# Patient Record
Sex: FEMALE | Race: WHITE | Employment: OTHER | URBAN - METROPOLITAN AREA
[De-identification: names, ages, dates, MRNs, and addresses within clinical notes are randomized per-mention and may not be internally consistent; named-entity substitution may affect disease eponyms.]

---

## 2017-01-09 ENCOUNTER — OFFICE VISIT (OUTPATIENT)
Dept: INTERNAL MEDICINE CLINIC | Age: 67
End: 2017-01-09

## 2017-01-09 VITALS
BODY MASS INDEX: 26.99 KG/M2 | RESPIRATION RATE: 24 BRPM | WEIGHT: 162 LBS | SYSTOLIC BLOOD PRESSURE: 138 MMHG | OXYGEN SATURATION: 98 % | HEIGHT: 65 IN | TEMPERATURE: 98 F | HEART RATE: 84 BPM | DIASTOLIC BLOOD PRESSURE: 80 MMHG

## 2017-01-09 DIAGNOSIS — R45.89 FIDGETING: ICD-10-CM

## 2017-01-09 DIAGNOSIS — R26.9 GAIT DIFFICULTY: ICD-10-CM

## 2017-01-09 DIAGNOSIS — I25.10 CORONARY ARTERY DISEASE INVOLVING NATIVE CORONARY ARTERY OF NATIVE HEART WITHOUT ANGINA PECTORIS: ICD-10-CM

## 2017-01-09 DIAGNOSIS — G47.9 SLEEP DISORDER, UNSPECIFIED: ICD-10-CM

## 2017-01-09 DIAGNOSIS — I10 ESSENTIAL HYPERTENSION: Chronic | ICD-10-CM

## 2017-01-09 DIAGNOSIS — M79.604 RIGHT LEG PAIN: Primary | Chronic | ICD-10-CM

## 2017-01-09 RX ORDER — DIAZEPAM 5 MG/1
TABLET ORAL
Qty: 60 TAB | Refills: 2 | Status: SHIPPED | OUTPATIENT
Start: 2017-01-09 | End: 2017-05-02 | Stop reason: SDUPTHER

## 2017-01-09 RX ORDER — AMLODIPINE BESYLATE 10 MG/1
10 TABLET ORAL DAILY
Qty: 90 TAB | Refills: 3 | Status: SHIPPED | OUTPATIENT
Start: 2017-01-09 | End: 2017-05-02 | Stop reason: SDUPTHER

## 2017-01-09 RX ORDER — HYDROCODONE BITARTRATE AND ACETAMINOPHEN 7.5; 325 MG/1; MG/1
1 TABLET ORAL
Qty: 90 TAB | Refills: 0 | Status: SHIPPED | OUTPATIENT
Start: 2017-01-09 | End: 2017-02-03 | Stop reason: SDUPTHER

## 2017-01-09 NOTE — PROGRESS NOTES
HISTORY OF PRESENT ILLNESS  Momo Mcknight is a 73 y.o. female. Pt. comes in with her  for f/u. Has multiple medical problems. has chronic R lower back/leg/buttock/groin/hip. Extensive w/u by different specialists has not shown any specific etiology. PT and home exercises id helping. Gait is off but improved with no recent falls. On norco and valium prn. Reports sleep issues at night. Feels fidgety. Has been on Cymbalta for a few months and wonders about side effects. Reports drinking coffee and alcohol in evening. Keep TV and light on at night. Also drinks coffee around 2 AM after waking up. Thinks it helps her sleep. Now on disability. Reports compliance with medications and diet. Med list and most recent labs/studies reviewed with pt. Has not been active physically. Has an appointment for neuropsychiatric evaluation coming up. Needs med refills. No other new c/o. Leg Pain    Pertinent negatives include no back pain. Hypertension    Associated symptoms include malaise/fatigue. Pertinent negatives include no chest pain, no blurred vision, no headaches, no dizziness and no shortness of breath. Sleep Problem   Pertinent negatives include no chest pain, no abdominal pain, no headaches and no shortness of breath. Review of Systems   Constitutional: Positive for malaise/fatigue. Negative for weight loss. Eyes: Negative for blurred vision. Respiratory: Negative for shortness of breath. Cardiovascular: Negative for chest pain and leg swelling. Gastrointestinal: Negative for abdominal pain. Genitourinary: Negative for dysuria. Musculoskeletal: Positive for joint pain (R lateral buttock/hip/leg). Negative for back pain and falls. Skin: Negative. Neurological: Positive for focal weakness (R leg). Negative for dizziness, sensory change and headaches. Psychiatric/Behavioral: Positive for depression. Negative for memory loss. The patient is nervous/anxious.  The patient does not have insomnia. All other systems reviewed and are negative. Physical Exam   Constitutional: She is oriented to person, place, and time. She appears well-developed and well-nourished. No distress (with pain). HENT:   Head: Normocephalic and atraumatic. Mouth/Throat: Oropharynx is clear and moist.   Eyes: Conjunctivae are normal. No scleral icterus. Neck: Normal range of motion. Neck supple. No JVD present. No thyromegaly present. Cardiovascular: Normal rate, regular rhythm and normal heart sounds. Pulmonary/Chest: Effort normal and breath sounds normal. No respiratory distress. She has no wheezes. She has no rales. Abdominal: Soft. Bowel sounds are normal. She exhibits no distension. Musculoskeletal: She exhibits tenderness (R upper ateral buttock/hip). She exhibits no edema. djd  Weak legs with muscle atrophy   Neurological: She is alert and oriented to person, place, and time. Coordination abnormal.   Skin: Skin is warm and dry. No rash noted. Psychiatric: Her behavior is normal.   Seems anxious and depressed   Nursing note and vitals reviewed. ASSESSMENT and PLAN  Magdaleno Barkley was seen today for leg pain, hypertension and sleep problem. Diagnoses and all orders for this visit:    Right leg pain, chronic    Sleep disorder, unspecified    Fidgeting    Essential hypertension    Gait difficulty    Coronary artery disease involving native coronary artery of native heart without angina pectoris    Other orders  -     diazePAM (VALIUM) 5 mg tablet; TAKE ONE TABLET BY MOUTH EVERY 12 HOURS AS NEEDED FOR ANXIETY  -     HYDROcodone-acetaminophen (NORCO) 7.5-325 mg per tablet; Take 1 Tab by mouth every eight (8) hours as needed for Pain. Max Daily Amount: 3 Tabs. -     amLODIPine (NORVASC) 10 mg tablet; Take 1 Tab by mouth daily. Follow-up Disposition:  Return in about 3 months (around 4/9/2017).   lab results and schedule of future lab studies reviewed with patient  reviewed diet, exercise and weight control  cardiovascular risk and specific lipid/LDL goals reviewed  reviewed medications and side effects in detail  use of aspirin to prevent MI and TIA's discussed  Pt declined flu shot  F/u with other MD's as scheduled  Discussed sleep hygiene in detail. Written info given.

## 2017-01-09 NOTE — MR AVS SNAPSHOT
Visit Information Date & Time Provider Department Dept. Phone Encounter #  
 1/9/2017  8:45 AM Yuniel Gilln [facility] Internal Medicine [phone] [id number] Follow-up Instructions Return in about 3 months (around 4/9/2017). Your Appointments 1/11/2017 10:40 AM  
New Patient with Prosper Mike, Gabriela Neurology [facility] [place] [address]) Appt Note: new patient consult/ Wayne Johansen [address] [place] [address] [facility] [id number] [phone]  
  
   
 [address] [place] [id number] [address] Upcoming Health Maintenance Date Due Hepatitis C Screening 1950 DTaP/Tdap/Td series (1 - Tdap) 2/24/1972 FOBT Q 1 YEAR AGE 50-75 2/24/2001 ZOSTER VACCINE AGE 60> 2/24/2011 GLAUCOMA SCREENING Q2Y 2/24/2016 Pneumococcal 65+ Low/Medium Risk (1 of 2 - PCV13) 2/24/2016 INFLUENZA AGE 9 TO ADULT 8/1/2016 MEDICARE YEARLY EXAM 4/9/2017 BREAST CANCER SCRN MAMMOGRAM 4/8/2018 Allergies as of 1/9/2017  Review Complete On: 1/9/2017 By: Jose Roldan,  Severity Noted Reaction Type Reactions Pcn [Penicillins]  12/16/2009    Swelling Current Immunizations  Reviewed on 2/4/2015 No immunizations on file. Not reviewed this visit You Were Diagnosed With   
  
 Codes Comments Right leg pain    -  Primary ICD-10-CM: M79.604 ICD-9-CM: 729.5 Sleep disorder, unspecified     ICD-10-CM: G47.9 ICD-9-CM: 780.50 Essential hypertension     ICD-10-CM: I10 
ICD-9-CM: 401.9 Gait difficulty     ICD-10-CM: R26.9 ICD-9-CM: 369. 2 Coronary artery disease involving native coronary artery of native heart without angina pectoris     ICD-10-CM: I25.10 ICD-9-CM: 414.01 Vitals BP Pulse Temp Resp Height(growth percentile) Weight(growth percentile) 138/80 84 98 °F (36.7 °C) 24 5' 5\" (1.651 m) 162 lb (73.5 kg) SpO2 BMI OB Status Smoking Status 98% 26.96 kg/m2 Postmenopausal Former Smoker BMI and BSA Data Body Mass Index Body Surface Area  
 26.96 kg/m 2 1.84 m 2 Preferred Pharmacy Pharmacy Name Phone [facility] [address] [address] Your Updated Medication List  
  
   
This list is accurate as of: 1/9/17  9:46 AM.  Always use your most recent med list. amLODIPine 10 mg tablet Commonly known as:  Irvinelo Myersbury Take 1 Tab by mouth daily. aspirin 325 mg tablet Commonly known as:  ASPIRIN Take 325 mg by mouth daily. diazePAM 5 mg tablet Commonly known as:  VALIUM  
TAKE ONE TABLET BY MOUTH EVERY 12 HOURS AS NEEDED FOR ANXIETY DULoxetine 30 mg capsule Commonly known as:  CYMBALTA Take 1 Cap by mouth daily. ergocalciferol 50,000 unit capsule Commonly known as:  ERGOCALCIFEROL Take 1 Cap by mouth every seven (7) days. hydroCHLOROthiazide 25 mg tablet Commonly known as:  HYDRODIURIL Take 1 Tab by mouth daily. HYDROcodone-acetaminophen 7.5-325 mg per tablet Commonly known as:  Claire Fothergill Take 1 Tab by mouth every eight (8) hours as needed for Pain. Max Daily Amount: 3 Tabs. potassium 99 mg tablet Take 99 mg by mouth daily. VITAMIN D3 1,000 unit tablet Generic drug:  cholecalciferol Take 2,000 Units by mouth daily. Prescriptions Printed Refills  
 diazePAM (VALIUM) 5 mg tablet 2 Sig: TAKE ONE TABLET BY MOUTH EVERY 12 HOURS AS NEEDED FOR ANXIETY Class: Print HYDROcodone-acetaminophen (NORCO) 7.5-325 mg per tablet 0 Sig: Take 1 Tab by mouth every eight (8) hours as needed for Pain. Max Daily Amount: 3 Tabs. Class: Print Route: Oral  
  
Prescriptions Sent to Pharmacy Refills  
 amLODIPine (NORVASC) 10 mg tablet 3 Sig: Take 1 Tab by mouth daily. Class: Normal  
 Pharmacy: [facility] [address], Steven Leahy  #: [phone]  Route: Oral  
 Follow-up Instructions Return in about 3 months (around 4/9/2017). Patient Instructions Learning About Sleeping Well What does sleeping well mean? Sleeping well means getting enough sleep. How much sleep is enough varies among people. The number of hours you sleep is not as important as how you feel when you wake up. If you do not feel refreshed, you probably need more sleep. Another sign of not getting enough sleep is feeling tired during the day. The average total nightly sleep time is 7½ to 8 hours. Healthy adults may need a little more or a little less than this. Why is getting enough sleep important? Getting enough quality sleep is a basic part of good health. When your sleep suffers, your mood and your thoughts can suffer too. You may find yourself feeling more grumpy or stressed. Not getting enough sleep also can lead to serious problems, including injury, accidents, anxiety, and depression. What might cause poor sleeping? Many things can cause sleep problems, including: · Stress. Stress can be caused by fear about a single event, such as giving a speech. Or you may have ongoing stress, such as worry about work or school. · Depression, anxiety, and other mental or emotional conditions. · Changes in your sleep habits or surroundings. This includes changes that happen where you sleep, such as noise, light, or sleeping in a different bed. It also includes changes in your sleep pattern, such as having jet lag or working a late shift. · Health problems, such as pain, breathing problems, and restless legs syndrome. · Lack of regular exercise. How can you help yourself? Here are some tips that may help you sleep more soundly and wake up feeling more refreshed. Your sleeping area · Use your bedroom only for sleeping and sex. A bit of light reading may help you fall asleep. But if it doesn't, do your reading elsewhere in the house. Don't watch TV in bed. · Be sure your bed is big enough to stretch out comfortably, especially if you have a sleep partner. · Keep your bedroom quiet, dark, and cool. Use curtains, blinds, or a sleep mask to block out light. To block out noise, use earplugs, soothing music, or a \"white noise\" machine. Your evening and bedtime routine · Create a relaxing bedtime routine. You might want to take a warm shower or bath, listen to soothing music, or drink a cup of noncaffeinated tea. · Go to bed at the same time every night. And get up at the same time every morning, even if you feel tired. What to avoid · Limit caffeine (coffee, tea, caffeinated sodas) during the day, and don't have any for at least 4 to 6 hours before bedtime. · Don't drink alcohol before bedtime. Alcohol can cause you to wake up more often during the night. · Don't smoke or use tobacco, especially in the evening. Nicotine can keep you awake. · Don't take naps during the day, especially close to bedtime. · Don't lie in bed awake for too long. If you can't fall asleep, or if you wake up in the middle of the night and can't get back to sleep within 15 minutes or so, get out of bed and go to another room until you feel sleepy. · Don't take medicine right before bed that may keep you awake or make you feel hyper or energized. Your doctor can tell you if your medicine may do this and if you can take it earlier in the day. If you can't sleep · Imagine yourself in a peaceful, pleasant scene. Focus on the details and feelings of being in a place that is relaxing. · Get up and do a quiet or boring activity until you feel sleepy. · Don't drink any liquids after 6 p.m. if you wake up often because you have to go to the bathroom. Where can you learn more? Go to http://guanaco-alexander.info/. Enter J900 in the search box to learn more about \"Learning About Sleeping Well. \" Current as of: July 26, 2016 Content Version: 11.1 © 1451-9397 Healthwise, Incorporated. Care instructions adapted under license by [employer] (which disclaims liability or warranty for this information). If you have questions about a medical condition or this instruction, always ask your healthcare professional. [address] any warranty or liability for your use of this information. Introducing [facility]! Dear Chiara Reyna: 
Thank you for requesting a tastytrade account. Our records indicate that you already have an active tastytrade account. You can access your account anytime at [email or web address] Did you know that you can access your hospital and ER discharge instructions at any time in tastytrade? You can also review all of your test results from your hospital stay or ER visit. Additional Information If you have questions, please visit the Frequently Asked Questions section of the tastytrade website at [email or web address] Remember, tastytrade is NOT to be used for urgent needs. For medical emergencies, dial 911. Now available from your iPhone and Android! Please provide this summary of care documentation to your next provider. Your primary care clinician is listed as Alex Pitt. If you have any questions after today's visit, please call [phone].

## 2017-01-09 NOTE — PROGRESS NOTES
Chief Complaint   Patient presents with    Leg Pain     RLE pain level 8/10 and LLE 6/10    Hypertension     needs refill #90 day supply       1. Have you been to the ER, urgent care clinic since your last visit? Hospitalized since your last visit? [facility]     2. Have you seen or consulted any other health care providers outside of the [address] [place] [address] since your last visit? Include any pap smears or colon screening.   NO      Used 2 patient I. D. 's

## 2017-01-09 NOTE — PATIENT INSTRUCTIONS

## 2017-01-11 ENCOUNTER — OFFICE VISIT (OUTPATIENT)
Dept: NEUROLOGY | Age: 67
End: 2017-01-11

## 2017-01-11 DIAGNOSIS — R25.1 TREMOR: ICD-10-CM

## 2017-01-11 DIAGNOSIS — F43.23 ADJUSTMENT DISORDER WITH MIXED ANXIETY AND DEPRESSED MOOD: ICD-10-CM

## 2017-01-11 DIAGNOSIS — R41.3 MEMORY LOSS: Primary | ICD-10-CM

## 2017-01-11 DIAGNOSIS — M79.604 RIGHT LEG PAIN: ICD-10-CM

## 2017-01-11 DIAGNOSIS — R53.1 WEAKNESS GENERALIZED: ICD-10-CM

## 2017-01-11 NOTE — PROGRESS NOTES
[address] [facility]  [address] [facility]   [facility] [address] [facility] [address] [facility]   Krunal Smith   [phone] Office   [phone] Fax      Neuropsychology    Initial Diagnostic Interview Note      Referral:  DO Anisa Perez is a 73 y.o. right handed  female who was accompanied by her spouse to the initial clinical interview on 1/11/17 . Please refer to her medical records for details pertaining to her history. Briefly, the patient reported that she completed the 12th grade without history of previously diagnosed LD and/or receipt of special education services. She was working in a [employer] and is out on medical leave. The patient had an ED visit and neurospych testing was recommended as there are questions of psychogenic versus organic based neurocognitive and/or physical functioning. She has episodic weakness and giving out of the leg and she falls. Yesterday, she fell six times and she has hit her head many times. She fell into furniture yesterday and hit her head. She falls heavily, not a light fall. Right leg hurt. Tremors also. Onset of about now for about a year and no known reason for same. Has participated in therapies to limited avail. Some diminished speed of processing and short term memory issues also. Can't communicate properly to limbs. Loses words sometimes, but not major. No known neurologic correlate. She has no reported prior history of major mood problems, but has been in the house for over a year and is more depressed now and anxious about what is going on. No counseling or psychiatrist currently. See imaging and labs in chart. MMSE: 26/30 with recall 1/3 and spelling 3/5. Clock: Normal, but slow to complete, and tremor    TOPF: 46    No previous neuropsych testing,.      Historian: Good  Praxis: No UE apraxia  R/L Orientation: Intact to self and to other  Dress: within normal limits   Weight: within normal limits   Appearance/Hygiene: within normal limits   Gait: slow, with rollator   Assistive Devices: rollator, glasses  Mood: mildly depressed  Affect: tearful at times   Comprehension: within normal limits   Thought Process: logical and goal oriented but slow to process  Expressive Language: within normal limits   Receptive Language: within normal limits   Motor:  No cognitive or motor perseveration  ETOH: 1 -2 beers a day  Tobacco: Quit 10 years ago  Illicit: Denied  SI/HI: Denied  Psychosis: Denied   Insight: Within normal limits  Judgment: Within normal limits  Other Psych:      Past Medical History   Diagnosis Date    CAD (coronary artery disease)     Hypertension     Hypokalaemia        Past Surgical History   Procedure Laterality Date    Hx appendectomy      Hx oophorectomy Right     Hx tonsillectomy         Allergies   Allergen Reactions    Pcn [Penicillins] Swelling       Family History   Problem Relation Age of Onset    Diabetes Mother        Social History   Substance Use Topics    Smoking status: Former Smoker     Packs/day: 0.50     Quit date: 10/19/2011    Smokeless tobacco: Never Used    Alcohol use 0.6 - 1.2 oz/week     1 - 2 Standard drinks or equivalent per week      Comment: one drink per week       Current Outpatient Prescriptions   Medication Sig Dispense Refill    diazePAM (VALIUM) 5 mg tablet TAKE ONE TABLET BY MOUTH EVERY 12 HOURS AS NEEDED FOR ANXIETY 60 Tab 2    HYDROcodone-acetaminophen (NORCO) 7.5-325 mg per tablet Take 1 Tab by mouth every eight (8) hours as needed for Pain. Max Daily Amount: 3 Tabs. 90 Tab 0    amLODIPine (NORVASC) 10 mg tablet Take 1 Tab by mouth daily. 90 Tab 3    DULoxetine (CYMBALTA) 30 mg capsule Take 1 Cap by mouth daily. 30 Cap 5    hydrochlorothiazide (HYDRODIURIL) 25 mg tablet Take 1 Tab by mouth daily. 90 Tab 3    ergocalciferol (ERGOCALCIFEROL) 50,000 unit capsule Take 1 Cap by mouth every seven (7) days.  (Patient taking differently: Take 50,000 Units by mouth every Monday.) 12 Cap 0    cholecalciferol (VITAMIN D3) 1,000 unit tablet Take 2,000 Units by mouth daily.  potassium 99 mg tablet Take 99 mg by mouth daily.  aspirin (ASPIRIN) 325 mg tablet Take 325 mg by mouth daily. Plan:  Obtain authorization for testing from insurance company. Report to follow once testing, scoring, and interpretation completed. ? Organic based neurocognitive issues versus mood disorder or combination of same. ? Problems organic, functional, or both? This note will not be viewable in [address]

## 2017-01-12 ENCOUNTER — OFFICE VISIT (OUTPATIENT)
Dept: NEUROLOGY | Age: 67
End: 2017-01-12

## 2017-01-12 DIAGNOSIS — F33.1 DEPRESSION, MAJOR, RECURRENT, MODERATE (HCC): ICD-10-CM

## 2017-01-12 DIAGNOSIS — F44.9 CONVERSION DISORDER: ICD-10-CM

## 2017-01-12 DIAGNOSIS — F41.1 GENERALIZED ANXIETY DISORDER: ICD-10-CM

## 2017-01-12 DIAGNOSIS — R41.3 MEMORY LOSS: Primary | ICD-10-CM

## 2017-01-19 NOTE — PROGRESS NOTES
[address] [facility],5Th [address]  [address] [facility] [address] Northwest HospitalLynne huizar Al Michele Espinal   [phone] Office   [phone] Fax      Neuropsychological Evaluation Report      Referral:  Sisto Blizzard, DO Tyna Annas is a 73 y.o. right handed  female who was accompanied by her spouse to the initial clinical interview on 1/11/17 . Please refer to her medical records for details pertaining to her history. Briefly, the patient reported that she completed the 12th grade without history of previously diagnosed LD and/or receipt of special education services. She was working in a [employer] and is out on medical leave. The patient had an ED visit and neurospych testing was recommended as there are questions of psychogenic versus organic based neurocognitive and/or physical functioning. She has episodic weakness and giving out of the leg and she falls. Yesterday, she fell six times and she has hit her head many times. She fell into furniture yesterday and hit her head. She falls heavily, not a light fall. Right leg hurt. Tremors also. Onset of about now for about a year and no known reason for same. Has participated in therapies to limited avail. Some diminished speed of processing and short term memory issues also. Can't communicate properly to limbs. Loses words sometimes, but not major. No known neurologic correlate. She has no reported prior history of major mood problems, but has been in the house for over a year and is more depressed now and anxious about what is going on. No counseling or psychiatrist currently. See imaging and labs in chart. MMSE: 26/30 with recall 1/3 and spelling 3/5. Clock: Normal, but slow to complete, and tremor    TOPF: 46    No previous neuropsych testing,.      Historian: Good  Praxis: No UE apraxia  R/L Orientation: Intact to self and to other  Dress: within normal limits   Weight: within normal limits Appearance/Hygiene: within normal limits   Gait: slow, with rollator   Assistive Devices: rollator, glasses  Mood: mildly depressed  Affect: tearful at times   Comprehension: within normal limits   Thought Process: logical and goal oriented but slow to process  Expressive Language: within normal limits   Receptive Language: within normal limits   Motor:  No cognitive or motor perseveration  ETOH: 1 -2 beers a day  Tobacco: Quit 10 years ago  Illicit: Denied  SI/HI: Denied  Psychosis: Denied   Insight: Within normal limits  Judgment: Within normal limits  Other Psych:      Past Medical History   Diagnosis Date    CAD (coronary artery disease)     Hypertension     Hypokalaemia        Past Surgical History   Procedure Laterality Date    Hx appendectomy      Hx oophorectomy Right     Hx tonsillectomy         Allergies   Allergen Reactions    Pcn [Penicillins] Swelling       Family History   Problem Relation Age of Onset    Diabetes Mother        Social History   Substance Use Topics    Smoking status: Former Smoker     Packs/day: 0.50     Quit date: 10/19/2011    Smokeless tobacco: Never Used    Alcohol use 0.6 - 1.2 oz/week     1 - 2 Standard drinks or equivalent per week      Comment: one drink per week       Current Outpatient Prescriptions   Medication Sig Dispense Refill    diazePAM (VALIUM) 5 mg tablet TAKE ONE TABLET BY MOUTH EVERY 12 HOURS AS NEEDED FOR ANXIETY 60 Tab 2    HYDROcodone-acetaminophen (NORCO) 7.5-325 mg per tablet Take 1 Tab by mouth every eight (8) hours as needed for Pain. Max Daily Amount: 3 Tabs. 90 Tab 0    amLODIPine (NORVASC) 10 mg tablet Take 1 Tab by mouth daily. 90 Tab 3    DULoxetine (CYMBALTA) 30 mg capsule Take 1 Cap by mouth daily. 30 Cap 5    hydrochlorothiazide (HYDRODIURIL) 25 mg tablet Take 1 Tab by mouth daily. 90 Tab 3    ergocalciferol (ERGOCALCIFEROL) 50,000 unit capsule Take 1 Cap by mouth every seven (7) days.  (Patient taking differently: Take 50,000 Units by mouth every Monday.) 12 Cap 0    cholecalciferol (VITAMIN D3) 1,000 unit tablet Take 2,000 Units by mouth daily.  potassium 99 mg tablet Take 99 mg by mouth daily.  aspirin (ASPIRIN) 325 mg tablet Take 325 mg by mouth daily. Plan:  Obtain authorization for testing from insurance company. Report to follow once testing, scoring, and interpretation completed. ? Organic based neurocognitive issues versus mood disorder or combination of same. ? Problems organic, functional, or both? This note will not be viewable in [address] Neuropsychological Evaluation  Patient Testing 1/12/17 Report Completed 1/19/17  A Psychometrist Assisted w/ portions of this evaluation while under my direct supervision    Please refer to the patient's initial interview progress note (copied above) and her medical records for details pertaining to her history. Today's neuropsychological test scores follow: The following assessment procedures were performed:      Neuropsychologist Performed, Interpreted, & Reported: Neuropsychological Mental Status Exam, Revised Memory & Behavior Checklist, Mini Mental State Exam, Clock Drawing Test, Test Of Premorbid Functioning, Anders-Melzack Pain Questionnaire,  History Taking  & Clinical Interview With The Patient, Additional History Taking w/ The Patient's Spouse, Review Of Available Records. Psychometrist Administered & Neuropsychologist Interpreted & Neuropsychologist Reported: Finger Tapping Test, Grooved Pegboard Test,  Wechsler Adult Intelligence Scale - IV, Verbal Fluency Tests, [employer] - Revised, Trailmaking Test Parts A & B, [place] Verbal Learning Test - II, Maximus Complex Figure Test, Donohue Depression Inventory - II, Donohue Anxiety Inventory, Personality Assessment Inventory.      Computer Administered & Neuropsychologist Interpreted & Neuropsychologist Reported: Lesvia Continuous Performance Test - III      Test Findings:  Note:  The patients raw data have been compared with currently available norms which include demographic corrections for age, gender, and/or education. Sometimes, the patients scores are compared to demographically similar individuals as close to the patients age, education level, etc., as possible. \"Average\" is viewed as being +/- 1 standard deviation (SD) from the stated mean for a particular test score. \"Low average\" is viewed as being between 1 and 2 SD below the mean, and above average is viewed as being 1 and 2 SD above the mean. Scores falling in the borderline range (between 1-1/2 and 2 SD below the mean) are viewed with particular attention as to whether they are normal or abnormal neurocognitive test scores. Other methods of inference in analyzing the test data are also utilized, including the pattern and range of scores in the profile, bilateral motor functions, and the presence, if any, of pathognomonic signs. Behaviorally, the patient was friendly and cooperative and appeared motivated to perform well during this examination. She reported being in physical pain during this evaluation and a pillow was provided for her back pain. Her spouse encouraged her to continue and she was able to do so. She said she had trouble seeing and she was given reading glasses. Within this context, the results of this evaluation are viewed as a valid reflection of the patients actual neurocognitive and emotional status. Her structured word list fluency, as assessed by the FAS Test, was within the mildly impaired range with a T score of 35. Category fluency was within the moderately impaired range with a T score of 26. Confrontation naming ability, as assessed by the [facility] - Revised, was within the average range at 53/60 correct (T = 45).    This pattern of performance is indicative of a patient who is at increased risk for day-to-day problems with verbal fluency and confrontation naming ability was normal. The patient was administered the Lesvia Continuous Performance Test - III, a computer-administered test of sustained attention, and review of the subscales within this instrument revealed moderate concern for inattentiveness without impulsivity. This pattern of performance is  indicative of a patient who is at increased risk for day-to-day problems with sustained visual attention/concentration. The patient was administered the Wechsler Adult Intelligence Scale - IV. See Appendix I for full breakdown of IQ test scores (scanned into media section of this EMR). As can be seen, there was a clinically significant difference between her borderline range Working Memory Index score of 71 (3rd %ile) and her extremely low range Processing Speed Index score of 50 (<0.1st %ile). Her Verbal Comprehension Index score of 95 was within the average range. Her Perceptual Reasoning Index score of 71 (3rd %ile) was  borderline range. This pattern of performance is indicative of a patient who is at increased risk for day-to-day problems with working memory, processing speed, and perceptual reasoning. These scores are much lower than expected based on her performance on a task estimating premorbid functioning levels. The patient was administered the [employer] [facility] and generated a generally normal range and positive learning curve over five repeated auditory word list learning trials. An interference trial was within normal limits. Free and cued, short delayed recall were borderline. Free recall after a long delay was mildly impaired. Cued recall after a long delay was normal.  Recognition recall was normal.  Forced choice recall was normal.  This is often associated with functional interference. The patients performance on the copy portion of the Maximus Complex Figure Test was impaired (<1st %ile).   Recall for the complex design was within the impaired range after both short (<1st %ile) and long (<1st %ile) delays. Recognition recall was impaired. This pattern of performance is indicative of a patient who is at increased risk for day-to-day problems with visual organization and visual delayed memory. Simple timed visual motor sequencing (Trailmaking Test Part A) was within the moderately to severely impaired range with a T score of 21. Her performance on a similar, but more complex task of timed visual motor sequencing (Trailmaking Test Part B) was within the moderately to severely impaired range with a T score of 24. She made only one sequencing error on this latter test, and was able to complete the test within the time allotted. Taken together, this pattern of performance is not indicative of a patient who is at increased risk for day-to-day problems with executive functioning. Psychomotor slowing versus attention issues are noted, however. Motor speed for finger tapping was within the moderately to severely impaired range bilaterally. Fine motor dexterity, as assessed by the [facility], was within the moderately to severely impaired range bilaterally. This pattern of performance is  indicative of a patient who is at increased risk for day-to-day problems with bilateral motor speed and dexterity. The patient rated her current level of pain as \"5/5 - Excruciating\" on the Anders-Melzack Pain Questionnaire. She reported pain in her left shoulder and back, as well as right knee. Jhonny Carver Her Donohue Depression Inventory -II score of 13 was within the minimally depressed range. Her Donohue Anxiety Inventory score of 5 reflected minimal anxiety. The patient was also administered the Personality Assessment Inventory and a high level of patient response inconsistencies precluded this test from further interpretation. Impressions & Recommendations:   This patient generated an abnormal range Neuropsychological Evaluation with respect to neurocognitive functioning. In this regard, impairments are noted for verbal fluency, sustained attention, working memory, processing speed, perceptual reasoning, visual organization, visual memory, auditory short delayed recall, free recall after a long delay (auditory), and bilateral motor skills. At the same time, her confrontation naming, auditory cued recall, auditory recognition recall, executive functioning (though slow) and verbal comprehension remain normal.  Emotionally, the patient denied clinically significant psychopathology on two brief self-report questionnaires. Lengthier assessment of personality functioning (which could have assisted greatly in terms of determining potential somatization issues) could not be further interpreted due to a high level of patient response inconsistencies. She reports her current level of pain as severe. Severe pain (perceived and/or actual) at this level will interfere with day-to-day neurocognitive functioning. There is a strong functional quality to this performance, but I cannot rule out fully a dementia. What I can opine is, however, that if these scores accurately reflected her day-to-day neurocognitive difficulties, she would have limited capacity to speak or remember anything at all and would be living in an assisted living facility receiving 24/7 care. Whereas she does report memory problems, and that these problems are declining - she is certainly not so incapacitated that she currently requires such intensive supervision. There is no evidence of a focal or lateralized brain dysfunction, despite her report of left sided weakness on ED visits in the past.  Previous providers have raised concern about possible somatization versus conversion issues. The current neurocognitive profile is not so clear, especially because the personality test results were invalid.   Despite her report on two questionnaires that she has no major problems with depression and anxiety, the history she reported to me as well as the information contained within the medical records indicate concerns for both depression and anxiety. While this may not be the sole source for her pain, it is certainly a contributing factor. As such, she should consult with Neurology and Psychiatry and I specifically recommend psychiatric treatment for anxiety and depression along with active participation in psychotherapy. I also recommend consideration for an appropriate medication for attention if this is not medically contraindicated. As soon as psychiatric status improves, I would like to re-evaluate her memory to better clarify any potential underlying organic based memory concerns. I find her competent at this time. We now have baseline data on her. Follow up as soon as psychiatric status improves. Clinical correlation is strongly advised. I will discuss these findings with the patient when she follows up with me in the near future. A follow up Neuropsychological Evaluation is indicated on a prn basis, especially if there are any cognitive and/or emotional changes. DIAGNOSES:   The Referral Diagnosis Of  Cognitive Difficulties - IS DEFERRED      Conversion Disorder      Anxiety - Moderate      Major Depression - Moderate           The above information is based upon information currently available to me. If there is any additional information of which I am currently unaware, I would be more than happy to review it upon having it made available to me. Thank you for the opportunity to see this interesting individual.     Sincerely,       Issac Bejarano. Lawyer Perry, Theo Valdez, DO     2 units -[id number]-  Record review. Review of history provided by patient. Review of collaborative information. Testing by Clinician. Review of raw data. Scoring. Report writing of individual tests administered by Clinician.   Integration of individual tests administered by psychometrist (that were previously reported and billed under psychometry code below) with testing by clinician and review of records/history/collaborative information. Case Conceptualization, Report writing. Coordination Of Care. 5 units  -[id number] - Psychometrist test prep, administration, and scoring under clinician's direct supervision. Clinical interpretation of individual tests administered by psychometrist .  Clinician report of individual tests administered by psychometrist.    1 unit - 35905 - Computer testing and scoring and clinician's interpretation of computer-administered test(s)    \"Unit\" is defined by CPT/National Guidelines (31 - 60 minutes). Integral services including scoring of raw data, data interpretation, case conceptualization, report writing etcetera were initiated after the patient finished testing/raw data collected and was completed on the date the report was signed.

## 2017-02-03 ENCOUNTER — OFFICE VISIT (OUTPATIENT)
Dept: INTERNAL MEDICINE CLINIC | Age: 67
End: 2017-02-03

## 2017-02-03 ENCOUNTER — HOSPITAL ENCOUNTER (OUTPATIENT)
Dept: GENERAL RADIOLOGY | Age: 67
Discharge: HOME | End: 2017-02-03
Payer: COMMERCIAL

## 2017-02-03 VITALS
HEIGHT: 65 IN | SYSTOLIC BLOOD PRESSURE: 130 MMHG | BODY MASS INDEX: 26.66 KG/M2 | DIASTOLIC BLOOD PRESSURE: 80 MMHG | TEMPERATURE: 95.9 F | HEART RATE: 85 BPM | OXYGEN SATURATION: 95 % | RESPIRATION RATE: 18 BRPM | WEIGHT: 160 LBS
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
## 2017-02-03 DIAGNOSIS — S00.03XA SCALP HEMATOMA, INITIAL ENCOUNTER: ICD-10-CM

## 2017-02-03 DIAGNOSIS — G98.8 NEUROLOGICAL DISORDER: ICD-10-CM

## 2017-02-03 DIAGNOSIS — M25.461 KNEE EFFUSION, RIGHT: ICD-10-CM

## 2017-02-03 DIAGNOSIS — R26.9 GAIT DIFFICULTY: ICD-10-CM

## 2017-02-03 DIAGNOSIS — G31.9 CEREBRAL ATROPHY, MILD (HCC): ICD-10-CM

## 2017-02-03 PROCEDURE — 73562 X-RAY EXAM OF KNEE 3: CPT

## 2017-02-03 RX ORDER — DULOXETIN HYDROCHLORIDE 60 MG/1
60 CAPSULE, DELAYED RELEASE ORAL DAILY
Qty: 30 CAP | Refills: 5 | Status: SHIPPED | OUTPATIENT
Start: 2017-02-03 | End: 2017-05-02 | Stop reason: SDUPTHER

## 2017-02-03 RX ORDER — HYDROCHLOROTHIAZIDE 25 MG/1
25 TABLET ORAL DAILY
Qty: 90 TAB | Refills: 3 | Status: SHIPPED | OUTPATIENT
Start: 2017-02-03 | End: 2017-05-02 | Stop reason: SDUPTHER

## 2017-02-03 RX ORDER — HYDROCODONE BITARTRATE AND ACETAMINOPHEN 7.5; 325 MG/1; MG/1
1 TABLET ORAL
Qty: 90 TAB | Refills: 0 | Status: SHIPPED | OUTPATIENT
Start: 2017-02-03 | End: 2017-03-13 | Stop reason: SDUPTHER

## 2017-02-03 NOTE — PROGRESS NOTES
Reviewed record in preparation for visit and have obtained necessary documentation. Identified pt with two pt identifiers(name and ). Health Maintenance Due   Topic    Hepatitis C Screening     DTaP/Tdap/Td series (1 - Tdap)    FOBT Q 1 YEAR AGE 54-65     ZOSTER VACCINE AGE 60>     GLAUCOMA SCREENING Q2Y     Pneumococcal 65+ Low/Medium Risk (1 of 2 - PCV13)    INFLUENZA AGE 9 TO ADULT          Chief Complaint   Patient presents with    Follow Up Chronic Condition    Pain (Chronic)    Hypertension          Learning Assessment:  :     Learning Assessment 2013   PRIMARY LEARNER Patient Patient Patient   HIGHEST LEVEL OF EDUCATION - PRIMARY LEARNER  - GRADUATED HIGH SCHOOL OR GED GRADUATED HIGH SCHOOL OR GED   BARRIERS PRIMARY LEARNER - NONE NONE   CO-LEARNER CAREGIVER - No -   PRIMARY LANGUAGE ENGLISH ENGLISH ENGLISH   LEARNER PREFERENCE PRIMARY LISTENING LISTENING LISTENING   LEARNING SPECIAL TOPICS - - no   ANSWERED BY patient patient patient   RELATIONSHIP SELF SELF SELF       Depression Screening:  :     PHQ 2 / 9, over the last two weeks 2015   Little interest or pleasure in doing things Several days   Feeling down, depressed or hopeless Several days   Total Score PHQ 2 2       Fall Risk Assessment:  :     Fall Risk Assessment, last 12 mths 2017   Able to walk? Yes   Fall in past 12 months? Yes   Fall with injury? No   Number of falls in past 12 months 8 or more   Fall Risk Score 8       Abuse Screening:  :     Abuse Screening Questionnaire 2014   Do you ever feel afraid of your partner? N N N N   Are you in a relationship with someone who physically or mentally threatens you? N N N N   Is it safe for you to go home?  Y Y Y Y       Coordination of Care Questionnaire:  :     1) Have you been to an emergency room, urgent care clinic since your last visit? no   Hospitalized since your last visit? no             2) Have you seen or consulted any other health care providers outside of [address] [place] [address] since your last visit? no  (Include any pap smears or colon screenings in this section.)    3) Do you have an Advance Directive on file? no    4) Are you interested in receiving information on Advance Directives? YES      Patient is accompanied by patient I have received verbal consent from Rebekah Alexander to discuss any/all medical information while they are present in the room.

## 2017-02-03 NOTE — MR AVS SNAPSHOT
Visit Information Date & Time Provider Department Dept. Phone Encounter #  
 2/3/2017  8:30 AM Jonah Badillo [facility] [phone] [id number] Follow-up Instructions Return in about 3 months (around 5/3/2017). Your Appointments 5/8/2017  8:30 AM  
ROUTINE CARE with Jonah Badillo DO [facility] ([address]) Appt Note: 4 mo [address] [facility] [address] [facility] [address]  
[phone]  
  
   
 [address] [facility] [address] Upcoming Health Maintenance Date Due Hepatitis C Screening 1950 DTaP/Tdap/Td series (1 - Tdap) 2/24/1972 FOBT Q 1 YEAR AGE 50-75 2/24/2001 ZOSTER VACCINE AGE 60> 2/24/2011 GLAUCOMA SCREENING Q2Y 2/24/2016 Pneumococcal 65+ Low/Medium Risk (1 of 2 - PCV13) 2/24/2016 INFLUENZA AGE 9 TO ADULT 8/1/2016 MEDICARE YEARLY EXAM 4/9/2017 BREAST CANCER SCRN MAMMOGRAM 4/8/2018 Allergies as of 2/3/2017  Review Complete On: 2/3/2017 By: Jonah Badillo DO Severity Noted Reaction Type Reactions Pcn [Penicillins]  12/16/2009    Swelling Current Immunizations  Reviewed on 2/4/2015 No immunizations on file. Not reviewed this visit You Were Diagnosed With   
  
 Codes Comments Fall, initial encounter    -  Primary ICD-10-CM: W19. Екатерина Lord ICD-9-CM: E888.9 Knee effusion, right     ICD-10-CM: M25.461 ICD-9-CM: 719.06 Scalp hematoma, initial encounter     ICD-10-CM: S00. [address] ICD-9-CM: 478 Gait difficulty     ICD-10-CM: R26.9 ICD-9-CM: 781.2 Neurological disorder     ICD-10-CM: G98.8 ICD-9-CM: 349.9 Cerebral atrophy, mild     ICD-10-CM: G31.9 ICD-9-CM: 331.9 Vitals BP Pulse Temp Resp Height(growth percentile) Weight(growth percentile) 130/80 (BP 1 Location: Right arm, BP Patient Position: Sitting) 85 95.9 °F (35.5 °C) (Oral) 18 5' 5\" (1.651 m) 160 lb (72.6 kg) SpO2 BMI OB Status Smoking Status 95% 26.63 kg/m2 Postmenopausal Former Smoker Vitals History BMI and BSA Data Body Mass Index Body Surface Area  
 26.63 kg/m 2 1.82 m 2 Preferred Pharmacy Pharmacy Name [facility] [address] [address] Your Updated Medication List  
  
   
This list is accurate as of: 2/3/17  9:52 AM.  Always use your most recent med list. amLODIPine 10 mg tablet Commonly known as:  Michelle Spruce Take 1 Tab by mouth daily. aspirin 325 mg tablet Commonly known as:  ASPIRIN Take 325 mg by mouth daily. diazePAM 5 mg tablet Commonly known as:  VALIUM  
TAKE ONE TABLET BY MOUTH EVERY 12 HOURS AS NEEDED FOR ANXIETY DULoxetine 60 mg capsule Commonly known as:  CYMBALTA Take 1 Cap by mouth daily. ergocalciferol 50,000 unit capsule Commonly known as:  ERGOCALCIFEROL Take 1 Cap by mouth every seven (7) days. hydroCHLOROthiazide 25 mg tablet Commonly known as:  HYDRODIURIL Take 1 Tab by mouth daily. HYDROcodone-acetaminophen 7.5-325 mg per tablet Commonly known as:  Steiner Minor Take 1 Tab by mouth every eight (8) hours as needed for Pain. Max Daily Amount: 3 Tabs. potassium 99 mg tablet Take 99 mg by mouth daily. VITAMIN D3 1,000 unit tablet Generic drug:  cholecalciferol Take 2,000 Units by mouth daily. Prescriptions Printed Refills HYDROcodone-acetaminophen (NORCO) 7.5-325 mg per tablet 0 Sig: Take 1 Tab by mouth every eight (8) hours as needed for Pain. Max Daily Amount: 3 Tabs. Class: Print Route: Oral  
  
Prescriptions Sent to Pharmacy Refills DULoxetine (CYMBALTA) 60 mg capsule 5 Sig: Take 1 Cap by mouth daily. Class: Normal  
 Pharmacy: [address], [facility] [address] Ph #: [phone] Route: Oral  
 hydroCHLOROthiazide (HYDRODIURIL) 25 mg tablet 3 Sig: Take 1 Tab by mouth daily. Class: Normal  
 Pharmacy: [facility] [address], [address]  #: [phone] Route: Oral  
  
Follow-up Instructions Return in about 3 months (around 5/3/2017). To-Do List   
 02/03/2017 Imaging:  XR KNEE RT MAX 2 VWS Introducing [facility] SERVICES! Dear Norbert Mario: 
Thank you for requesting a [facility] account. Our records indicate that you already have an active [facility] account. You can access your account anytime at [email or web address] Did you know that you can access your hospital and ER discharge instructions at any time in [facility] [employer] [facility]? You can also review all of your test results from your hospital stay or ER visit. Additional Information If you have questions, please visit the Frequently Asked Questions section of the [facility] website at [email or web address] Remember, [facility] is NOT to be used for urgent needs. For medical emergencies, dial 911. Now available from your iPhone and Android! Please provide this summary of care documentation to your next provider. Your primary care clinician is listed as Denny Zazueta. If you have any questions after today's visit, please call [phone].

## 2017-02-27 NOTE — PROGRESS NOTES
HISTORY OF PRESENT ILLNESS  Suzy Guo is a 78 y.o. female. Pt. comes in with her  for f/u. Has multiple medical problems. Reports recent fall at home and hitting her R knee and head. Gait is off. Has a chronic neurological d/o with chronic pain in R lower back/leg/buttock/groin/hip. Extensive w/u by different specialists has not shown any specific etiology. PT and home exercises helped little. Cortisone injection did not help much either. Lortab helps. On disability. Reports compliance with medications and diet. Med list and most recent labs/studies reviewed with pt. Not very active physically. Feels chronically depressed. Has not seen neurologist in a while. Needs med refills. No other new c/o. Follow Up Chronic Condition   Pertinent negatives include no chest pain, no abdominal pain, no headaches and no shortness of breath. Hypertension    Associated symptoms include malaise/fatigue. Pertinent negatives include no chest pain, no blurred vision, no headaches, no dizziness and no shortness of breath. Fall   Pertinent negatives include no abdominal pain and no headaches. Knee Pain   Pertinent negatives include no chest pain, no abdominal pain, no headaches and no shortness of breath. Review of Systems   Constitutional: Positive for malaise/fatigue. Negative for weight loss. Eyes: Negative for blurred vision. Respiratory: Negative for shortness of breath. Cardiovascular: Negative for chest pain and leg swelling. Gastrointestinal: Negative for abdominal pain. Genitourinary: Negative for dysuria. Musculoskeletal: Positive for falls and joint pain. Negative for back pain. Skin: Negative. Neurological: Positive for focal weakness (R leg). Negative for dizziness, sensory change and headaches. Psychiatric/Behavioral: Positive for depression. The patient is nervous/anxious. The patient does not have insomnia. All other systems reviewed and are negative.       Physical Exam   Constitutional: She is oriented to person, place, and time. She appears well-developed and well-nourished. No distress (with pain). Seems depressed  Using a cane   HENT:   Head: Normocephalic and atraumatic. Mouth/Throat: Oropharynx is clear and moist.   Small hematoma in back of head, tender, no bleeding   Eyes: Conjunctivae are normal. No scleral icterus. Neck: Normal range of motion. Neck supple. No JVD present. No thyromegaly present. Cardiovascular: Normal rate, regular rhythm and normal heart sounds. Pulmonary/Chest: Effort normal and breath sounds normal. No respiratory distress. She has no wheezes. She has no rales. Abdominal: Soft. Bowel sounds are normal. She exhibits no distension. Musculoskeletal: She exhibits tenderness (R upper ateral buttock/hip, R knee w/ effusion). She exhibits no edema. djd  Weak legs with muscle atrophy   Neurological: She is alert and oriented to person, place, and time. Coordination abnormal.   Skin: Skin is warm and dry. No rash noted. Psychiatric: Her behavior is normal.   Seems anxious and depressed   Nursing note and vitals reviewed. ASSESSMENT and PLAN  Concepcion Fine was seen today for follow up chronic condition, pain (chronic), hypertension, fall and knee pain. Diagnoses and all orders for this visit:    Fall, initial encounter  -     Cancel: XR KNEE RT MAX 2 VWS; Future    Knee effusion, right  -     Cancel: XR KNEE RT MAX 2 VWS; Future    Scalp hematoma, initial encounter    Gait difficulty    Neurological disorder    Cerebral atrophy, mild    Other orders  -     HYDROcodone-acetaminophen (NORCO) 7.5-325 mg per tablet; Take 1 Tab by mouth every eight (8) hours as needed for Pain. Max Daily Amount: 3 Tabs. -     DULoxetine (CYMBALTA) 60 mg capsule; Take 1 Cap by mouth daily. -     hydroCHLOROthiazide (HYDRODIURIL) 25 mg tablet; Take 1 Tab by mouth daily. Follow-up Disposition:  Return in about 3 months (around 5/3/2017).    lab results and schedule of future lab studies reviewed with patient  reviewed diet, exercise and weight control  reviewed medications and side effects in detail  Apply ice to area  F/u with other MD's as scheduled

## 2017-03-13 RX ORDER — HYDROCODONE BITARTRATE AND ACETAMINOPHEN 7.5; 325 MG/1; MG/1
1 TABLET ORAL
Qty: 90 TAB | Refills: 0 | Status: SHIPPED | OUTPATIENT
Start: 2017-03-13 | End: 2017-04-22

## 2017-04-04 ENCOUNTER — OFFICE VISIT (OUTPATIENT)
Dept: INTERNAL MEDICINE CLINIC | Age: 67
End: 2017-04-04

## 2017-04-04 ENCOUNTER — PATIENT OUTREACH (OUTPATIENT)
Dept: INTERNAL MEDICINE CLINIC | Age: 67
End: 2017-04-04

## 2017-04-04 VITALS
RESPIRATION RATE: 17 BRPM | DIASTOLIC BLOOD PRESSURE: 85 MMHG | SYSTOLIC BLOOD PRESSURE: 117 MMHG | TEMPERATURE: 96.6 F | HEART RATE: 105 BPM

## 2017-04-04 DIAGNOSIS — M79.604 CHRONIC PAIN OF RIGHT LOWER EXTREMITY: ICD-10-CM

## 2017-04-04 DIAGNOSIS — R26.9 GAIT DIFFICULTY: ICD-10-CM

## 2017-04-04 DIAGNOSIS — E78.00 HYPERCHOLESTEREMIA: ICD-10-CM

## 2017-04-04 DIAGNOSIS — I10 ESSENTIAL HYPERTENSION: Chronic | ICD-10-CM

## 2017-04-04 DIAGNOSIS — E55.9 VITAMIN D DEFICIENCY: ICD-10-CM

## 2017-04-04 DIAGNOSIS — R29.6 FREQUENT FALLS: ICD-10-CM

## 2017-04-04 DIAGNOSIS — G89.29 CHRONIC PAIN OF RIGHT LOWER EXTREMITY: ICD-10-CM

## 2017-04-04 DIAGNOSIS — G98.8 NEUROLOGICAL DISORDER: Primary | ICD-10-CM

## 2017-04-04 NOTE — MR AVS SNAPSHOT
Visit Information Date & Time Provider Department Dept. Phone Encounter #  
 4/4/2017  8:30 AM Brigida MoranMalik [facility] Internal Medicine [phone] [id number] Your Appointments 5/8/2017  8:30 AM  
ROUTINE CARE with Brigida Moran DO [facility] Internal Medicine ([facility]) Appt Note: 4 mo [address] [facility] [address]  
[phone]  
  
   
 [address] [facility] Upcoming Health Maintenance Date Due Hepatitis C Screening 1950 DTaP/Tdap/Td series (1 - Tdap) 2/24/1972 FOBT Q 1 YEAR AGE 50-75 2/24/2001 ZOSTER VACCINE AGE 60> 2/24/2011 GLAUCOMA SCREENING Q2Y 2/24/2016 Pneumococcal 65+ Low/Medium Risk (1 of 2 - PCV13) 2/24/2016 INFLUENZA AGE 9 TO ADULT 8/1/2016 MEDICARE YEARLY EXAM 4/9/2017 BREAST CANCER SCRN MAMMOGRAM 4/8/2018 Allergies as of 4/4/2017  Review Complete On: 4/4/2017 By: Brigida Moran DO Severity Noted Reaction Type Reactions Pcn [Penicillins]  12/16/2009    Swelling Current Immunizations  Reviewed on 2/4/2015 No immunizations on file. Not reviewed this visit You Were Diagnosed With   
  
 Codes Comments Neurological disorder    -  Primary ICD-10-CM: G98.8 ICD-9-CM: 349.9 Gait difficulty     ICD-10-CM: R26.9 ICD-9-CM: 781.2 Essential hypertension     ICD-10-CM: I10 
ICD-9-CM: 401.9 Chronic pain of right lower extremity     ICD-10-CM: M79.604, G89.29 ICD-9-CM: 729.5, 338.29 Frequent falls     ICD-10-CM: R29.6 ICD-9-CM: V15.88 Hypercholesteremia     ICD-10-CM: E78.00 ICD-9-CM: 272.0 Vitamin D deficiency     ICD-10-CM: E55.9 ICD-9-CM: 268.9 Vitals BP Pulse Temp Resp OB Status Smoking Status 117/85 (BP 1 Location: Right arm, BP Patient Position: Sitting) (!) 105 96.6 °F (35.9 °C) (Oral) 17 Postmenopausal Former Smoker Vitals History Preferred Pharmacy Pharmacy Name Phone [facility] [address] [facility] [facility] [address] Your Updated Medication List  
  
   
This list is accurate as of: 4/4/17  9:45 AM.  Always use your most recent med list. amLODIPine 10 mg tablet Commonly known as:  Chel Dural Take 1 Tab by mouth daily. aspirin 325 mg tablet Commonly known as:  ASPIRIN Take 325 mg by mouth daily. diazePAM 5 mg tablet Commonly known as:  VALIUM  
TAKE ONE TABLET BY MOUTH EVERY 12 HOURS AS NEEDED FOR ANXIETY DULoxetine 60 mg capsule Commonly known as:  CYMBALTA Take 1 Cap by mouth daily. ergocalciferol 50,000 unit capsule Commonly known as:  ERGOCALCIFEROL Take 1 Cap by mouth every seven (7) days. hydroCHLOROthiazide 25 mg tablet Commonly known as:  HYDRODIURIL Take 1 Tab by mouth daily. HYDROcodone-acetaminophen 7.5-325 mg per tablet Commonly known as:  Sharif Maplewood Take 1 Tab by mouth every eight (8) hours as needed for Pain. Max Daily Amount: 3 Tabs. potassium 99 mg tablet Take 99 mg by mouth daily. VITAMIN D3 1,000 unit tablet Generic drug:  cholecalciferol Take 2,000 Units by mouth daily. We Performed the Following MISAEL QL, W/REFLEX CASCADE [[id number] Custom] CBC W/O DIFF [32450 CPT(R)] CK [id number] CPT(R)] CRP, HIGH SENSITIVITY [77883 CPT(R)] LIPID PANEL [78926 CPT(R)] METABOLIC PANEL, COMPREHENSIVE [54159 CPT(R)] TSH 3RD GENERATION [77468 CPT(R)] VITAMIN D, 25 HYDROXY [id number] CPT(R)] Introducing [facility]! Dear Carlos Baez: 
Thank you for requesting a Pl[employer] account. Our records indicate that you already have an active [employer]ell account. You can access your account anytime at [email or web address] Did you know that you can access your hospital and ER discharge instructions at any time in Plasticell? You can also review all of your test results from your hospital stay or ER visit. Additional Information If you have questions, please visit the Frequently Asked Questions section of the [email or web address] website at [email or web address] Remember, [email or web address] is NOT to be used for urgent needs. For medical emergencies, dial 911. Now available from your iPhone and Android! Please provide this summary of care documentation to your next provider. Your primary care clinician is listed as Magdiel Brand. If you have any questions after today's visit, please call [phone].

## 2017-04-04 NOTE — PROGRESS NOTES
Annabelle Gaines is a 78 y.o. female   This patient was received as a referral from provider referral   Summary of patients top three medical problems:     Problem 1: Falls, Gait decline     Problem 2: Depression and anxiety     Problem 3: Med compliance 2/2 memory decline    Patient's challenges to self management identified: financial, inconsistent PCP relationship, medication management, stages of grief, transportation and utilization of services  Patients motivational level on a scale of 0-10: 5  Medication Management:  good adherence, poor understanding and experiencing side effects  [employer], Referrals, and Durable Medical Equipment: Medicare, secondary insurance company, [employer] care manager ( received this company as a contact yesterday [phone])    Follow up appointments:  June 9, 2017 @ 10:45  PCP requested assistance in room with patient. Pt and  trying to determine how to get aide in home for several hours per day since  works 6 hour days 6 days per week. Pt with minimum of 16 falls in the last month. Brandon Pina x2 yesterday. Pt notes she has at one time fallen between items of furniture for nearly 7 hours. States able to stand and walk at times, but at other times legs will just give out completely beneath her. Pt has bruises in different stages of healing all over body.  and wife often are both speaking at same times to [address]. Sometimes about same subject and other times about different things. There is a high level of frustration noted and verbalized on both sides. , Martha Wheatley, is highly concerned with pt being alone while he is at work.  He has had to come home during his work day to help patient when she has fallen as well as taking several days off to assist.  Martha Wheatley voices that he feels pt is not always trying her hardest and that he is upset that she is holding back when she knows he must work for them to pay bills and have insurance. Pt voices that she is trying her hardest and some days or times are better than others and he doesn't understand that she is truly doing her best. Informed Kevin Parisi that if the pt has a neurological disorder, it is like a phone call between the head and the legs/feet. That is a long distance for a message to travel back and forth within the body. Also, if compared to a cell phone, it's like having a conversation and your signal gets muddled or drops and then picks back up. The information going from one end of the conversation sometimes misses pieces or doesn't go through at all, but then may pick back up. The patient may not be trying her hardest and that would be a depression or psychological thing, but for the sake of reducing frustration between the two of them, to please consider that this could be what is going on. Pt at first denied that she has depression, but later throughout the conversation does vocalize both depression and anxiety multiple times. Kevin Parisi also notes that he attempts to redirect patient when walking so she will focus on the process of walking. States pt is easily distracted and worries this may contribute to falls. Pt then voices frustration with being redirected because she is 80years old and can take care of herself. Magdalena Juan asks has patient always been a very independent person. Pt states yes, she always has been able to take care of her own needs up to the last two years. Verbalizes that  cooks, cleans the house, laundry, etc. States she is very appreciative, but he does not believe this. Writer notes that pt's body has made other plans and at this time, pt does require assistance and that as a couple, being clear and concise with communication will . Advised pt and  to attempt some coping and communication mechanisms to decrease both of their frustration and stress levels.  Explained to both that when tensions are high, the way we speak to give information may not come out the way we intended and the way information is received is not always the way it was given. This can also be effected by the pt's admitted issue with depression and anxiety. Patient repeatedly notes that she used to be the life of the party and now has been stuck in their home for nearly 2 years. She voices guilt over not being able to tell when she will fall and becoming more of a burden on the .  notes that he loves his wife with all his heart and only wishes to help her.  voices concern over patient taking medications daily as prescribed. This is of concern especially since pt is on Cymbalta for her depression. Expressed importance of daily use of this medication for best result. Pt states she does not like the monthly planner he has as it is too big. Writer points out that a monthly planner may be too much for her, also pointing out pt notes not being able to always know the date unless going through the calendar. Both are willing to compromise and together will put daily scheduled meds in a \"weekly\" container. PRN meds will remain in bottles as they have been in the same place as they have been. This will help relieve some of this tension. Plan is to attempt this for 1 month and determine if this is a working plan or needs alteration. Husbands additional concern is pt's weakness has increased since PT was stopped being paid for by insurance. States pt's legs have gotten worse since that time. States pt will say her legs hurt or that she does not want to participate in the exercises with . Pt states the time is too early for her and that he acts like a professor. Writer offers again a compromise and restarting the exercises set forth by the PT. Explained that  will be working with the plan that a professional set forth and that pt had agreed was working for her at that time. Pt is agreeable to this.  Writer able to find agreeable days and times for pt and  to work on her strength with the previously determined PT plan. Again, they will attempt this for 1 month to determine if there is continued benefit. Basic explanations of the medical processes and coping mechanisms discussed with both patient and . Discussed mutually agreeable communication changes to reduce stress and anxiety. Weekly pill box given to patient and . Card with writer's name and phone number given. Viola Dakins to reach out to a home aide agency he was given last night to see if they offer needed services covered by pt's insurance plan. Writer pointed out they may have a SW there that can help with filling out paperwork for insurance and/or Medicare and disability. Debbie Swift to reach out to pt's secondary insurance to determine possible aide coverage as well if above noted agency does not have a SW. They have no family or nearby friends that can be of assistance if pt falls while  is at work. Neighbors that they know are in poor health and also have falling issues and are unable to be of assistance. Suggested possible medic alert.  states pt has phone on her at all times. Writer points out that this again would mean needing to leave work and also a delay. Viola Dakins will call writer with what may or may not be covered and writer will determine further plan and follow up at that time. Goals      Develops appropriate coping skills. R/T pt's mobility disability  R/T pt's depression, stress, and anxiety *[facility] 4/4/17       Initiate and reinforce education of the patient/family about management of disease and lifestyle changes. For  r/t pt's neurological/psychological impairments  R/T medication compliance and F/U appointments (specialists)  R/T clear communication skills between pt and /caregiver *[facility] 4/4/17       Supportive resources in place to maintain patient in the community (ie.  Home Health, DME equipment, refer to, medication assistant plan, etc.)            Attempting to get aide for up to 6 hours per day for 6 days per week  Pt has Georgette Madera, Gait belt *[facility] 4/4/17          Patient and  verbalized understanding of all information discussed. Patient has this Nurse Navigators contact information for any further questions, concerns, or needs. ** Navigator has reviewed in depth pt's prior PCP OVs, Neuropsychology OV and recommendations [facility]), Rheumatology OV Shmuel Wallace), Neurology OVs [facility]), and associated testing (labs, Xrays, CT, MRI, neuropsych examination) over the past 2 years which is pt's reported onset of symptoms.

## 2017-04-04 NOTE — PROGRESS NOTES
HISTORY OF PRESENT ILLNESS  Royal Chua is a 78 y.o. female. Pt. comes in with her  for f/u. Has multiple medical problems. Reports continued frequent simone at home. Hit her face recently and got bruised. Didn't got to ER. Gait is unsteady and getting worse. Has a chronic neurological d/o with chronic pain in R lower back/leg/buttock/groin/hip. Extensive w/u by different specialists has not shown any specific etiology. Brain MRI showed atrophy and microvascular changes. PT and home exercises helped little. Cortisone injection did not help much either. Lortab helps. Memory is poor and declining. Energy is low. Appetite is fair. Feels chronically depressed. Has not seen neurologist in a while. On disability.  is primary care giver and he is overwhelmed. Reports compliance with medications and diet. Med list and most recent labs/studies reviewed with pt. Not very active physically. Needs med refills and repeat labs. . No other new c/o. Fall   Pertinent negatives include no abdominal pain and no headaches. Extremity Weakness   Primary symptoms include focal weakness (R leg) and memory loss. Pertinent negatives include no shortness of breath, no chest pain and no headaches. Follow Up Chronic Condition   Pertinent negatives include no chest pain, no abdominal pain, no headaches and no shortness of breath. Knee Swelling    Pertinent negatives include no back pain. Knee Pain   Pertinent negatives include no chest pain, no abdominal pain, no headaches and no shortness of breath. Review of Systems   Constitutional: Positive for malaise/fatigue. Negative for weight loss. Eyes: Negative for blurred vision. Respiratory: Negative for shortness of breath. Cardiovascular: Negative for chest pain and leg swelling. Gastrointestinal: Negative for abdominal pain. Genitourinary: Negative for dysuria and frequency. Musculoskeletal: Positive for falls and joint pain.  Negative for back pain.   Skin: Negative. Neurological: Positive for focal weakness (R leg) and weakness. Negative for dizziness, sensory change and headaches. Psychiatric/Behavioral: Positive for depression and memory loss. Negative for hallucinations, substance abuse and suicidal ideas. The patient is nervous/anxious. The patient does not have insomnia. All other systems reviewed and are negative. Physical Exam   Constitutional: She is oriented to person, place, and time. She appears well-developed and well-nourished. No distress (with pain). Seems depressed  Using a cane   HENT:   Head: Normocephalic and atraumatic. Mouth/Throat: Oropharynx is clear and moist.   Eyes: Conjunctivae are normal. No scleral icterus. Neck: Normal range of motion. Neck supple. No JVD present. No thyromegaly present. Cardiovascular: Normal rate, regular rhythm and normal heart sounds. Pulmonary/Chest: Effort normal and breath sounds normal. No respiratory distress. She has no wheezes. She has no rales. Abdominal: Soft. Bowel sounds are normal. She exhibits no distension. Musculoskeletal: She exhibits tenderness (R hip/leg, chronic). She exhibits no edema. djd  Weak legs with muscle atrophy   Neurological: She is alert and oriented to person, place, and time. No cranial nerve deficit. Coordination abnormal.   Skin: Skin is warm and dry. No rash noted. Psychiatric: Her behavior is normal.   Seems anxious and depressed  frustrated   Nursing note and vitals reviewed. ASSESSMENT and PLAN  Rohan Orozco was seen today for fall, extremity weakness, follow up chronic condition, pain (chronic), knee swelling and knee pain.     Diagnoses and all orders for this visit:    Neurological disorder  -     CBC W/O DIFF  -     METABOLIC PANEL, COMPREHENSIVE  -     TSH 3RD GENERATION  -     MISAEL QL, W/REFLEX CASCADE  -     CRP, HIGH SENSITIVITY  -     CK    Gait difficulty    Essential hypertension    Chronic pain of right lower extremity    Frequent falls    Hypercholesteremia  -     LIPID PANEL    Vitamin D deficiency  -     VITAMIN D, 25 HYDROXY      Follow-up Disposition:  Return in about 3 months (around 7/4/2017). lab results and schedule of future lab studies reviewed with patient  reviewed diet, exercise and weight control  reviewed medications and side effects in detail  F/u with other MD's as scheduled  Explained chronic and progressive nature of her neurological d/o. Unfortunately no specific treatment is available at this point.   Will consider a second neurological opinion after reviewing labs  Discussed LTC planning with them including getting help at home  Our NN spent over 25 minutes with them to help with situation at home

## 2017-04-04 NOTE — PROGRESS NOTES
Reviewed record in preparation for visit and have obtained necessary documentation. Identified pt with two pt identifiers(name and ). Health Maintenance Due   Topic    Hepatitis C Screening     DTaP/Tdap/Td series (1 - Tdap)    FOBT Q 1 YEAR AGE 54-65     ZOSTER VACCINE AGE 60>     GLAUCOMA SCREENING Q2Y     Pneumococcal 65+ Low/Medium Risk (1 of 2 - PCV13)    INFLUENZA AGE 9 TO ADULT          Chief Complaint   Patient presents with    Fall    Extremity Weakness     lower     Follow Up Chronic Condition    Pain (Chronic)          Learning Assessment:  :     Learning Assessment 2013   PRIMARY LEARNER Patient Patient Patient   HIGHEST LEVEL OF EDUCATION - PRIMARY LEARNER  - GRADUATED HIGH SCHOOL OR GED GRADUATED HIGH SCHOOL OR GED   BARRIERS PRIMARY LEARNER - NONE NONE   CO-LEARNER CAREGIVER - No -   PRIMARY LANGUAGE ENGLISH ENGLISH ENGLISH   LEARNER PREFERENCE PRIMARY LISTENING LISTENING LISTENING   LEARNING SPECIAL TOPICS - - no   ANSWERED BY patient patient patient   RELATIONSHIP SELF SELF SELF       Depression Screening:  :     PHQ 2 / 9, over the last two weeks 2015   Little interest or pleasure in doing things Several days   Feeling down, depressed or hopeless Several days   Total Score PHQ 2 2       Fall Risk Assessment:  :     Fall Risk Assessment, last 12 mths 2017   Able to walk? No   Fall in past 12 months? -   Fall with injury? -   Number of falls in past 12 months -   Fall Risk Score -       Abuse Screening:  :     Abuse Screening Questionnaire 2014   Do you ever feel afraid of your partner? N N N N   Are you in a relationship with someone who physically or mentally threatens you? N N N N   Is it safe for you to go home?  Y Y Y Y       Coordination of Care Questionnaire:  :     1) Have you been to an emergency room, urgent care clinic since your last visit? no   Hospitalized since your last visit? no             2) Have you seen or consulted any other health care providers outside of [address] [place] [address] since your last visit? no  (Include any pap smears or colon screenings in this section.)    3) Do you have an Advance Directive on file? no    4) Are you interested in receiving information on Advance Directives? YES      Patient is accompanied by self I have received verbal consent from Hernán Patel to discuss any/all medical information while they are present in the room.

## 2017-04-04 NOTE — LETTER
4/14/2017 2:04 PM 
 
Ms. Ramírez 73 [address] [facility] [address] Dear Roxanne Vázquez: Please find your most recent results below. Resulted Orders CBC W/O DIFF Result Value Ref Range WBC 6.1 3.4 - 10.8 x10E3/uL  
 RBC 4.73 3.77 - 5.28 x10E6/uL HGB 14.4 11.1 - 15.9 g/dL HCT 41.3 34.0 - 46.6 % MCV 87 79 - 97 fL  
 MCH 30.4 26.6 - 33.0 pg  
 MCHC 34.9 31.5 - 35.7 g/dL  
 RDW 13.8 12.3 - 15.4 % PLATELET 814 944 - 258 x10E3/uL Narrative Performed at:  [address]  [id number] : Jamari Davis MD, Phone:  [phone] METABOLIC PANEL, COMPREHENSIVE Result Value Ref Range Glucose 89 65 - 99 mg/dL BUN 10 8 - 27 mg/dL Creatinine 0.76 0.57 - 1.00 mg/dL GFR est non-AA 82 >59 mL/min/1.73 GFR est AA 95 >59 mL/min/1.73  
 BUN/Creatinine ratio 13 12 - 28 Comment: **Please note reference interval change** Sodium 137 134 - 144 mmol/L Potassium 3.7 3.5 - 5.2 mmol/L Chloride 95 (L) 96 - 106 mmol/L  
 CO2 24 18 - 29 mmol/L Calcium 9.9 8.7 - 10.3 mg/dL Protein, total 7.5 6.0 - 8.5 g/dL Albumin 4.4 3.6 - 4.8 g/dL GLOBULIN, TOTAL 3.1 1.5 - 4.5 g/dL A-G Ratio 1.4 1.2 - 2.2 Comment: **Please note reference interval change** Bilirubin, total 0.5 0.0 - 1.2 mg/dL Alk. phosphatase 85 39 - 117 IU/L  
 AST (SGOT) 32 0 - 40 IU/L  
 ALT (SGPT) 20 0 - 32 IU/L Narrative Performed at:  [address]  [id number] : Jamari Davis MD, Phone:  [phone] VITAMIN D, 25 HYDROXY Result Value Ref Range VITAMIN D, 25-HYDROXY 37.9 30.0 - 100.0 ng/mL Comment:  
   Vitamin D deficiency has been defined by the [facility] practice guideline as a 
level of serum 25-OH vitamin D less than 20 ng/mL (1,2). The Endocrine Society went on to further define vitamin D 
insufficiency as a level between 21 and 29 ng/mL (2). 1. IOM ([place] of [employer]). 2010. Dietary reference 
   intakes for calcium and D. [facility]: The 
   [employer]. 2. Arnold MF, Marcella RAINES, Jaren GALLARDO, et al. 
   Evaluation, treatment, and prevention of vitamin D 
   deficiency: an Endocrine Society clinical practice 
   guideline. JCEM. 2011 Jul; 96(7):1911-30. Narrative Performed at:  [address]  [id number] : May Bansal MD, Phone:  [phone] TSH 3RD GENERATION Result Value Ref Range TSH 2.280 0.450 - 4.500 uIU/mL Narrative Performed at:  [address]  [id number] : May Bansal MD, Phone:  [phone] LIPID PANEL Result Value Ref Range Cholesterol, total 210 (H) 100 - 199 mg/dL Triglyceride 79 0 - 149 mg/dL HDL Cholesterol 77 >39 mg/dL VLDL, calculated 16 5 - 40 mg/dL LDL, calculated 117 (H) 0 - 99 mg/dL Narrative Performed at:  [address]  [id number] : May Bansal MD, Phone:  [phone] MISAEL QL, W/REFLEX CASCADE Result Value Ref Range MISAEL Direct Positive (A) Negative See below Comment Comment:  
   Autoantibody                       Disease Association 
____________________________________________________________ Condition                  Frequency 
_____________________   ________________________   _________ Antinuclear Antibody,    SLE, mixed connective Direct (MISAEL-D)           tissue diseases 
_____________________   ________________________   _________ 
dsDNA                    SLE                        40 - 60% 
_____________________   ________________________   _________ Chromatin                Drug induced SLE                90% SLE                        48 - 97% 
_____________________   ________________________   _________ 
Fermin Kays (Ro)                 SLE                        25 - 35% Sjogren's Syndrome         40 - 70%  Lupus                 100% 
_____________________   ________________________   _________ 
SSB ([place])                 SLE 10% Sjogren's Syndrome              30% 
_____________________   _______________________    _________ Sm (anti-Smith)          SLE                        15 - 30% 
_____________________   _______________________    _________ 
RNP                      Mixed Connective Tissue Disease                         95% 
(U1 nRNP,                SLE                        30 - 50% 
anti-ribonucleoprotein)  Polymyositis and/or Dermatomyositis                 20% 
_____________________   ________________________   _________ Scl-70 (antiDNA          Scleroderma (diffuse)      20 - 35% 
topoisomerase)           Crest                           13% 
_____________________   ________________________   _________ Hallie-1                     Polymyositis and/or Dermatomyositis            20 - 40% 
_____________________   ________________________   _________ Centromere B             Scleroderma - 
 Crest 
                         variant                         80% 
_____________________   ________________________   _________ Ribosomal P              SLE                        10 - 20% Narrative Performed at:  [address], [address] [place] [address]  [id number] : Mina Pretty MD, Phone:  [phone] CRP, HIGH SENSITIVITY Result Value Ref Range C-Reactive Protein, Cardiac 4.64 (H) 0.00 - 3.00 mg/L Comment: Relative Risk for Future Cardiovascular Event Low                 <1.00 Average       1.00 - 3.00 High                >3.00 Narrative Performed at:  [address]  [id number] : Jeanne Al MD, Phone:  [phone] CK Result Value Ref Range Creatine Kinase,Total 64 24 - 173 U/L Narrative Performed at:  [address]  [id number] : Jeanne Al MD, Phone:  [phone] DSDNA AB REFLEX Result Value Ref Range  
 dsDNA Ab, Qt. <1 0 - 9 IU/mL Comment:  
                                      Negative      <5 Equivocal  5 - 9 Positive      >9 Narrative Performed at:  [address]  [id number] : Jeanne Al MD, Phone:  [phone] [facility] Result Value Ref Range Gill Antibodies <0.2 0.0 - 0.9 AI Narrative Performed at:  [address]  [id number] : Jeanne Al MD, Phone:  [phone] GILL/RNP ABS REFLEX Result Value Ref Range Smith/RNP Abs 1.0 (H) 0.0 - 0.9 AI Narrative Performed at:  [address]  [id number] : Jeanne Al MD, Phone:  [phone] CVD REPORT Result Value Ref Range INTERPRETATION Note Comment:  
   Supplement report is available. Narrative Performed at:  [address] [place] [address] A [address] [place] [address]  [id number] : Susanne Zeng PhD, Phone:  [phone] RECOMMENDATIONS: 
 
MISAEL High CRP Otherwise All labs are stable Please call me if you have any questions: [phone] Sincerely, 
 
 
Shasha Gaffney, DO

## 2017-04-05 LAB
25(OH)D3+25(OH)D2 SERPL-MCNC: 37.9 NG/ML (ref 30–100)
ALBUMIN SERPL-MCNC: 4.4 G/DL (ref 3.6–4.8)
ALBUMIN/GLOB SERPL: 1.4 {RATIO} (ref 1.2–2.2)
ALP SERPL-CCNC: 85 IU/L (ref 39–117)
ALT SERPL-CCNC: 20 IU/L (ref 0–32)
ANA SER QL: POSITIVE
AST SERPL-CCNC: 32 IU/L (ref 0–40)
BILIRUB SERPL-MCNC: 0.5 MG/DL (ref 0–1.2)
BUN SERPL-MCNC: 10 MG/DL (ref 8–27)
BUN/CREAT SERPL: 13 (ref 12–28)
CALCIUM SERPL-MCNC: 9.9 MG/DL (ref 8.7–10.3)
CHLORIDE SERPL-SCNC: 95 MMOL/L (ref 96–106)
CHOLEST SERPL-MCNC: 210 MG/DL (ref 100–199)
CK SERPL-CCNC: 64 U/L (ref 24–173)
CO2 SERPL-SCNC: 24 MMOL/L (ref 18–29)
CREAT SERPL-MCNC: 0.76 MG/DL (ref 0.57–1)
CRP SERPL HS-MCNC: 4.64 MG/L (ref 0–3)
DSDNA AB SER-ACNC: <1 IU/ML (ref 0–9)
ENA SM AB SER-ACNC: <0.2 AI (ref 0–0.9)
ENA SM+RNP AB SER-ACNC: 1 AI (ref 0–0.9)
ERYTHROCYTE [DISTWIDTH] IN BLOOD BY AUTOMATED COUNT: 13.8 % (ref 12.3–15.4)
GLOBULIN SER CALC-MCNC: 3.1 G/DL (ref 1.5–4.5)
GLUCOSE SERPL-MCNC: 89 MG/DL (ref 65–99)
HCT VFR BLD AUTO: 41.3 % (ref 34–46.6)
HDLC SERPL-MCNC: 77 MG/DL
HGB BLD-MCNC: 14.4 G/DL (ref 11.1–15.9)
INTERPRETATION, 910389: NORMAL
LDLC SERPL CALC-MCNC: 117 MG/DL (ref 0–99)
MCH RBC QN AUTO: 30.4 PG (ref 26.6–33)
MCHC RBC AUTO-ENTMCNC: 34.9 G/DL (ref 31.5–35.7)
MCV RBC AUTO: 87 FL (ref 79–97)
PLATELET # BLD AUTO: 267 X10E3/UL (ref 150–379)
POTASSIUM SERPL-SCNC: 3.7 MMOL/L (ref 3.5–5.2)
PROT SERPL-MCNC: 7.5 G/DL (ref 6–8.5)
RBC # BLD AUTO: 4.73 X10E6/UL (ref 3.77–5.28)
SEE BELOW:, 164879: ABNORMAL
SODIUM SERPL-SCNC: 137 MMOL/L (ref 134–144)
TRIGL SERPL-MCNC: 79 MG/DL (ref 0–149)
TSH SERPL DL<=0.005 MIU/L-ACNC: 2.28 UIU/ML (ref 0.45–4.5)
VLDLC SERPL CALC-MCNC: 16 MG/DL (ref 5–40)
WBC # BLD AUTO: 6.1 X10E3/UL (ref 3.4–10.8)

## 2017-04-06 NOTE — PROGRESS NOTES
+ MISAEL   High CRP  ?? Significance  O/w All labs are stable    Dr. Roxane Schulte: Could you take a look and comment on this. Could she benefit from a Rheumatology consult?

## 2017-04-07 ENCOUNTER — PATIENT OUTREACH (OUTPATIENT)
Dept: INTERNAL MEDICINE CLINIC | Age: 67
End: 2017-04-07

## 2017-04-07 NOTE — PROGRESS NOTES
Called pt and left VM for her and/or  to contact writer in regards to the information discussed at [address] Pt returned call. Informed that  was already at work and also working another early day tomorrow. Explains  plans on looking into information next week and will get back with writer when he is able to determine what he is able to arrange on his own and what will require additional assistance through the office. Voiced appreciation to patient and informed that there was no rush on the process, but writer just wanted to make self available as needed and for patient and  to feel comfortable reaching out for further assistance. Pt voices appreciation. Pt states they have just been very busy lately and there is a lot going on, almost not knowing what needs to be handled next. Pt also mentions that their dog has recently had a surgery and is recovering. Writer voices understanding to the busy schedule and the care of their pet. With discussion of the busy schedule and sounding very overwhelmed with events, writer questions if pt has anyone that she is able to speak to like a counselor or a therapist. Pt states no, she has no one to talk to, not even friends or family. Writer mentions to patient that her appointment with Dr. Ever Duncan notes possibly following up with a psychiatrist as that may have a hand at some of the memory problems we discussed during pt's OV. Asked pt if she has considered speaking to someone professionally. Pt shares that she has spoken to someone in the past, but did not find that treatment beneficial. States that she was having family struggles and when she would explain this to the doctor, she would request what doctor felt she should do in the situation. Pt states the doctor would then ask what she thought she should do.  Pt states that it was a very frustrating situation because if she wanted to ask herself what to do, she could have done that without having been charged for it. Voiced understanding to this feeling and expressed that not all doctors are like that as many things have changed through the years. It may be something to consider giving another try if pt would be open to it. Pt verbalizes being so busy, that if she were open to it, now would not be the time. Writer verbalizes understanding and does not press the subject any further. Writer recalls to earlier discussion and let's pt know that Elias Villa is available when pt and  are ready to reach out. Reminded pt that  has writer's business card and may call at any time. Will do best to help out and point in the right direction. Wished pt a good day. Call ended.

## 2017-04-22 ENCOUNTER — HOSPITAL ENCOUNTER (EMERGENCY)
Age: 67
Discharge: HOME | End: 2017-04-22
Attending: EMERGENCY MEDICINE | Admitting: EMERGENCY MEDICINE
Payer: COMMERCIAL

## 2017-04-22 ENCOUNTER — APPOINTMENT (OUTPATIENT)
Dept: CT IMAGING | Age: 67
End: 2017-04-22
Attending: EMERGENCY MEDICINE
Payer: COMMERCIAL

## 2017-04-22 VITALS
DIASTOLIC BLOOD PRESSURE: 75 MMHG | TEMPERATURE: 98.2 F | BODY MASS INDEX: 25.16 KG/M2 | OXYGEN SATURATION: 97 % | HEIGHT: 65 IN | HEART RATE: 88 BPM | WEIGHT: 151 LBS | SYSTOLIC BLOOD PRESSURE: 123 MMHG | RESPIRATION RATE: 16 BRPM

## 2017-04-22 DIAGNOSIS — R29.6 FREQUENT FALLS: Primary | ICD-10-CM

## 2017-04-22 DIAGNOSIS — R26.9 GAIT DIFFICULTY: ICD-10-CM

## 2017-04-22 DIAGNOSIS — G31.9 BRAIN ATROPHY (HCC): ICD-10-CM

## 2017-04-22 LAB
ALBUMIN SERPL BCP-MCNC: 4 G/DL (ref 3.5–5)
ALBUMIN/GLOB SERPL: 1.1 {RATIO} (ref 1.1–2.2)
ALP SERPL-CCNC: 89 U/L (ref 45–117)
ALT SERPL-CCNC: 27 U/L (ref 12–78)
ANION GAP BLD CALC-SCNC: 8 MMOL/L (ref 5–15)
APPEARANCE UR: ABNORMAL
AST SERPL W P-5'-P-CCNC: 28 U/L (ref 15–37)
BACTERIA URNS QL MICRO: ABNORMAL /HPF
BASOPHILS # BLD AUTO: 0 K/UL (ref 0–0.1)
BASOPHILS # BLD: 0 % (ref 0–1)
BILIRUB SERPL-MCNC: 0.6 MG/DL (ref 0.2–1)
BILIRUB UR QL CFM: NEGATIVE
BUN SERPL-MCNC: 20 MG/DL (ref 6–20)
BUN/CREAT SERPL: 20 (ref 12–20)
CALCIUM SERPL-MCNC: 9.8 MG/DL (ref 8.5–10.1)
CHLORIDE SERPL-SCNC: 93 MMOL/L (ref 97–108)
CO2 SERPL-SCNC: 31 MMOL/L (ref 21–32)
COLOR UR: ABNORMAL
CREAT SERPL-MCNC: 0.98 MG/DL (ref 0.55–1.02)
EOSINOPHIL # BLD: 0 K/UL (ref 0–0.4)
EOSINOPHIL NFR BLD: 1 % (ref 0–7)
EPITH CASTS URNS QL MICRO: ABNORMAL /LPF
ERYTHROCYTE [DISTWIDTH] IN BLOOD BY AUTOMATED COUNT: 12.6 % (ref 11.5–14.5)
GLOBULIN SER CALC-MCNC: 3.8 G/DL (ref 2–4)
GLUCOSE SERPL-MCNC: 107 MG/DL (ref 65–100)
GLUCOSE UR STRIP.AUTO-MCNC: NEGATIVE MG/DL
HCT VFR BLD AUTO: 45.5 % (ref 35–47)
HGB BLD-MCNC: 16 G/DL (ref 11.5–16)
HGB UR QL STRIP: NEGATIVE
KETONES UR QL STRIP.AUTO: ABNORMAL MG/DL
LEUKOCYTE ESTERASE UR QL STRIP.AUTO: NEGATIVE
LYMPHOCYTES # BLD AUTO: 12 % (ref 12–49)
LYMPHOCYTES # BLD: 1 K/UL (ref 0.8–3.5)
MCH RBC QN AUTO: 30.4 PG (ref 26–34)
MCHC RBC AUTO-ENTMCNC: 35.2 G/DL (ref 30–36.5)
MCV RBC AUTO: 86.3 FL (ref 80–99)
MONOCYTES # BLD: 0.8 K/UL (ref 0–1)
MONOCYTES NFR BLD AUTO: 10 % (ref 5–13)
NEUTS SEG # BLD: 6.3 K/UL (ref 1.8–8)
NEUTS SEG NFR BLD AUTO: 77 % (ref 32–75)
NITRITE UR QL STRIP.AUTO: NEGATIVE
PH UR STRIP: 6 [PH] (ref 5–8)
PLATELET # BLD AUTO: 233 K/UL (ref 150–400)
POTASSIUM SERPL-SCNC: 3.3 MMOL/L (ref 3.5–5.1)
PROT SERPL-MCNC: 7.8 G/DL (ref 6.4–8.2)
PROT UR STRIP-MCNC: NEGATIVE MG/DL
RBC # BLD AUTO: 5.27 M/UL (ref 3.8–5.2)
RBC #/AREA URNS HPF: ABNORMAL /HPF (ref 0–5)
SODIUM SERPL-SCNC: 132 MMOL/L (ref 136–145)
SP GR UR REFRACTOMETRY: 1.03 (ref 1–1.03)
TROPONIN I SERPL-MCNC: <0.04 NG/ML
UROBILINOGEN UR QL STRIP.AUTO: 0.2 EU/DL (ref 0.2–1)
WBC # BLD AUTO: 8.2 K/UL (ref 3.6–11)
WBC URNS QL MICRO: ABNORMAL /HPF (ref 0–4)

## 2017-04-22 PROCEDURE — 84484 ASSAY OF TROPONIN QUANT: CPT | Performed by: EMERGENCY MEDICINE

## 2017-04-22 PROCEDURE — 81001 URINALYSIS AUTO W/SCOPE: CPT | Performed by: EMERGENCY MEDICINE

## 2017-04-22 PROCEDURE — 36415 COLL VENOUS BLD VENIPUNCTURE: CPT | Performed by: EMERGENCY MEDICINE

## 2017-04-22 PROCEDURE — 80053 COMPREHEN METABOLIC PANEL: CPT | Performed by: EMERGENCY MEDICINE

## 2017-04-22 PROCEDURE — 70450 CT HEAD/BRAIN W/O DYE: CPT

## 2017-04-22 PROCEDURE — 72125 CT NECK SPINE W/O DYE: CPT

## 2017-04-22 PROCEDURE — 99285 EMERGENCY DEPT VISIT HI MDM: CPT

## 2017-04-22 PROCEDURE — 85025 COMPLETE CBC W/AUTO DIFF WBC: CPT | Performed by: EMERGENCY MEDICINE

## 2017-04-22 NOTE — PROGRESS NOTES
Care Management ED Assessment    **CM consult:  Case management Senior Services, PT Senior Services**    Linda Rubalcava is a 78 y.o. Female who presents from home via EMS with chief complaint of ground level fall. Recent history of falls. Spouse works outside of the home, 6-7 hours 6 times a week. Discussed safety with him, Mrs. Dyana Fairchild had worked with home PT and Outpatient therapy at [facility]. Initially she had some improvement with weakness. She has had follow up with PCP and Neurology. She has used [facility] in the past and [facility] Outpatient    Verified face sheet and demographics.      PCP: Elan Page, DO     Care Management Interventions  PCP Verified by CM: Yes (Dr. Bert Gonzalez)  Last Visit to PCP: 04/04/17  Transition of Care Consult (CM Consult): Discharge Planning (Discharge planning:  Discussed home health services, personal care, and adult day care. Handouts given to spouse.  )  MyChart Signup: No  Discharge Durable Medical Equipment: No (Rollator, walker.)  Health Maintenance Reviewed: Yes  Physical Therapy Consult: No  Occupational Therapy Consult: No  Speech Therapy Consult: No  Current Support Network: Lives with Spouse (Lives with spouse, spouse works outside of the home. aprrox 6-7 hours 6 days weekly, .  )  Plan discussed with Pt/Family/Caregiver: Yes (Met with patient and spouse  Discussed discharge planning. )  Freedom of Choice Offered: Yes    Care Management sent [employer]et message to NN for follow up. List of personal care agencies and adult day care programs given to patient and spouse.       Lois Silva, RN, BSN, [facility]  ED Care Management  [phone]

## 2017-04-22 NOTE — DISCHARGE INSTRUCTIONS
We hope that we have addressed all of your medical concerns. The examination and treatment you received in the Emergency Department were for an emergent problem and were not intended as complete care. It is important that you follow up with your healthcare provider(s) for ongoing care. If your symptoms worsen or do not improve as expected, and you are unable to reach your usual health care provider(s), you should return to the Emergency Department. Today's healthcare is undergoing tremendous change, and patient satisfaction surveys are one of the many tools to assess the quality of medical care. You may receive a survey from the [employer] organization regarding your experience in the Emergency Department. I hope that your experience has been completely positive, particularly the medical care that I provided. As such, please participate in the survey; anything less than excellent does not meet my expectations or intentions. [facility] and [facility] participate in nationally recognized quality of care measures. If your blood pressure is greater than 120/80, as reported below, we urge that you seek medical care to address the potential of high blood pressure, commonly known as hypertension. Hypertension can be hereditary or can be caused by certain medical conditions, pain, stress, or \"white coat syndrome. \"       Please make an appointment with your health care provider(s) for follow up of your Emergency Department visit. VITALS:   Patient Vitals for the past 8 hrs:   Temp Pulse Resp BP SpO2   04/22/17 1600 - - - 125/77 97 %   04/22/17 1530 - - - 128/74 97 %   04/22/17 1430 - - - 127/82 96 %   04/22/17 1400 - - - 128/86 96 %   04/22/17 1342 98.2 °F (36.8 °C) (!) 106 16 135/82 97 %          Thank you for allowing us to provide you with medical care today. We realize that you have many choices for your emergency care needs.   Please choose us in the future for any continued health care needs. Guy Scheuermann Alonso Goltz, [address] [place], [place] [address].   Office: [phone]            Recent Results (from the past 24 hour(s))   CBC WITH AUTOMATED DIFF    Collection Time: 04/22/17  2:26 PM   Result Value Ref Range    WBC 8.2 3.6 - 11.0 K/uL    RBC 5.27 (H) 3.80 - 5.20 M/uL    HGB 16.0 11.5 - 16.0 g/dL    HCT 45.5 35.0 - 47.0 %    MCV 86.3 80.0 - 99.0 FL    MCH 30.4 26.0 - 34.0 PG    MCHC 35.2 30.0 - 36.5 g/dL    RDW 12.6 11.5 - 14.5 %    PLATELET 684 594 - 320 K/uL    NEUTROPHILS 77 (H) 32 - 75 %    LYMPHOCYTES 12 12 - 49 %    MONOCYTES 10 5 - 13 %    EOSINOPHILS 1 0 - 7 %    BASOPHILS 0 0 - 1 %    ABS. NEUTROPHILS 6.3 1.8 - 8.0 K/UL    ABS. LYMPHOCYTES 1.0 0.8 - 3.5 K/UL    ABS. MONOCYTES 0.8 0.0 - 1.0 K/UL    ABS. EOSINOPHILS 0.0 0.0 - 0.4 K/UL    ABS. BASOPHILS 0.0 0.0 - 0.1 K/UL   TROPONIN I    Collection Time: 04/22/17  3:23 PM   Result Value Ref Range    Troponin-I, Qt. <0.04 <1.33 ng/mL   METABOLIC PANEL, COMPREHENSIVE    Collection Time: 04/22/17  3:23 PM   Result Value Ref Range    Sodium 132 (L) 136 - 145 mmol/L    Potassium 3.3 (L) 3.5 - 5.1 mmol/L    Chloride 93 (L) 97 - 108 mmol/L    CO2 31 21 - 32 mmol/L    Anion gap 8 5 - 15 mmol/L    Glucose 107 (H) 65 - 100 mg/dL    BUN 20 6 - 20 MG/DL    Creatinine 0.98 0.55 - 1.02 MG/DL    BUN/Creatinine ratio 20 12 - 20      GFR est AA >60 >60 ml/min/1.73m2    GFR est non-AA 57 (L) >60 ml/min/1.73m2    Calcium 9.8 8.5 - 10.1 MG/DL    Bilirubin, total 0.6 0.2 - 1.0 MG/DL    ALT (SGPT) 27 12 - 78 U/L    AST (SGOT) 28 15 - 37 U/L    Alk.  phosphatase 89 45 - 117 U/L    Protein, total 7.8 6.4 - 8.2 g/dL    Albumin 4.0 3.5 - 5.0 g/dL    Globulin 3.8 2.0 - 4.0 g/dL    A-G Ratio 1.1 1.1 - 2.2     URINALYSIS W/MICROSCOPIC    Collection Time: 04/22/17  4:06 PM   Result Value Ref Range    Color DARK YELLOW      Appearance CLOUDY (A) CLEAR      Specific gravity 1.026 1.003 - 1.030      pH (UA) 6.0 5.0 - 8.0      Protein NEGATIVE  NEG mg/dL    Glucose NEGATIVE  NEG mg/dL    Ketone TRACE (A) NEG mg/dL    Blood NEGATIVE  NEG      Urobilinogen 0.2 0.2 - 1.0 EU/dL    Nitrites NEGATIVE  NEG      Leukocyte Esterase NEGATIVE  NEG      WBC 0-4 0 - 4 /hpf    RBC 0-5 0 - 5 /hpf    Epithelial cells MANY (A) FEW /lpf    Bacteria 1+ (A) NEG /hpf   BILIRUBIN, CONFIRM    Collection Time: 04/22/17  4:06 PM   Result Value Ref Range    Bilirubin UA, confirm NEGATIVE  NEG         Ct Head Wo Cont    Result Date: 4/22/2017  EXAM:  CT HEAD WO CONT INDICATION:   Recurrent falls. COMPARISON: 9/14/2016. TECHNIQUE: Unenhanced CT of the head was performed using 5 mm images. Brain and bone windows were generated. CT dose reduction was achieved through use of a standardized protocol tailored for this examination and automatic exposure control for dose modulation. FINDINGS:  Atrophic change and periventricular hypoattenuation suggesting microvascular ischemic change are seen. No acute hemorrhage, mass effect, mass lesion, midline shift or extra axial collection is evident. The basilar cisterns are open. No acute infarct is identified. The visualized paranasal sinuses and mastoid air cells are clear. IMPRESSION:  Atrophy. Periventricular microvascular ischemic change. No acute intracranial bleed/shift of the midline. Ct Spine Cerv Wo Cont    Result Date: 4/22/2017  EXAM:  CT CERVICAL SPINE WITHOUT CONTRAST INDICATION:  Fall. Neck pain. COMPARISON: None. TECHNIQUE: Multislice helical CT of the cervical spine was performed without intravenous contrast administration. Sagittal and coronal reconstructions were generated. CT dose reduction was achieved through use of a standardized protocol tailored for this examination and automatic exposure control for dose modulation. CONTRAST: None. FINDINGS: There is reversal of the lower cervical lordosis.  Degenerative change at the anterior C1-C2 articulation and facet arthropathy are seen. There is no fracture or subluxation. The odontoid process is intact. The craniocervical junction is within normal limits. The prevertebral soft tissues are within normal limits. Carotid vascular calcification is noted. IMPRESSION: Cervical spondylosis. No fracture detected. Preventing Falls: Care Instructions  Your Care Instructions  Getting around your home safely can be a challenge if you have injuries or health problems that make it easy for you to fall. Loose rugs and furniture in walkways are among the dangers for many older people who have problems walking or who have poor eyesight. People who have conditions such as arthritis, osteoporosis, or dementia also have to be careful not to fall. You can make your home safer with a few simple measures. Follow-up care is a key part of your treatment and safety. Be sure to make and go to all appointments, and call your doctor if you are having problems. It's also a good idea to know your test results and keep a list of the medicines you take. How can you care for yourself at home? Taking care of yourself  · You may get dizzy if you do not drink enough water. To prevent dehydration, drink plenty of fluids, enough so that your urine is light yellow or clear like water. Choose water and other caffeine-free clear liquids. If you have kidney, heart, or liver disease and have to limit fluids, talk with your doctor before you increase the amount of fluids you drink. · Exercise regularly to improve your strength, muscle tone, and balance. Walk if you can. Swimming may be a good choice if you cannot walk easily. · Have your vision and hearing checked each year or any time you notice a change. If you have trouble seeing and hearing, you might not be able to avoid objects and could lose your balance. · Know the side effects of the medicines you take. Ask your doctor or pharmacist whether the medicines you take can affect your balance.  Sleeping pills or sedatives can affect your balance. · Limit the amount of alcohol you drink. Alcohol can impair your balance and other senses. · Ask your doctor whether calluses or corns on your feet need to be removed. If you wear loose-fitting shoes because of calluses or corns, you can lose your balance and fall. · Talk to your doctor if you have numbness in your feet. Preventing falls at home  · Remove raised doorway thresholds, throw rugs, and clutter. Repair loose carpet or raised areas in the floor. · Move furniture and electrical cords to keep them out of walking paths. · Use nonskid floor wax, and wipe up spills right away, especially on ceramic tile floors. · If you use a walker or cane, put rubber tips on it. If you use crutches, clean the bottoms of them regularly with an abrasive pad, such as steel wool. · Keep your house well lit, especially Gaby Cumber, and outside walkways. Use night-lights in areas such as hallways and bathrooms. Add extra light switches or use remote switches (such as switches that go on or off when you clap your hands) to make it easier to turn lights on if you have to get up during the night. · Install sturdy handrails on stairways. · Move items in your cabinets so that the things you use a lot are on the lower shelves (about waist level). · Keep a cordless phone and a flashlight with new batteries by your bed. If possible, put a phone in each of the main rooms of your house, or carry a cell phone in case you fall and cannot reach a phone. Or, you can wear a device around your neck or wrist. You push a button that sends a signal for help. · Wear low-heeled shoes that fit well and give your feet good support. Use footwear with nonskid soles. Check the heels and soles of your shoes for wear. Repair or replace worn heels or soles. · Do not wear socks without shoes on wood floors. · Walk on the grass when the sidewalks are slippery.  If you live in an area that gets snow and ice in the winter, sprinkle salt on slippery steps and sidewalks. Preventing falls in the bath  · Install grab bars and nonskid mats inside and outside your shower or tub and near the toilet and sinks. · Use shower chairs and bath benches. · Use a hand-held shower head that will allow you to sit while showering. · Get into a tub or shower by putting the weaker leg in first. Get out of a tub or shower with your strong side first.  · Repair loose toilet seats and consider installing a raised toilet seat to make getting on and off the toilet easier. · Keep your bathroom door unlocked while you are in the shower. Where can you learn more? Go to http://guanaco-alexander.info/. Enter [phone] [id number] in the search box to learn more about \"Preventing Falls: Care Instructions. \"  Current as of: August 4, 2016  Content Version: 11.2  © 0397-2677 [employer]. Care instructions adapted under license by [employer] (which disclaims liability or warranty for this information). If you have questions about a medical condition or this instruction, always ask your healthcare professional. [place] [address] any warranty or liability for your use of this information. We hope that we have addressed all of your medical concerns. The examination and treatment you received in the Emergency Department were for an emergent problem and were not intended as complete care. It is important that you follow up with your healthcare provider(s) for ongoing care. If your symptoms worsen or do not improve as expected, and you are unable to reach your usual health care provider(s), you should return to the Emergency Department. Today's healthcare is undergoing tremendous change, and patient satisfaction surveys are one of the many tools to assess the quality of medical care.   You may receive a survey from the [employer] regarding your experience in the Emergency Department. I hope that your experience has been completely positive, particularly the medical care that I provided. As such, please participate in the survey; anything less than excellent does not meet my expectations or intentions. [facility] and [facility] participate in nationally recognized quality of care measures. If your blood pressure is greater than 120/80, as reported below, we urge that you seek medical care to address the potential of high blood pressure, commonly known as hypertension. Hypertension can be hereditary or can be caused by certain medical conditions, pain, stress, or \"white coat syndrome. \"       Please make an appointment with your health care provider(s) for follow up of your Emergency Department visit. VITALS:   Patient Vitals for the past 8 hrs:   Temp Pulse Resp BP SpO2   04/22/17 1600 - - - 125/77 97 %   04/22/17 1342 98.2 °F (36.8 °C) (!) 106 16 135/82 97 %          Thank you for allowing us to provide you with medical care today. We realize that you have many choices for your emergency care needs. Please choose us in the future for any continued health care needs. Brain Kiley Rip Estrada, [address] [facility]   Office: [phone]            Recent Results (from the past 24 hour(s))   CBC WITH AUTOMATED DIFF    Collection Time: 04/22/17  2:26 PM   Result Value Ref Range    WBC 8.2 3.6 - 11.0 K/uL    RBC 5.27 (H) 3.80 - 5.20 M/uL    HGB 16.0 11.5 - 16.0 g/dL    HCT 45.5 35.0 - 47.0 %    MCV 86.3 80.0 - 99.0 FL    MCH 30.4 26.0 - 34.0 PG    MCHC 35.2 30.0 - 36.5 g/dL    RDW 12.6 11.5 - 14.5 %    PLATELET 449 622 - 160 K/uL    NEUTROPHILS 77 (H) 32 - 75 %    LYMPHOCYTES 12 12 - 49 %    MONOCYTES 10 5 - 13 %    EOSINOPHILS 1 0 - 7 %    BASOPHILS 0 0 - 1 %    ABS. NEUTROPHILS 6.3 1.8 - 8.0 K/UL    ABS. LYMPHOCYTES 1.0 0.8 - 3.5 K/UL    ABS. MONOCYTES 0.8 0.0 - 1.0 K/UL    ABS.  EOSINOPHILS 0.0 0.0 - 0.4 K/UL    ABS. BASOPHILS 0.0 0.0 - 0.1 K/UL       Ct Head Wo Cont    Result Date: 4/22/2017  EXAM:  CT HEAD WO CONT INDICATION:   Recurrent falls. COMPARISON: 9/14/2016. TECHNIQUE: Unenhanced CT of the head was performed using 5 mm images. Brain and bone windows were generated. CT dose reduction was achieved through use of a standardized protocol tailored for this examination and automatic exposure control for dose modulation. FINDINGS:  Atrophic change and periventricular hypoattenuation suggesting microvascular ischemic change are seen. No acute hemorrhage, mass effect, mass lesion, midline shift or extra axial collection is evident. The basilar cisterns are open. No acute infarct is identified. The visualized paranasal sinuses and mastoid air cells are clear. IMPRESSION:  Atrophy. Periventricular microvascular ischemic change. No acute intracranial bleed/shift of the midline. Ct Spine Cerv Wo Cont    Result Date: 4/22/2017  EXAM:  CT CERVICAL SPINE WITHOUT CONTRAST INDICATION:  Fall. Neck pain. COMPARISON: None. TECHNIQUE: Multislice helical CT of the cervical spine was performed without intravenous contrast administration. Sagittal and coronal reconstructions were generated. CT dose reduction was achieved through use of a standardized protocol tailored for this examination and automatic exposure control for dose modulation. CONTRAST: None. FINDINGS: There is reversal of the lower cervical lordosis. Degenerative change at the anterior C1-C2 articulation and facet arthropathy are seen. There is no fracture or subluxation. The odontoid process is intact. The craniocervical junction is within normal limits. The prevertebral soft tissues are within normal limits. Carotid vascular calcification is noted. IMPRESSION: Cervical spondylosis. No fracture detected.

## 2017-04-22 NOTE — ED TRIAGE NOTES
Triage note: Pt arrived via EMS from home after falling several times recently. Pt hit her head on 4/18/2017 and has a knot on the back of her head.

## 2017-04-22 NOTE — ED PROVIDER NOTES
HPI Comments: 78 y.o. female with past medical history significant for HTN, CAD and hypokalaemia who presents from home via EMS with chief complaint of GLF. Per pt she lives at home alone and had just walked down the stairs of her home, into the hallway when she fell face first onto the floor. Upon impact, the pt states she hit her head and nose. Per significant other, the pt has hx of several falls with her last fall prior to today's (4/22/2017) occurring on Tuesday (4/18/2017). He states that the pt fell and hit the back of her head while in the shower which resulted in a knot to the posterior portion of her scalp. He adds that since the fall on Tuesday, the pt has appeared somewhat more confused than usual. The pt states she was not evaluated by a physician following her fall. The significant other reports that the pt has been talking to him in third person at times and has experienced visual hallucination (cobwebs that were not there). Per person, she has suffered head trauma over 15+ times in her life. She adds that she has been seen by a Neuropsychologist in the past which required testing and showed no signs of concern. When asked if the pt has been able to cook and dress herself at home, she states she has not due to the fear that she will fall. Her significant other adds that the pt does have the capability to dress herself yet, has limited mobility which makes it difficult. The pt was asked how she spends most of her time at home, in which she states that she spends a large portion of her time watching television. Per pt, she does consume ETOH (2 beers daily) yet states her consumption is not related to her falls. The pt denies fever, chills, headache, neck pain, N/V/D and urinary symptoms. There are no other acute medical concerns at this time.     Social hx: Former smoker, Current ETOH use    PCP: Joe Toure DO    Note written by Valeriy Tillman, as dictated by Gregory Mcclellan MD 2:30 PM          The history is provided by the patient and a significant other. Past Medical History:   Diagnosis Date    CAD (coronary artery disease)     Hypertension     Hypokalaemia        Past Surgical History:   Procedure Laterality Date    HX APPENDECTOMY      HX OOPHORECTOMY Right     HX TONSILLECTOMY           Family History:   Problem Relation Age of Onset    Diabetes Mother        Social History     Social History    Marital status:      Spouse name: N/A    Number of children: N/A    Years of education: N/A     Occupational History    Not on file. Social History Main Topics    Smoking status: Former Smoker     Packs/day: 0.50     Quit date: 10/19/2011    Smokeless tobacco: Never Used    Alcohol use 0.6 - 1.2 oz/week     1 - 2 Standard drinks or equivalent per week      Comment: one drink per week    Drug use: No    Sexual activity: Yes     Partners: Male     Other Topics Concern    Not on file     Social History Narrative         ALLERGIES: Pcn [penicillins]    Review of Systems   Constitutional: Negative for appetite change, chills and fever. HENT: Negative for congestion. Eyes: Negative for visual disturbance. Respiratory: Negative for cough, chest tightness, shortness of breath and wheezing. Cardiovascular: Negative for chest pain. Gastrointestinal: Negative for abdominal pain, diarrhea, nausea and vomiting. Genitourinary: Negative for dysuria and frequency. Musculoskeletal: Positive for gait problem (chronic unsteady gait associated with frequent falls. ). Negative for joint swelling. Skin: Negative for rash. Neurological: Negative for speech difficulty and headaches. Psychiatric/Behavioral: Positive for confusion (speaking to others in third person since fall on 4/18/2017) and hallucinations. All other systems reviewed and are negative.       Vitals:    04/22/17 1342   BP: 135/82   Pulse: (!) 106   Resp: 16   Temp: 98.2 °F (36.8 °C)   SpO2: 97% Weight: 68.5 kg (151 lb)   Height: 5' 5\" (1.651 m)            Physical Exam   Constitutional: She is oriented to person, place, and time. She appears well-developed and well-nourished. No distress. Appears lethargic    HENT:   Head: Normocephalic and atraumatic. Nose: Nose normal.   Eyes: Conjunctivae are normal. Pupils are equal, round, and reactive to light. No scleral icterus. Neck: Normal range of motion. Neck supple. No JVD present. No tracheal deviation present. No thyromegaly present. Cardiovascular: Normal rate, regular rhythm and normal heart sounds. No murmur heard. Pulmonary/Chest: Effort normal and breath sounds normal. No respiratory distress. She has no wheezes. She has no rales. Abdominal: Soft. Bowel sounds are normal. She exhibits no mass. There is no tenderness. There is no rebound and no guarding. Musculoskeletal: Normal range of motion. She exhibits no edema. Neurological: She is alert and oriented to person, place, and time. No cranial nerve deficit. Coordination normal.   Skin: Skin is warm and dry. No rash noted. She is not diaphoretic. No erythema. Abrasions to nose, surrounding face and bilateral arms. Psychiatric: She has a normal mood and affect. Her behavior is normal.   Nursing note and vitals reviewed.      Note written by Valeriy Freeman, as dictated by Miranda Jesus MD 2:30 PM    [facility]  ED Course       Procedures

## 2017-04-25 ENCOUNTER — PATIENT OUTREACH (OUTPATIENT)
Dept: INTERNAL MEDICINE CLINIC | Age: 67
End: 2017-04-25

## 2017-04-25 NOTE — PROGRESS NOTES
Flavia Rosa 78 y.o. listed on [facility] on 4/23/17 and message passed along to NN on 4/25/2017. Patient discharged from [address] [facility] for falls on 4/22/17. Nurse Navigator(NN) contacted the patient by telephone to perform post ED discharge assessment. Verified as patient with 2 identifiers. Provided introduction to self, and explanation of the Nurses Navigator role. Call Information: Spoke with patient who informs that she is increasingly concerned with her decline in physical state. Has had fall on 4/18 and also 4/22. Upon conversation at last OV, pt felt there was still hope to continue at home and participate in personal PT that  would be able to assist with and forgo formal in home or outpatient PT. Pt felt as though she could continue to be fairly independent despite husbands increased concern over pt's safety when he was away and out of the home for work. He noted that there was no family or friend support available to them and the closest neighbor also had physical disabilities as well. Pt mentions during this call that they are now willing to consider adult day care or some home care. Pt states her  was given 2 new ideas they are now considering now too. She is upset to have the decline in her legs and unsure of the cause. Pt voices further frustration stating that her  and sister will tell her to move her leg or take a step. States that if she could do that, she would do that. States \"I'm not stupid. They don't need to tell me stuff like that. \" Writer attempted to explain that they are trying to be encouraging and not understanding that it is frustrating for the patient. Pt then states that she realizes they are trying. States that her  is a [address] and he really goes above and beyond to try and do all he can to find ways to help her. Pt would like to come in and have an appointment with Orville Alexander next week.  They need a 30 minute appointment slot because there is so much to discuss. Informed that next Tuesday is best because Dr. Beverley Sandhoff is in the office and if anything comes up for Vida Luna to discuss with the MD, he will be in office that day as well. There are 3 slots available at the time of the call. Pt asked that writer call back and leave a VM so her  can listen to it when he returns from work around [employer]. She also asked that he could call writer back and leave writer VM re: the information he has received and what he has for pt's safety plan. Writer states that VM is private and will be received. Also stated that writer can meet pt and  during their appointment next week as well. Miguel Angel [place] up with pt and returned call and left VM with return call information and phone number to return call.  also had received writer's card during previous OV. Will await return call. Red Flags:  Declining gait, falls, head injury     Discharge Instructions :  Reviewed discharge instructions with patient. Patient verbalizes understanding of discharge instructions and follow-up care. PCP/Specialist Follow Up:   Patient will be scheduled to f/u with JOYCELYN Rangle next week. Reviewed red flags with patient, and patient verbalizes understanding. Opportunity given to ask questions. No other clinical/social/functional needs noted. Plan:  Reviewed plan of care. Patient verbalized understanding and agreement with plan. The patient agrees to contact the PCP office for questions related to their healthcare. NN contact information given to call prn. Goal:  Goals Addressed             Most Recent     Develops appropriate coping skills. On track (4/25/2017)             R/T pt's mobility disability  R/T pt's depression, stress, and anxiety *[facility] 4/4/17       Initiate and reinforce education of the patient/family about management of disease and lifestyle changes.    On track (4/25/2017)             For  r/t pt's neurological/psychological impairments  R/T medication compliance and F/U appointments (specialists)  R/T clear communication skills between pt and /caregiver *[facility] 4/4/17       Supportive resources in place to maintain patient in the community (ie. Home Health, DME equipment, refer to, medication assistant plan, etc.)   On track (4/25/2017)             Attempting to get aide for up to 6 hours per day for 6 days per week  Pt has Elton Conteh, Gait belt *[facility] 4/4/17            Medical History:     Past Medical History:   Diagnosis Date    CAD (coronary artery disease)     Hypertension     Hypokalaemia        Labs Reviewed:  No results for input(s): WBC, HGB, HCT, PLT, HGBEXT, HCTEXT, PLTEXT in the last 72 hours. No results for input(s): NA, K, CL, CO2, GLU, BUN, CREA, CA, MG, PHOS, ALB, TBIL, SGOT, ALT, INR in the last 72 hours. No lab exists for component: INREXT  No components found for: GLPOC  No results for input(s): PH, PCO2, PO2, HCO3, FIO2 in the last 72 hours. No results for input(s): INR in the last 72 hours.     No lab exists for component: INREXT

## 2017-05-02 ENCOUNTER — PATIENT OUTREACH (OUTPATIENT)
Dept: INTERNAL MEDICINE CLINIC | Age: 67
End: 2017-05-02

## 2017-05-02 ENCOUNTER — OFFICE VISIT (OUTPATIENT)
Dept: INTERNAL MEDICINE CLINIC | Age: 67
End: 2017-05-02

## 2017-05-02 VITALS
SYSTOLIC BLOOD PRESSURE: 124 MMHG | DIASTOLIC BLOOD PRESSURE: 87 MMHG | OXYGEN SATURATION: 96 % | HEART RATE: 93 BPM | WEIGHT: 144 LBS | BODY MASS INDEX: 23.99 KG/M2 | HEIGHT: 65 IN | TEMPERATURE: 97.3 F

## 2017-05-02 DIAGNOSIS — F32.A DEPRESSION, UNSPECIFIED DEPRESSION TYPE: ICD-10-CM

## 2017-05-02 DIAGNOSIS — G31.9 NEURODEGENERATIVE DISORDER (HCC): Primary | ICD-10-CM

## 2017-05-02 DIAGNOSIS — I10 ESSENTIAL HYPERTENSION: Chronic | ICD-10-CM

## 2017-05-02 DIAGNOSIS — F41.9 ANXIETY: ICD-10-CM

## 2017-05-02 DIAGNOSIS — Z71.89 ADVANCED CARE PLANNING/COUNSELING DISCUSSION: ICD-10-CM

## 2017-05-02 DIAGNOSIS — R26.9 GAIT DIFFICULTY: ICD-10-CM

## 2017-05-02 DIAGNOSIS — G89.29 OTHER CHRONIC PAIN: ICD-10-CM

## 2017-05-02 DIAGNOSIS — R41.3 MEMORY CHANGE: ICD-10-CM

## 2017-05-02 DIAGNOSIS — R53.1 WEAKNESS GENERALIZED: ICD-10-CM

## 2017-05-02 DIAGNOSIS — R29.6 FREQUENT FALLS: ICD-10-CM

## 2017-05-02 RX ORDER — DIAZEPAM 5 MG/1
TABLET ORAL
Qty: 60 TAB | Refills: 2 | Status: SHIPPED | OUTPATIENT
Start: 2017-05-02 | End: 2017-07-14

## 2017-05-02 RX ORDER — DULOXETIN HYDROCHLORIDE 60 MG/1
60 CAPSULE, DELAYED RELEASE ORAL DAILY
Qty: 30 CAP | Refills: 5 | Status: SHIPPED | OUTPATIENT
Start: 2017-05-02 | End: 2017-07-14

## 2017-05-02 RX ORDER — AMLODIPINE BESYLATE 10 MG/1
10 TABLET ORAL DAILY
Qty: 90 TAB | Refills: 3 | Status: SHIPPED | OUTPATIENT
Start: 2017-05-02 | End: 2021-02-02 | Stop reason: ALTCHOICE

## 2017-05-02 RX ORDER — HYDROCHLOROTHIAZIDE 25 MG/1
25 TABLET ORAL DAILY
Qty: 90 TAB | Refills: 3 | Status: SHIPPED | OUTPATIENT
Start: 2017-05-02 | End: 2017-07-14

## 2017-05-02 NOTE — MR AVS SNAPSHOT
Visit Information Date & Time Provider Department Dept. Phone Encounter #  
 5/2/2017  2:30 PM Deyanira Dinora, [address] [facility] [phone] [id number] Your Appointments 5/15/2017  2:45 PM  
ROUTINE CARE with Teodora Valentin DO [facility] ([address]) Appt Note: 4 mo fuv; 2 month follow up; to review her blood tests [address] [facility] [address]  
[phone]  
  
   
 [address] [facility] [address] Upcoming Health Maintenance Date Due Hepatitis C Screening 1950 DTaP/Tdap/Td series (1 - Tdap) 2/24/1972 FOBT Q 1 YEAR AGE 50-75 2/24/2001 ZOSTER VACCINE AGE 60> 2/24/2011 GLAUCOMA SCREENING Q2Y 2/24/2016 Pneumococcal 65+ Low/Medium Risk (1 of 2 - PCV13) 2/24/2016 MEDICARE YEARLY EXAM 4/9/2017 INFLUENZA AGE 9 TO ADULT 8/1/2017 BREAST CANCER SCRN MAMMOGRAM 4/8/2018 Allergies as of 5/2/2017  Review Complete On: 4/22/2017 By: RT Thai, R Severity Noted Reaction Type Reactions Pcn [Penicillins]  12/16/2009    Swelling Current Immunizations  Reviewed on 2/4/2015 No immunizations on file. Not reviewed this visit You Were Diagnosed With   
  
 Codes Comments Neurological disorder    -  Primary ICD-10-CM: G98.8 ICD-9-CM: 349.9 Weakness generalized     ICD-10-CM: R53.1 ICD-9-CM: 780.79 Falls, subsequent encounter     ICD-10-CM: W19. Latoya Leader ICD-9-CM: V58.89, E888.9 Vitals BP Pulse Temp Height(growth percentile) Weight(growth percentile) SpO2  
 124/87 93 97.3 °F (36.3 °C) (Oral) 5' 5\" (1.651 m) 144 lb (65.3 kg) 96% BMI OB Status Smoking Status 23.96 kg/m2 Postmenopausal Former Smoker Vitals History BMI and BSA Data Body Mass Index Body Surface Area  
 23.96 kg/m 2 1.73 m 2 Preferred Pharmacy Pharmacy Name Phone [facility] [address] [facility] [address] Your Updated Medication List  
  
   
This list is accurate as of: 5/2/17  4:24 PM.  Always use your most recent med list. amLODIPine 10 mg tablet Commonly known as:  Martina Buckner Take 1 Tab by mouth daily. aspirin 325 mg tablet Commonly known as:  ASPIRIN Take 325 mg by mouth daily. diazePAM 5 mg tablet Commonly known as:  VALIUM  
TAKE ONE TABLET BY MOUTH EVERY 12 HOURS AS NEEDED FOR ANXIETY DULoxetine 60 mg capsule Commonly known as:  CYMBALTA Take 1 Cap by mouth daily. ergocalciferol 50,000 unit capsule Commonly known as:  ERGOCALCIFEROL Take 1 Cap by mouth every seven (7) days. hydroCHLOROthiazide 25 mg tablet Commonly known as:  HYDRODIURIL Take 1 Tab by mouth daily. potassium 99 mg tablet Take 99 mg by mouth daily. VITAMIN D3 1,000 unit tablet Generic drug:  cholecalciferol Take 2,000 Units by mouth daily. Prescriptions Printed Refills DULoxetine (CYMBALTA) 60 mg capsule 5 Sig: Take 1 Cap by mouth daily. Class: Print Route: Oral  
 hydroCHLOROthiazide (HYDRODIURIL) 25 mg tablet 3 Sig: Take 1 Tab by mouth daily. Class: Print Route: Oral  
 amLODIPine (NORVASC) 10 mg tablet 3 Sig: Take 1 Tab by mouth daily. Class: Print Route: Oral  
 diazePAM (VALIUM) 5 mg tablet 2 Sig: TAKE ONE TABLET BY MOUTH EVERY 12 HOURS AS NEEDED FOR ANXIETY Class: Print We Performed the Following REFERRAL TO NEUROLOGY [CRM72 Custom] Comments: [facility] neurology- #[phone] REFERRAL TO PHYSICAL THERAPY [KLY84 Custom] Referral Information Referral ID Referred By Referred To  
  
 [id number] Shan Borja E Not Available Visits Status Start Date End Date 1 New Request 5/2/17 5/2/18  If your referral has a status of pending review or denied, additional information will be sent to support the outcome of this decision. Referral ID Referred By Referred To  
 [id number] Mario PALENCIA Not Available Visits Status Start Date End Date 1 New Request 5/2/17 5/2/18 If your referral has a status of pending review or denied, additional information will be sent to support the outcome of this decision. Patient Instructions Preventing Falls: Care Instructions Your Care Instructions Getting around your home safely can be a challenge if you have injuries or health problems that make it easy for you to fall. Loose rugs and furniture in walkways are among the dangers for many older people who have problems walking or who have poor eyesight. People who have conditions such as arthritis, osteoporosis, or dementia also have to be careful not to fall. You can make your home safer with a few simple measures. Follow-up care is a key part of your treatment and safety. Be sure to make and go to all appointments, and call your doctor if you are having problems. It's also a good idea to know your test results and keep a list of the medicines you take. How can you care for yourself at home? Taking care of yourself · You may get dizzy if you do not drink enough water. To prevent dehydration, drink plenty of fluids, enough so that your urine is light yellow or clear like water. Choose water and other caffeine-free clear liquids. If you have kidney, heart, or liver disease and have to limit fluids, talk with your doctor before you increase the amount of fluids you drink. · Exercise regularly to improve your strength, muscle tone, and balance. Walk if you can. Swimming may be a good choice if you cannot walk easily. · Have your vision and hearing checked each year or any time you notice a change. If you have trouble seeing and hearing, you might not be able to avoid objects and could lose your balance. · Know the side effects of the medicines you take. Ask your doctor or pharmacist whether the medicines you take can affect your balance. Sleeping pills or sedatives can affect your balance. · Limit the amount of alcohol you drink. Alcohol can impair your balance and other senses. · Ask your doctor whether calluses or corns on your feet need to be removed. If you wear loose-fitting shoes because of calluses or corns, you can lose your balance and fall. · Talk to your doctor if you have numbness in your feet. Preventing falls at home · Remove raised doorway thresholds, throw rugs, and clutter. Repair loose carpet or raised areas in the floor. · Move furniture and electrical cords to keep them out of walking paths. · Use nonskid floor wax, and wipe up spills right away, especially on ceramic tile floors. · If you use a walker or cane, put rubber tips on it. If you use crutches, clean the bottoms of them regularly with an abrasive pad, such as steel wool. · Keep your house well lit, especially Jolan Moreno, and outside walkways. Use night-lights in areas such as hallways and bathrooms. Add extra light switches or use remote switches (such as switches that go on or off when you clap your hands) to make it easier to turn lights on if you have to get up during the night. · Install sturdy handrails on stairways. · Move items in your cabinets so that the things you use a lot are on the lower shelves (about waist level). · Keep a cordless phone and a flashlight with new batteries by your bed. If possible, put a phone in each of the main rooms of your house, or carry a cell phone in case you fall and cannot reach a phone. Or, you can wear a device around your neck or wrist. You push a button that sends a signal for help. · Wear low-heeled shoes that fit well and give your feet good support. Use footwear with nonskid soles.  Check the heels and soles of your shoes for wear. Repair or replace worn heels or soles. · Do not wear socks without shoes on wood floors. · Walk on the grass when the sidewalks are slippery. If you live in an area that gets snow and ice in the winter, sprinkle salt on slippery steps and sidewalks. Preventing falls in the bath · Install grab bars and nonskid mats inside and outside your shower or tub and near the toilet and sinks. · Use shower chairs and bath benches. · Use a hand-held shower head that will allow you to sit while showering. · Get into a tub or shower by putting the weaker leg in first. Get out of a tub or shower with your strong side first. 
· Repair loose toilet seats and consider installing a raised toilet seat to make getting on and off the toilet easier. · Keep your bathroom door unlocked while you are in the shower. Where can you learn more? Go to http://guanaco-alexander.info/. Enter [phone] in the search box to learn more about \"Preventing Falls: Care Instructions. \" Current as of: August 4, 2016 Content Version: 11.2 © 3925-5717 [email or web address]. Care instructions adapted under license by [employer] (which disclaims liability or warranty for this information). If you have questions about a medical condition or this instruction, always ask your healthcare professional. [place] [address] any warranty or liability for your use of this information. Introducing [facility]! Dear Marlow Babinski: 
Thank you for requesting a [employer] account. Our records indicate that you already have an active [employer] account. You can access your account anytime at [email or web address] Did you know that you can access your hospital and ER discharge instructions at any time in [employer]? You can also review all of your test results from your hospital stay or ER visit. Additional Information If you have questions, please visit the Frequently Asked Questions section of the [employer] website at [email or web address] Remember, [employer] is NOT to be used for urgent needs. For medical emergencies, dial 911. Now available from your iPhone and Android! Please provide this summary of care documentation to your next provider. Your primary care clinician is listed as Finn Blunt. If you have any questions after today's visit, please call [phone].

## 2017-05-02 NOTE — PROGRESS NOTES
HISTORY OF PRESENT ILLNESS  Pérez Ferreira is a 78 y.o. female. This is a patient of Dr. Daksha Arango who presents today with her  for follow-up. The patient's history includes gait difficulty with previous diagnosis of neurodegenerative disorder of unknown etiology. The patient has seen neurology and rheumatology in the past and has undergone PT courses related to chronic pain, gait instability, and tremor. She has also recently seen neuropsychology for evaluation and was recommended to schedule with psychiatry (however, patient has declined this referral today). The patient has had increasing gait weakness and very frequent falls. Her  states she has fallen 21 times in the last month. He is interested in further help with caregiving and is interested in adult day care programs. The patient was seen in the ER 4/22/17 following a fall at home. The patient was noted by her  to have increased memory change after the fall. He mentions that she did not recognize him at times. Patient states she thought it was his brother. CT of head in ER showed atrophy and periventricular microvascular ischemic change without acute process. Visit Vitals    /87    Pulse 93    Temp 97.3 °F (36.3 °C) (Oral)    Ht 5' 5\" (1.651 m)    Wt 144 lb (65.3 kg)    SpO2 96%    BMI 23.96 kg/m2     HPI    Review of Systems   Constitutional: Negative for chills and fever. HENT: Negative for congestion. Eyes: Negative for blurred vision and pain. Respiratory: Negative for cough, shortness of breath and wheezing. Cardiovascular: Negative for chest pain and leg swelling. Gastrointestinal: Negative for abdominal pain, constipation and diarrhea. Genitourinary: Negative. Musculoskeletal: Positive for back pain, falls, joint pain and myalgias. Skin:        Bruising to left knee, left shoulder after a fall   Neurological: Positive for weakness.  Negative for speech change, seizures, loss of consciousness and headaches. Endo/Heme/Allergies: Negative. Psychiatric/Behavioral: The patient is nervous/anxious. Physical Exam   Constitutional: She appears well-developed. HENT:   Head: Normocephalic and atraumatic. Eyes: Conjunctivae are normal.   Neck: Neck supple. Cardiovascular: Normal rate and regular rhythm. Pulmonary/Chest: Effort normal and breath sounds normal. No respiratory distress. She has no wheezes. She has no rales. Abdominal: Soft. She exhibits no distension. Musculoskeletal: She exhibits tenderness. Lower back pain   Neurological: She is alert. Oriented to self and place  MMSE- 25  Generalized weakness   Skin: Skin is warm and dry. Psychiatric: She has a normal mood and affect. Nursing note and vitals reviewed. ASSESSMENT and PLAN    ICD-10-CM ICD-9-CM    1. Neurodegenerative disorder G31.9 331.9 Will order  REFERRAL TO Andrew  neurology   Patient to schedule appointment. Will order  REFERRAL TO PHYSICAL THERAPY   2. Gait difficulty R26.9 781.2 As above. 3. Weakness generalized R53.1 780.79 As above. 4. Frequent falls R29.6 V15.88 As above. 5. Memory change R41.3 780.93 As above. 6. Essential hypertension I10 401.9 Will refill:  hydroCHLOROthiazide (HYDRODIURIL) 25 mg tablet, 1 tab po daily      amLODIPine (NORVASC) 10 mg tablet, 1 tab po daily   7. Depression, unspecified depression type F32.9 311 Will refill:  DULoxetine (CYMBALTA) 60 mg capsule, 1 cap po daily   8. Anxiety F41.9 300.00 Will refill:  diazePAM (VALIUM) 5 mg tablet, 1 tab po q12h for anxiety, #60, 2 refills. 9. Other chronic pain G89.29 338.29 PT as above. 10. Advanced care planning/counseling discussion Z71.89 V65.49 [facility] Will form discussed in detail at office visit and completed. Form now scanned to chart.      Lab results and schedule of future lab studies reviewed with patient  Reviewed medications and side effects in detail  Patient encouraged to call or return to office if symptoms do not improve or worsen. Reviewed plan of care with patient who acknowledges understanding and agrees. Corey Hand, RN Nurse Navigator available to offer education and resources for increasing caregiving assistance. Discussed above plan of care with Dr. Mikie Davidson.

## 2017-05-02 NOTE — PATIENT INSTRUCTIONS

## 2017-05-03 NOTE — PROGRESS NOTES
Spoke with patient and  during [address] to discuss ongoing challenges with patient's ongoing and worsening physical challenges w/gait. Pt is having falls approximately every single day. Often falls and had a very traumatic fall approximately 2 weeks ago hitting the head twice just a few days apart. Pt was stating during OV that she felt as though the forgetfulness and confusion was only temporary and had resolved.  spoke up and stated that even just today pt had forgotten who he was. When he asked who he was, she stated he was Carlie Ward, which is his name, but that she felt he was someone else other than the Carlie Ward she had been  to for the last 28 years. She goes on to explain that she feels as though there are 2 Carlieeric Luevanoalbe'. They are are not imaginary, not a ghost, not a memory, but two physical Carlie Schwalbe' that can be seen and touched, but that they are different, and act different. She feels as though there is something suspicious that is going on and is untrusting of the situation. She then shortly after argues that she does not forget who he is and knows exactly who he is and has not been saying things that don't make sense. Pt goes on to verbalize that she feels as though she is being kept prisoner. That her  doesn't want her to get out of bed, or leave her room. She feels trapped. Carlie Luevanoalwaldo tries to explain he worries about her falling and has rearranged the house so she has her bedroom and media room set up so she has access so there isn't far to go. He comes home in the middle of the day to assure that she is moved from one room to the next. He admits he is worried and stressed at work. He is worried about job security because of needing to come home so often due to the regularly occurring falls at home with his wife. They have no family or friends for support. Carlie Ed has been looking for an adult day care for pt to attend. Pt wishes to know the cost of this.  Carlie Ward states he has spoken to the [facility] which writer voices is a very good place to attend. He tells her it's $79/day. Pt states that's too much. She would be ok with $10/hr, but that is too much. Writer questions what the difference is because if she would be there nearly 8 hours a day, is that not the same cost? Also noting that there would be 2 meals and 2 snacks included per Kanika Luo. Writer states there are also activities and then pt would have friends and get to socialize. She would then have new things to talk about with Kanika Luo and when they sat at dinner they would each have stories to tell one another. It could really change the dynamic of each of their lives. It would also relieve a lot of stress. Writer points out that paying someone and staying at home, pt would still have to provide her own meals, but then also having a stranger in her home is always a risk. She is unable to defend herself if something were to take place. Also, people may seem nice, but you never know if your personal belongings would end up missing or if the house would be broken into because someone now knows the layout of the house. There is not the same level of socialization and making friends and having conversations and getting back \"into the mix\" as it would be to go to an adult hang out like the [facility]. Pt was concerned that now that she does not work, that the finances are tighter. 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 With knowing his wife is being taken care of and making friends and having activities, he will probably become much happier and have less stress and sell more cars and make more money. This would provide more income and then also make for a happier  and improve on some of the dynamics of the relationship.  Writer mentions that both patient and  voice stress in the relationship due to the frustrations of all the changes and suggest that this could truly be part of the solution to turning things around. Writer requested just being open minded to the idea because the cost would be the same, but there are more benefits to the adult center. The NP and MD came back to the room and discussed the pt's ongoing deterioration neurologically with her legs. Dr. Rhea Castillo suggested strongly to f/u with [facility] neurology specialist. He informed that her issues are beyond the PCP level of management and that is where she needs to go. Pt became defiant to going to the specialist and MD explained in depth the importance of following through with the specialist and that the pt is not going to improve continuing as things are at the present time.  voiced frustration because as the pt then agreed to go to the specialist because he said she agrees here, she will then change her mind when she gets home. MD, NP, and writer all impressed upon patient that there is nothing the PCP can do further and therefore she will continue to deteriorate unless she seeks the specialists opinion. NP and writer then remain behind with patient and  to discuss signing ACP. Pt originally very resistant to signing the paperwork because she felt it meant her  could then force her to do anything he wanted her to regardless of how she felt. NP and writer explained that is not the point of the ACP. The documentation was explained in detail to both patient and .  was then asked to step out for a moment and NP and writer spoke in private to the patient. Explained that the  is the next in line to make decisions for the patient in the event the patient was not able to speak for herself any way. However, the paperwork would set in stone he would be the decision maker AND it would outline what SHE wanted rather than randomly letting him make decisions without any of her input at all.  This way, despite if she were unable to speak for herself, her choices would be written out ahead of time. Pt is agreeable to signing ACP if  will also sign an ACP.  is spoken to and agrees to do so. Each person signs an ACP independently and the ACPs have been scanned into the chart and copy given back to patient and . Writer's card with contact information given to  again in case he needs to reach out regarding anything with patient.  asked if they needed to make a f/u appointment at this time. Writer stated that they needed to see the Neurology specialist FIRST before worrying about seeing PCP again unless something else came up. Writer would reach out to them at a later date to check in.  Pt and  voiced appreciation to both writer and NP.

## 2017-05-06 RX ORDER — HYDROCODONE BITARTRATE AND ACETAMINOPHEN 7.5; 325 MG/1; MG/1
1 TABLET ORAL
Qty: 90 TAB | Refills: 0 | Status: SHIPPED | OUTPATIENT
Start: 2017-05-06 | End: 2017-07-10

## 2017-07-10 ENCOUNTER — APPOINTMENT (OUTPATIENT)
Dept: GENERAL RADIOLOGY | Age: 67
DRG: 689 | End: 2017-07-10
Attending: EMERGENCY MEDICINE
Payer: COMMERCIAL

## 2017-07-10 ENCOUNTER — APPOINTMENT (OUTPATIENT)
Dept: CT IMAGING | Age: 67
DRG: 689 | End: 2017-07-10
Attending: EMERGENCY MEDICINE
Payer: COMMERCIAL

## 2017-07-10 ENCOUNTER — HOSPITAL ENCOUNTER (INPATIENT)
Age: 67
LOS: 4 days | Discharge: NURSING, REHAB OR LONG-TERM CARE | DRG: 689 | End: 2017-07-14
Attending: EMERGENCY MEDICINE | Admitting: INTERNAL MEDICINE
Payer: COMMERCIAL

## 2017-07-10 ENCOUNTER — TELEPHONE (OUTPATIENT)
Dept: INTERNAL MEDICINE CLINIC | Age: 67
End: 2017-07-10

## 2017-07-10 DIAGNOSIS — S22.000A COMPRESSION FRACTURE OF BODY OF THORACIC VERTEBRA (HCC): ICD-10-CM

## 2017-07-10 DIAGNOSIS — S05.12XA PERIORBITAL CONTUSION OF LEFT EYE, INITIAL ENCOUNTER: ICD-10-CM

## 2017-07-10 DIAGNOSIS — R41.82 ALTERED MENTAL STATUS, UNSPECIFIED ALTERED MENTAL STATUS TYPE: ICD-10-CM
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
## 2017-07-10 DIAGNOSIS — S80.01XA CONTUSION OF RIGHT KNEE, INITIAL ENCOUNTER: ICD-10-CM

## 2017-07-10 PROBLEM — R07.9 CHEST PAIN: Status: ACTIVE | Noted: 2017-07-10

## 2017-07-10 LAB
ALBUMIN SERPL BCP-MCNC: 3.6 G/DL (ref 3.5–5)
ALBUMIN/GLOB SERPL: 0.7 {RATIO} (ref 1.1–2.2)
ALP SERPL-CCNC: 109 U/L (ref 45–117)
ALT SERPL-CCNC: 18 U/L (ref 12–78)
ANION GAP BLD CALC-SCNC: 14 MMOL/L (ref 5–15)
APPEARANCE UR: CLEAR
AST SERPL W P-5'-P-CCNC: 19 U/L (ref 15–37)
BACTERIA URNS QL MICRO: ABNORMAL /HPF
BASOPHILS # BLD AUTO: 0 K/UL (ref 0–0.1)
BASOPHILS # BLD: 0 % (ref 0–1)
BILIRUB SERPL-MCNC: 0.7 MG/DL (ref 0.2–1)
BILIRUB UR QL: NEGATIVE
BUN SERPL-MCNC: 20 MG/DL (ref 6–20)
BUN/CREAT SERPL: 25 (ref 12–20)
CALCIUM SERPL-MCNC: 9.6 MG/DL (ref 8.5–10.1)
CHLORIDE SERPL-SCNC: 93 MMOL/L (ref 97–108)
CK MB CFR SERPL CALC: NORMAL % (ref 0–2.5)
CK MB SERPL-MCNC: <1 NG/ML (ref 5–25)
CK SERPL-CCNC: 32 U/L (ref 26–192)
CO2 SERPL-SCNC: 23 MMOL/L (ref 21–32)
COLOR UR: ABNORMAL
CREAT SERPL-MCNC: 0.8 MG/DL (ref 0.55–1.02)
D DIMER PPP FEU-MCNC: 1.5 MG/L FEU (ref 0–0.65)
EOSINOPHIL # BLD: 0.1 K/UL (ref 0–0.4)
EOSINOPHIL NFR BLD: 0 % (ref 0–7)
EPITH CASTS URNS QL MICRO: ABNORMAL /LPF
ERYTHROCYTE [DISTWIDTH] IN BLOOD BY AUTOMATED COUNT: 12.6 % (ref 11.5–14.5)
GLOBULIN SER CALC-MCNC: 5.3 G/DL (ref 2–4)
GLUCOSE SERPL-MCNC: 120 MG/DL (ref 65–100)
GLUCOSE UR STRIP.AUTO-MCNC: NEGATIVE MG/DL
HCT VFR BLD AUTO: 39.3 % (ref 35–47)
HGB BLD-MCNC: 13.9 G/DL (ref 11.5–16)
HGB UR QL STRIP: NEGATIVE
HYALINE CASTS URNS QL MICRO: ABNORMAL /LPF (ref 0–5)
KETONES UR QL STRIP.AUTO: 40 MG/DL
LEUKOCYTE ESTERASE UR QL STRIP.AUTO: ABNORMAL
LYMPHOCYTES # BLD AUTO: 6 % (ref 12–49)
LYMPHOCYTES # BLD: 0.9 K/UL (ref 0.8–3.5)
MCH RBC QN AUTO: 29.1 PG (ref 26–34)
MCHC RBC AUTO-ENTMCNC: 35.4 G/DL (ref 30–36.5)
MCV RBC AUTO: 82.4 FL (ref 80–99)
MONOCYTES # BLD: 1.4 K/UL (ref 0–1)
MONOCYTES NFR BLD AUTO: 9 % (ref 5–13)
NEUTS SEG # BLD: 13 K/UL (ref 1.8–8)
NEUTS SEG NFR BLD AUTO: 85 % (ref 32–75)
NITRITE UR QL STRIP.AUTO: POSITIVE
PH UR STRIP: 5.5 [PH] (ref 5–8)
PLATELET # BLD AUTO: 359 K/UL (ref 150–400)
POTASSIUM SERPL-SCNC: 3.3 MMOL/L (ref 3.5–5.1)
PROT SERPL-MCNC: 8.9 G/DL (ref 6.4–8.2)
PROT UR STRIP-MCNC: ABNORMAL MG/DL
RBC # BLD AUTO: 4.77 M/UL (ref 3.8–5.2)
RBC #/AREA URNS HPF: ABNORMAL /HPF (ref 0–5)
SODIUM SERPL-SCNC: 130 MMOL/L (ref 136–145)
SP GR UR REFRACTOMETRY: 1.02 (ref 1–1.03)
TROPONIN I SERPL-MCNC: <0.04 NG/ML
UA: UC IF INDICATED,UAUC: ABNORMAL
UROBILINOGEN UR QL STRIP.AUTO: 0.2 EU/DL (ref 0.2–1)
WBC # BLD AUTO: 15.3 K/UL (ref 3.6–11)
WBC URNS QL MICRO: ABNORMAL /HPF (ref 0–4)

## 2017-07-10 PROCEDURE — 71275 CT ANGIOGRAPHY CHEST: CPT

## 2017-07-10 PROCEDURE — 87086 URINE CULTURE/COLONY COUNT: CPT | Performed by: EMERGENCY MEDICINE

## 2017-07-10 PROCEDURE — 65270000029 HC RM PRIVATE

## 2017-07-10 PROCEDURE — 87186 SC STD MICRODIL/AGAR DIL: CPT | Performed by: EMERGENCY MEDICINE

## 2017-07-10 PROCEDURE — 73562 X-RAY EXAM OF KNEE 3: CPT

## 2017-07-10 PROCEDURE — 96375 TX/PRO/DX INJ NEW DRUG ADDON: CPT

## 2017-07-10 PROCEDURE — 80053 COMPREHEN METABOLIC PANEL: CPT | Performed by: EMERGENCY MEDICINE

## 2017-07-10 PROCEDURE — 93005 ELECTROCARDIOGRAM TRACING: CPT

## 2017-07-10 PROCEDURE — 99285 EMERGENCY DEPT VISIT HI MDM: CPT

## 2017-07-10 PROCEDURE — 71020 XR CHEST PA LAT: CPT

## 2017-07-10 PROCEDURE — 74011250636 HC RX REV CODE- 250/636: Performed by: EMERGENCY MEDICINE

## 2017-07-10 PROCEDURE — 81001 URINALYSIS AUTO W/SCOPE: CPT | Performed by: EMERGENCY MEDICINE

## 2017-07-10 PROCEDURE — 82550 ASSAY OF CK (CPK): CPT | Performed by: EMERGENCY MEDICINE

## 2017-07-10 PROCEDURE — 85379 FIBRIN DEGRADATION QUANT: CPT | Performed by: EMERGENCY MEDICINE

## 2017-07-10 PROCEDURE — 96374 THER/PROPH/DIAG INJ IV PUSH: CPT

## 2017-07-10 PROCEDURE — 74011636320 HC RX REV CODE- 636/320: Performed by: EMERGENCY MEDICINE

## 2017-07-10 PROCEDURE — 74011000258 HC RX REV CODE- 258: Performed by: EMERGENCY MEDICINE

## 2017-07-10 PROCEDURE — 96361 HYDRATE IV INFUSION ADD-ON: CPT

## 2017-07-10 PROCEDURE — 85025 COMPLETE CBC W/AUTO DIFF WBC: CPT | Performed by: EMERGENCY MEDICINE

## 2017-07-10 PROCEDURE — 84484 ASSAY OF TROPONIN QUANT: CPT | Performed by: EMERGENCY MEDICINE

## 2017-07-10 PROCEDURE — 70450 CT HEAD/BRAIN W/O DYE: CPT

## 2017-07-10 PROCEDURE — 70480 CT ORBIT/EAR/FOSSA W/O DYE: CPT

## 2017-07-10 PROCEDURE — 87077 CULTURE AEROBIC IDENTIFY: CPT | Performed by: EMERGENCY MEDICINE

## 2017-07-10 PROCEDURE — 36415 COLL VENOUS BLD VENIPUNCTURE: CPT | Performed by: EMERGENCY MEDICINE

## 2017-07-10 PROCEDURE — P9612 CATHETERIZE FOR URINE SPEC: HCPCS

## 2017-07-10 RX ORDER — ASPIRIN 325 MG
325 TABLET ORAL DAILY
Status: DISCONTINUED | OUTPATIENT
Start: 2017-07-11 | End: 2017-07-15 | Stop reason: HOSPADM

## 2017-07-10 RX ORDER — MELATONIN
1000 DAILY
Status: DISCONTINUED | OUTPATIENT
Start: 2017-07-11 | End: 2017-07-15 | Stop reason: HOSPADM

## 2017-07-10 RX ORDER — SODIUM CHLORIDE 0.9 % (FLUSH) 0.9 %
10 SYRINGE (ML) INJECTION
Status: COMPLETED | OUTPATIENT
Start: 2017-07-10 | End: 2017-07-10

## 2017-07-10 RX ORDER — HYDROMORPHONE HYDROCHLORIDE 1 MG/ML
0.5 INJECTION, SOLUTION INTRAMUSCULAR; INTRAVENOUS; SUBCUTANEOUS
Status: COMPLETED | OUTPATIENT
Start: 2017-07-10 | End: 2017-07-10

## 2017-07-10 RX ORDER — AMLODIPINE BESYLATE 5 MG/1
10 TABLET ORAL DAILY
Status: DISCONTINUED | OUTPATIENT
Start: 2017-07-11 | End: 2017-07-15 | Stop reason: HOSPADM

## 2017-07-10 RX ORDER — NAPROXEN SODIUM 220 MG
220 TABLET ORAL
COMMUNITY
End: 2017-07-14

## 2017-07-10 RX ORDER — SODIUM CHLORIDE 9 MG/ML
150 INJECTION, SOLUTION INTRAVENOUS CONTINUOUS
Status: DISCONTINUED | OUTPATIENT
Start: 2017-07-10 | End: 2017-07-10

## 2017-07-10 RX ORDER — LEVOFLOXACIN 5 MG/ML
750 INJECTION, SOLUTION INTRAVENOUS EVERY 24 HOURS
Status: DISCONTINUED | OUTPATIENT
Start: 2017-07-11 | End: 2017-07-13

## 2017-07-10 RX ORDER — ONDANSETRON 2 MG/ML
4 INJECTION INTRAMUSCULAR; INTRAVENOUS
Status: COMPLETED | OUTPATIENT
Start: 2017-07-10 | End: 2017-07-10

## 2017-07-10 RX ORDER — HYDROCHLOROTHIAZIDE 25 MG/1
25 TABLET ORAL DAILY
Status: DISCONTINUED | OUTPATIENT
Start: 2017-07-11 | End: 2017-07-10

## 2017-07-10 RX ORDER — SODIUM CHLORIDE 9 MG/ML
100 INJECTION, SOLUTION INTRAVENOUS CONTINUOUS
Status: DISCONTINUED | OUTPATIENT
Start: 2017-07-10 | End: 2017-07-15 | Stop reason: HOSPADM

## 2017-07-10 RX ADMIN — IOPAMIDOL 60 ML: 755 INJECTION, SOLUTION INTRAVENOUS at 18:24

## 2017-07-10 RX ADMIN — HYDROMORPHONE HYDROCHLORIDE 0.5 MG: 1 INJECTION, SOLUTION INTRAMUSCULAR; INTRAVENOUS; SUBCUTANEOUS at 19:44

## 2017-07-10 RX ADMIN — SODIUM CHLORIDE 150 ML/HR: 900 INJECTION, SOLUTION INTRAVENOUS at 19:48

## 2017-07-10 RX ADMIN — ONDANSETRON 4 MG: 2 INJECTION INTRAMUSCULAR; INTRAVENOUS at 19:43

## 2017-07-10 RX ADMIN — Medication 10 ML: at 18:25

## 2017-07-10 RX ADMIN — SODIUM CHLORIDE 100 ML: 900 INJECTION, SOLUTION INTRAVENOUS at 18:25

## 2017-07-10 NOTE — IP AVS SNAPSHOT
[facility] [address] [place] [address] 
[phone] Patient: Omkar Carson MRN: [id number] You are allergic to the following Allergen Reactions Pcn (Penicillins) Swelling Recent Documentation Height Weight BMI OB Status Smoking Status 1.651 m 63.4 kg 23.26 kg/m2 Postmenopausal Former Smoker Emergency Contacts  (Rel.) Home Phone Work Phone Mobile Phone Lee Enriquez [phone] -- [phone] About your hospitalization You were admitted on:  July 10, 2017 You last received care in the:  [facility] You were discharged on:  July 14, 2017 Why you were hospitalized Your primary diagnosis was:  Not on File Your diagnoses also included:  Chest Pain Providers Seen During Your Hospitalizations Provider Role Specialty Primary office phone Yandel Collazo MD Attending Provider Emergency Medicine [phone] Parth Albert MD Attending Provider Internal Medicine [phone] Trudy Cody MD Attending Provider Internal Medicine [phone] Your Primary Care Physician (PCP) Primary Care Physician Office Phone Office Fax Iris Bar [phone] Follow-up Information Follow up With Details Comments Contact Info Brenda Lunch, DO In 1 week have taken pt off Valium, HCTZ and Cymbalta 2n2 fall, multiple and confusion. as well as dehydration. [address] [facility] [address] [place] [address] 
[phone] Appointments for Next 14 days 7/24/2017  1:45 PM  [address] [place] [address] Internal Medicine Current Discharge Medication List  
  
START taking these medications Dose & Instructions Dispensing Information Comments Morning Noon Evening Bedtime  
 levoFLOXacin 750 mg tablet Commonly known as:  Vera Bowen Your last dose was: Your next dose is: Dose:  750 mg Take 1 Tab by mouth Daily (before breakfast). Quantity:  3 Tab Refills:  0 CONTINUE these medications which have NOT CHANGED Dose & Instructions Dispensing Information Comments Morning Noon Evening Bedtime  
 amLODIPine 10 mg tablet Commonly known as:  Hunter Virginie Your last dose was: Your next dose is:    
   
   
 Dose:  10 mg Take 1 Tab by mouth daily. Quantity:  90 Tab Refills:  3  
     
   
   
   
  
 aspirin 325 mg tablet Commonly known as:  ASPIRIN Your last dose was: Your next dose is:    
   
   
 Dose:  325 mg Take 325 mg by mouth daily. Refills:  0 MOVE FREE [facility] [employer] 750 mg-100 mg- 1.65 mg-108 mg Tab Generic drug:  glucosam-chond-hyalu-CF borate Your last dose was: Your next dose is:    
   
   
 Dose:  1 Tab Take 1 Tab by mouth daily. Refills:  0  
     
   
   
   
  
 VITAMIN D3 1,000 unit tablet Generic drug:  cholecalciferol Your last dose was: Your next dose is:    
   
   
 Dose:  1000 Units Take 1,000 Units by mouth daily. Refills:  0 STOP taking these medications ALEVE 220 mg tablet Generic drug:  naproxen sodium  
   
  
 diazePAM 5 mg tablet Commonly known as:  VALIUM  
   
  
 DULoxetine 60 mg capsule Commonly known as:  CYMBALTA  
   
  
 hydroCHLOROthiazide 25 mg tablet Commonly known as:  HYDRODIURIL  
   
  
 potassium 99 mg tablet Where to Get Your Medications These medications were sent to [address] [place] [id number] Phone:  [phone]  
  levoFLOXacin 750 mg tablet Discharge Instructions Discharge Instructions PATIENT ID: Cal Ozuna MRN: [id number] YOB: 1950 DATE OF ADMISSION: 7/10/2017  2:54 PM   
DATE OF DISCHARGE: 7/14/2017 PRIMARY CARE PROVIDER: Evelina Johnson DO  
 
ATTENDING PHYSICIAN: Daniela Ireland MD 
DISCHARGING PROVIDER: Daniela Ireland MD   
To contact this individual call [phone] and ask the  to page. If unavailable ask to be transferred the Adult Hospitalist Department. DISCHARGE DIAGNOSES see dc note CONSULTATIONS: ED CONSULT TO SENIOR SERVICES CASE MANAGEMENT 
ED CONSULT TO SENIOR SERVICES PHYSICAL THERAPY IP CONSULT TO HOSPITALIST 
IP CONSULT TO CARDIOLOGY PROCEDURES/SURGERIES: * No surgery found * PENDING TEST RESULTS:  
At the time of discharge the following test results are still pending: na 
 
FOLLOW UP APPOINTMENTS:  
Follow-up Information Follow up With Details Comments Contact Info Evelina Johnson DO In 1 week have taken pt off Valium, HCTZ and Cymbalta 2n2 fall, multiple and confusion. as well as dehydration. [facility] [address] [place] [address] 
[phone] ADDITIONAL CARE RECOMMENDATIONS: na 
 
DIET: Cardiac Diet ACTIVITY: Activity as tolerated WOUND CARE: na 
 
EQUIPMENT needed: na 
 
 
  
x SNF/Inpatient Rehab/LTAC Independent/assisted living Hospice Other:  
 
 
 
Signed:  
Daniela Ireland MD 
7/14/2017 3:01 PM 
 
 
Discharge Orders None ACO Transitions of Care Introducing [employer] [address] offers a voluntary care coordination program to provide high quality service and care to [facility] fee-for-service beneficiaries. Celine Hartley was designed to help you enhance your health and well-being through the following services: ? Transitions of Care  support for individuals who are transitioning from one care setting to another (example: Hospital to home). ? Chronic and Complex Care Coordination  support for individuals and caregivers of those with serious or chronic illnesses or with more than one chronic (ongoing) condition and those who take a number of different medications. If you meet specific medical criteria, a [address] [place] [address] Rd may call you directly to coordinate your care with your primary care physician and your other care providers. For questions about the [facility] programs, please, contact your physicians office. For general questions or additional information about Accountable Care Organizations: 
Please visit www.medicare.gov/acos. html or call 1-800-MEDICARE (8-686.852.9076) TTY users should call 5-833.662.6443. [employer] Announcement We are excited to announce that we are making your provider's discharge notes available to you in StudyEdge. You will see these notes when they are completed and signed by the physician that discharged you from your recent hospital stay. If you have any questions or concerns about any information you see in [employer], please call the Health Information Department where you were seen or reach out to your Primary Care Provider for more information about your plan of care. Introducing [facility] & HEALTH SERVICES! Dear Severino Doing: 
Thank you for requesting a StudyEdge account.   Our records indicate that you already have an active [facility] account. You can access your account anytime at [email or web address] Did you know that you can access your hospital and ER discharge instructions at any time in [facility]? You can also review all of your test results from your hospital stay or ER visit. Additional Information If you have questions, please visit the Frequently Asked Questions section of the [facility] website at [email or web address] Remember, [facility] is NOT to be used for urgent needs. For medical emergencies, dial 911. Now available from your iPhone and Android! General Information Please provide this summary of care documentation to your next provider. Patient Signature:  ____________________________________________________________ Date:  ____________________________________________________________  
  
Claudene Born Provider Signature:  ____________________________________________________________ Date:  ____________________________________________________________

## 2017-07-10 NOTE — TELEPHONE ENCOUNTER
Pt arrived to office in wheelchair. Pt stating she was too weak to get up to standing scale. Nurse brought patient back to treatment room and patient began to complain of nausea along with CP. Pt not seen by NP. Pt taken to ED in wheelchair by nurse. Pt agreeable. Spoke with Lauren Pulido RN in the ED about the patient.

## 2017-07-10 NOTE — ED TRIAGE NOTES
Mid chest pain started about one hour ago, +sob, +nausea, +mid back pain, pt stated she was just laying there when it started, denies radiating pain, denies fever

## 2017-07-10 NOTE — ED PROVIDER NOTES
Patient is a 78 y.o. female presenting with chest pain. Chest Pain (Angina)    Associated symptoms include back pain. Pertinent negatives include no abdominal pain, no headaches, no shortness of breath and no vomiting. Pt has had multiple falls in the past few months the last one being July 4th;  states that she is falling an average of at least 4 times a week. She hit the left side of her face and sustained a superficial laceration to the left eye orbit area and both knees. Pt states that today she has had episodic chest pain, SOB and back pain. Denies fever, cold symptoms, headache, neck pain, visual changes, or focal weakness. Denies any difficulty breathing, difficulty swallowing or abominal pain. Denies any nausea, vomiting or diarrhea. Pt. Reports that she has not had any pain medications or food today prior to arrival. She is accompanied by her  who is her primary care giver. She also attends adult day care 40 hrs weekly. Her  states that she is a poor historian. Pt is oriented to person and place; could not remember the day or month. PMH, Social history and ROS are limited secondary to pt's altered mental status. Old charts reviewed. Past Medical History:   Diagnosis Date    CAD (coronary artery disease)     Hypertension     Hypokalaemia        Past Surgical History:   Procedure Laterality Date    HX APPENDECTOMY      HX OOPHORECTOMY Right     HX TONSILLECTOMY           Family History:   Problem Relation Age of Onset    Diabetes Mother        Social History     Social History    Marital status:      Spouse name: N/A    Number of children: N/A    Years of education: N/A     Occupational History    Not on file.      Social History Main Topics    Smoking status: Former Smoker     Packs/day: 0.50     Quit date: 10/19/2011    Smokeless tobacco: Never Used    Alcohol use 0.6 - 1.2 oz/week     1 - 2 Standard drinks or equivalent per week      Comment: one drink per week    Drug use: No    Sexual activity: Yes     Partners: Male     Other Topics Concern    Not on file     Social History Narrative         ALLERGIES: Pcn [penicillins]    Review of Systems   Constitutional: Positive for activity change and appetite change. HENT: Positive for facial swelling. Negative for sore throat and trouble swallowing. Eyes: Negative. Respiratory: Negative for shortness of breath. Cardiovascular: Positive for chest pain. Gastrointestinal: Negative for abdominal pain, diarrhea and vomiting. Genitourinary: Negative for dysuria. Musculoskeletal: Positive for back pain. Negative for neck pain. Skin: Negative for color change. Neurological: Negative for headaches. Psychiatric/Behavioral: Negative. Vitals:    07/10/17 1314 07/10/17 1630 07/10/17 1643   BP: 120/86 139/82    Pulse: (!) 108 99    Resp: 16 19    Temp: 98 °F (36.7 °C)     SpO2: 92% 97%    Weight:   68.5 kg (151 lb)   Height: 5' 5\" (1.651 m)              Physical Exam   Constitutional: She is oriented to person, place, and time. She appears well-nourished. Elderly white female; former smoker   HENT:   Head: Normocephalic. Mouth/Throat: Oropharynx is clear and moist.   Left periorbital swelling with ecchymosis; healing superficial abrasion > 3days old   Eyes: Conjunctivae and EOM are normal. Pupils are equal, round, and reactive to light. Right eye exhibits no discharge. Left eye exhibits no discharge. Neck: Normal range of motion. Neck supple. Cardiovascular: Normal rate and regular rhythm. Pulmonary/Chest: Effort normal and breath sounds normal. She exhibits tenderness. Right lateral rib area tenderness with palpation   Abdominal: Soft. Bowel sounds are normal. She exhibits no distension and no mass. There is no tenderness. There is no rebound and no guarding. Musculoskeletal: Normal range of motion. She exhibits tenderness. She exhibits no deformity.    Reports bilateral knee pain; Skin integrity is intact and ecchymotic. There is no obvious deformity. Good neurovascular sensation. No obvious joint effusion or joint instability. Pain increases with weight bearing; flexion and extension. Lymphadenopathy:     She has no cervical adenopathy. Neurological: She is alert and oriented to person, place, and time. Skin: Skin is warm and dry. Nursing note and vitals reviewed. MDM  ED Course       Procedures    Pt has been re-examined and reports some relief from pain medications given. Adult hospitalist (Dr. Tessa Norrisin and will admit. Discussed plan of care with Dr. Bella Dubose    Patient's results have been reviewed with her and her . Patient and/or family have verbally conveyed their understanding and agreement of the patient's signs, symptoms, diagnosis, treatment and prognosis and additionally agree to be admitted.  Jorge Torres NP

## 2017-07-10 NOTE — TELEPHONE ENCOUNTER
Called and pt is having chest pain. She refused to go to ED.   Having pt come in for an appt with [facility]

## 2017-07-10 NOTE — PROGRESS NOTES
SSED/CM consult received and appreciated. History significant for HTN, CAD, cerebral atrophy, weakness, and frequent falls. Patient presents to the ED s/p falls. Met w/patient and spouse - introduced to role of CM. Note patient is alert however talks w/ eyes closed. This writer noted previous CM entry 4/2017 and resources provided including adult  and private duty. LEE states he works 6 days a week and off on Mondays and patient attends [employer] [facility]  Tuesday- Saturdays. Spouse is very pleased w/ program since enrolled 5/17/17 however states \"she thinks she's too brave working well w/ PT at center then comes home thinking she can independent walk around if no one is with her. \" Provided list of private duty agencies - spouse states will need assistance on Saturdays and Sundays since will work average of 4 hours. Informed service would be private duty. Teena Sherman verbalizes will initiate a medicaid application at [address] [place] [address] / [facility]. Informed of 45 day process. Next Neuro appointment is 7/21/17 w/ . DME includes hospital bed, rollator, transport chair, quad cane, and gait belt. Teena Sherman states he paid for most items out of pocket - \" I didn't know any other way to obtain. \"    Provided private duty list and  senior connections resources. Spouse receptive of electronic referral be sent to [place]. Referral sent via all scripts. No additional transitional care needs verbalized at this time. Care Management Interventions  PCP Verified by CM: Yes  Last Visit to PCP: 05/02/17  Mode of Transport at Discharge:  Other (see comment)  Hospital Transport Time of Discharge: [facility]: No  Discharge Durable Medical Equipment: No  Physical Therapy Consult: Yes  Occupational Therapy Consult: No  Speech Therapy Consult: No  Current Support Network: Lives with Spouse, Own Home  Confirm Follow Up Transport:  (TBD)  Plan discussed with Pt/Family/Caregiver: Yes  Discharge Location  Discharge Placement:  (TBD)

## 2017-07-11 LAB
ANION GAP BLD CALC-SCNC: 13 MMOL/L (ref 5–15)
ATRIAL RATE: 101 BPM
ATRIAL RATE: 106 BPM
BUN SERPL-MCNC: 16 MG/DL (ref 6–20)
BUN/CREAT SERPL: 23 (ref 12–20)
CALCIUM SERPL-MCNC: 8.9 MG/DL (ref 8.5–10.1)
CALCULATED P AXIS, ECG09: 47 DEGREES
CALCULATED P AXIS, ECG09: 8 DEGREES
CALCULATED R AXIS, ECG10: -7 DEGREES
CALCULATED R AXIS, ECG10: 14 DEGREES
CALCULATED T AXIS, ECG11: 17 DEGREES
CALCULATED T AXIS, ECG11: 32 DEGREES
CHLORIDE SERPL-SCNC: 91 MMOL/L (ref 97–108)
CO2 SERPL-SCNC: 23 MMOL/L (ref 21–32)
CREAT SERPL-MCNC: 0.69 MG/DL (ref 0.55–1.02)
DIAGNOSIS, 93000: NORMAL
DIAGNOSIS, 93000: NORMAL
GLUCOSE SERPL-MCNC: 142 MG/DL (ref 65–100)
P-R INTERVAL, ECG05: 164 MS
P-R INTERVAL, ECG05: 168 MS
POTASSIUM SERPL-SCNC: 3 MMOL/L (ref 3.5–5.1)
Q-T INTERVAL, ECG07: 336 MS
Q-T INTERVAL, ECG07: 340 MS
QRS DURATION, ECG06: 62 MS
QRS DURATION, ECG06: 68 MS
QTC CALCULATION (BEZET), ECG08: 440 MS
QTC CALCULATION (BEZET), ECG08: 446 MS
SODIUM SERPL-SCNC: 127 MMOL/L (ref 136–145)
TROPONIN I SERPL-MCNC: <0.04 NG/ML
VENTRICULAR RATE, ECG03: 101 BPM
VENTRICULAR RATE, ECG03: 106 BPM

## 2017-07-11 PROCEDURE — 93306 TTE W/DOPPLER COMPLETE: CPT

## 2017-07-11 PROCEDURE — 36415 COLL VENOUS BLD VENIPUNCTURE: CPT | Performed by: INTERNAL MEDICINE

## 2017-07-11 PROCEDURE — 65660000000 HC RM CCU STEPDOWN

## 2017-07-11 PROCEDURE — 93970 EXTREMITY STUDY: CPT

## 2017-07-11 PROCEDURE — 97530 THERAPEUTIC ACTIVITIES: CPT

## 2017-07-11 PROCEDURE — 74011250637 HC RX REV CODE- 250/637: Performed by: INTERNAL MEDICINE

## 2017-07-11 PROCEDURE — 97161 PT EVAL LOW COMPLEX 20 MIN: CPT

## 2017-07-11 PROCEDURE — 74011250636 HC RX REV CODE- 250/636: Performed by: INTERNAL MEDICINE

## 2017-07-11 PROCEDURE — 97166 OT EVAL MOD COMPLEX 45 MIN: CPT

## 2017-07-11 PROCEDURE — 77010033678 HC OXYGEN DAILY

## 2017-07-11 PROCEDURE — 93005 ELECTROCARDIOGRAM TRACING: CPT

## 2017-07-11 PROCEDURE — 84484 ASSAY OF TROPONIN QUANT: CPT | Performed by: INTERNAL MEDICINE

## 2017-07-11 PROCEDURE — 80048 BASIC METABOLIC PNL TOTAL CA: CPT | Performed by: INTERNAL MEDICINE

## 2017-07-11 RX ORDER — HEPARIN SODIUM 5000 [USP'U]/ML
5000 INJECTION, SOLUTION INTRAVENOUS; SUBCUTANEOUS EVERY 8 HOURS
Status: DISCONTINUED | OUTPATIENT
Start: 2017-07-11 | End: 2017-07-15 | Stop reason: HOSPADM

## 2017-07-11 RX ORDER — ONDANSETRON 2 MG/ML
4 INJECTION INTRAMUSCULAR; INTRAVENOUS
Status: DISCONTINUED | OUTPATIENT
Start: 2017-07-11 | End: 2017-07-15 | Stop reason: HOSPADM

## 2017-07-11 RX ORDER — HEPARIN SODIUM 5000 [USP'U]/ML
5000 INJECTION, SOLUTION INTRAVENOUS; SUBCUTANEOUS EVERY 8 HOURS
Status: DISCONTINUED | OUTPATIENT
Start: 2017-07-11 | End: 2017-07-11

## 2017-07-11 RX ORDER — OXYCODONE AND ACETAMINOPHEN 5; 325 MG/1; MG/1
1 TABLET ORAL
Status: DISCONTINUED | OUTPATIENT
Start: 2017-07-11 | End: 2017-07-13

## 2017-07-11 RX ORDER — POTASSIUM CHLORIDE 7.45 MG/ML
10 INJECTION INTRAVENOUS
Status: COMPLETED | OUTPATIENT
Start: 2017-07-11 | End: 2017-07-12

## 2017-07-11 RX ADMIN — VITAMIN D, TAB 1000IU (100/BT) 1000 UNITS: 25 TAB at 09:56

## 2017-07-11 RX ADMIN — AMLODIPINE BESYLATE 10 MG: 5 TABLET ORAL at 09:56

## 2017-07-11 RX ADMIN — ASPIRIN 325 MG: 325 TABLET ORAL at 09:56

## 2017-07-11 RX ADMIN — HEPARIN SODIUM 5000 UNITS: 5000 INJECTION, SOLUTION INTRAVENOUS; SUBCUTANEOUS at 17:38

## 2017-07-11 RX ADMIN — HEPARIN SODIUM 5000 UNITS: 5000 INJECTION, SOLUTION INTRAVENOUS; SUBCUTANEOUS at 09:55

## 2017-07-11 RX ADMIN — POTASSIUM CHLORIDE 10 MEQ: 10 INJECTION, SOLUTION INTRAVENOUS at 13:41

## 2017-07-11 RX ADMIN — SODIUM CHLORIDE 100 ML/HR: 900 INJECTION, SOLUTION INTRAVENOUS at 00:01

## 2017-07-11 RX ADMIN — LEVOFLOXACIN 750 MG: 5 INJECTION, SOLUTION INTRAVENOUS at 01:37

## 2017-07-11 RX ADMIN — POTASSIUM CHLORIDE 10 MEQ: 10 INJECTION, SOLUTION INTRAVENOUS at 09:56

## 2017-07-11 RX ADMIN — OXYCODONE HYDROCHLORIDE AND ACETAMINOPHEN 1 TABLET: 5; 325 TABLET ORAL at 03:20

## 2017-07-11 RX ADMIN — ONDANSETRON 4 MG: 2 INJECTION INTRAMUSCULAR; INTRAVENOUS at 09:55

## 2017-07-11 RX ADMIN — POTASSIUM CHLORIDE 10 MEQ: 10 INJECTION, SOLUTION INTRAVENOUS at 11:13

## 2017-07-11 RX ADMIN — OXYCODONE HYDROCHLORIDE AND ACETAMINOPHEN 1 TABLET: 5; 325 TABLET ORAL at 13:41

## 2017-07-11 NOTE — ROUTINE PROCESS
Bedside shift change report given to Cristy Alcaraz (oncoming nurse) by Misbah Interiano (offgoing nurse). Report included the following information SBAR, Kardex, Recent Results and Cardiac Rhythm NSR.

## 2017-07-11 NOTE — PROGRESS NOTES
Problem: Mobility Impaired (Adult and Pediatric)  Goal: *Acute Goals and Plan of Care (Insert Text)  Physical Therapy Goals  Initiated 7/11/2017  1. Patient will move from supine to sit and sit to supine in bed with minimal assistance/contact guard assist within 7 day(s). 2. Patient will transfer from bed to chair and chair to bed with minimal assistance/contact guard assist using the least restrictive device within 7 day(s). 3. Patient will perform sit to stand with minimal assistance/contact guard assist within 7 day(s). 4. Patient will ambulate with minimal assistance/contact guard assist for 25 feet with the least restrictive device within 7 day(s). 5. Patient will ascend/descend 6 stairs with 2 handrail(s) with moderate assistance within 7 day(s). PHYSICAL THERAPY EVALUATION  Patient: Rafael Blackman (30 y.o. female)  Date: 7/11/2017  Primary Diagnosis: Chest pain        Precautions: Fall risk         ASSESSMENT :  Chart reviewed. Patient cleared to be seen by nursing staff. Pt is a poor historian and difficult to redirect. Pt reporting that she has fallen 72 times in 60 days. Pt's  should be back this afternoon to assist filling in PLOF and home set up. Pt requiring assist x 2 and additional time to come EOB today. Good sitting balance with feet on the floor. Pt reporting that her right leg suddenly michael and that's why she falls. Not seen upon evaluation. Initially requiring max assist x 2 to stand, however upon second attempt, patient able to come to standing with min assist x 2. Pt unable to take steps, however with facilitated weight shifting and while patient is distracted, able to side step x 2-3 steps with min assist. Pt's functional status is fluctuating upon evaluation and suspect that a lot of this is secondary to an increased fear of falling. Patient reassured of safety, but she is not safe to leave in a bedside chair at this time without family present. Bed alarm activated.  Per chart,  is primary caregiver and assists with mobility. For safe discharge home and back to day care, patient will need to demonstrate a bed to chair transfer with one assist.      Patient will benefit from skilled intervention to address the above impairments. Patients rehabilitation potential is considered to be Good  Factors which may influence rehabilitation potential include:   [ ]         None noted  [ ]         Mental ability/status  [ ]         Medical condition  [ ]         Home/family situation and support systems  [ ]         Safety awareness  [ ]         Pain tolerance/management  [ ]         Other:        PLAN :  Recommendations and Planned Interventions:  [ ]           Bed Mobility Training             [ ]    Neuromuscular Re-Education  [ ]           Transfer Training                   [ ]    Orthotic/Prosthetic Training  [ ]           Gait Training                         [ ]    Modalities  [ ]           Therapeutic Exercises           [ ]    Edema Management/Control  [ ]           Therapeutic Activities            [ ]    Patient and Family Training/Education  [ ]           Other (comment):     Frequency/Duration: Patient will be followed by physical therapy  5 times a week to address goals. Discharge Recommendations: [facility] vs home with family with 24 hour care  Further Equipment Recommendations for Discharge: none       SUBJECTIVE:   Patient stated I have fallen 72 times. My  keeps a journal because no one believes how often I fall.       OBJECTIVE DATA SUMMARY:   HISTORY:    Past Medical History:   Diagnosis Date    CAD (coronary artery disease)      Hypertension      Hypokalaemia       Past Surgical History:   Procedure Laterality Date    HX APPENDECTOMY        HX OOPHORECTOMY Right      HX TONSILLECTOMY         Prior Level of Function/Home Situation: Pt lives with  who works full time.  6 days a week, patient goes to day care and works with PT there. Significant fall history. Bruising and facial contusion on the left from previous fall. Personal factors and/or comorbidities impacting plan of care:      Home Situation  Home Environment: Private residence  # Steps to Enter: 6  Rails to Enter: Yes  Hand Rails : Bilateral  Wheelchair Ramp: No  One/Two Story Residence: Split level  # of Interior Steps: 11  Interior Rails: Both  Lift Chair Available: No  Living Alone: No  Support Systems: Family member(s), Child(rj)  Patient Expects to be Discharged to[de-identified] Private residence  Current DME Used/Available at Home: Wheelchair, Walker, rolling, Cane, straight     EXAMINATION/PRESENTATION/DECISION MAKING:   Critical Behavior:  Neurologic State: Drowsy, Eyes open spontaneously, Restless  Orientation Level: Oriented X4  Cognition: Follows commands, Decreased command following, Appropriate for age attention/concentration, Appropriate safety awareness     Hearing: Auditory  Auditory Impairment: None  Strength:    Strength: Generally decreased, functional                    Tone & Sensation:   Tone: Normal              Sensation: Intact               Coordination:  Coordination: Generally decreased, functional  Functional Mobility:  Bed Mobility:     Supine to Sit: Moderate assistance;Assist x2  Sit to Supine: Maximum assistance;Assist x2     Transfers:  Sit to Stand: Moderate assistance;Assist x2; Additional time  Stand to Sit: Moderate assistance;Assist x2                       Balance:   Sitting: Intact  Standing: Impaired  Standing - Static: Poor  Standing - Dynamic : Poor     Functional Measure:  Unable to assess at this time. Pain:  Pain Scale 1: Numeric (0 - 10)  Pain Intensity 1: 0        Activity Tolerance:   Vitals stable with positional change. /72 in standing  Please refer to the flowsheet for vital signs taken during this treatment.   After treatment:   [ ]         Patient left in no apparent distress sitting up in chair  [X]         Patient left in no apparent distress in bed  [X]         Call bell left within reach  [X]         Nursing notified  [ ]         Caregiver present  [X]         Bed alarm activated      COMMUNICATION/EDUCATION:   The patients plan of care was discussed with: Registered Nurse.  [X]         Fall prevention education was provided and the patient/caregiver indicated understanding. [X]         Patient/family have participated as able in goal setting and plan of care. [X]         Patient/family agree to work toward stated goals and plan of care. [ ]         Patient understands intent and goals of therapy, but is neutral about his/her participation. [ ]         Patient is unable to participate in goal setting and plan of care.      Thank you for this referral.  Rocio Cardoso PT, DPT   Time Calculation: 20 mins

## 2017-07-11 NOTE — PROCEDURES
[facility]  *** FINAL REPORT ***    Name: Hanny Stout  MRN: [id number]    Inpatient  : 1950  HIS Order #: [id number]  [id number] [facility] Visit #: [id number]  Date: 2017    TYPE OF TEST: Peripheral Venous Testing    REASON FOR TEST  Pain in limb, Limb swelling    Right Leg:-  Deep venous thrombosis:           No  Superficial venous thrombosis:    No  Deep venous insufficiency:        Not examined  Superficial venous insufficiency: Not examined    Left Leg:-  Deep venous thrombosis:           No  Superficial venous thrombosis:    No  Deep venous insufficiency:        Not examined  Superficial venous insufficiency: Not examined      INTERPRETATION/FINDINGS  PROCEDURE:  Color duplex ultrasound imaging of lower extremity veins. FINDINGS:       Right: The common femoral, deep femoral, femoral, popliteal,  posterior tibial, peroneal, and great saphenous are patent and without   evidence of thrombus;  each is fully compressible and there is no  narrowing of the flow channel on color Doppler imaging. There is  limited visualization of the peroneal veins. Phasic flow is observed  in the common femoral vein. Left:   The common femoral, deep femoral, femoral, popliteal,  posterior tibial, peroneal, and great saphenous are patent and without   evidence of thrombus;  each is fully compressible and there is no  narrowing of the flow channel on color Doppler imaging. Phasic flow  is observed in the common femoral vein. IMPRESSION:  No evidence of right or left lower extremity vein  thrombosis where visualized. ADDITIONAL COMMENTS    I have personally reviewed the data relevant to the interpretation of  this  study.     TECHNOLOGIST: Laura Perez RVT  Signed: 2017 09:11 AM    PHYSICIAN: Fer Zaidi MD  Signed: 2017 09:17 AM

## 2017-07-11 NOTE — ED NOTES
Bedside shift change report given to [address] (oncoming nurse) by Seamus Silverman RN (offgoing nurse). Report included the following information SBAR, ED Summary, MAR and Recent Results.

## 2017-07-11 NOTE — PROGRESS NOTES
Problem: Self Care Deficits Care Plan (Adult)  Goal: *Acute Goals and Plan of Care (Insert Text)  Occupational Therapy Goals  Initiated 7/11/2017  1. Patient will perform upper body bathing with supervision/set-up within 7 day(s). 2. Patient will perform lower body dressing with maximal assistance within 7 day(s). 3. Patient will perform grooming sitting EOB with supervision/set-up within 7 day(s). 4. Patient will perform toilet transfers with moderate assistance within 7 day(s). 5. Patient will perform all aspects of toileting with moderate assistance within 7 day(s). 6. Patient will participate in upper extremity therapeutic exercise/activities with supervision/set-up for 10 minutes within 7 day(s). 7. Patient will utilize energy conservation techniques during functional activities with visual cues within 7 day(s). OCCUPATIONAL THERAPY EVALUATION  Patient: Lady Juarez (66 y.o. female)  Date: 7/11/2017  Primary Diagnosis: Chest pain        Precautions:   Fall      ASSESSMENT :  Based on the objective data described below, the patient presents with anxiety regarding movement, decreased attention to task, self limiting behaviors vs learned dependency, and a history significant for 72 falls in 60 days. Apparently, pt attends [facility] during the day,  has been providing significant amounts of physical assist in the home. Pt is not safe to return home at this time. recommending discharge to rehab once medically stable to maximize safety and independence with ADL routine. **of note, pt has compression fractures at T8 and T10 shown on CT of chest. It does not appear these fractures have been addressed, however RN aware of them.  asking OT about the back fractures and wondering when MD will address them. Patient will benefit from skilled intervention to address the above impairments.   Patients rehabilitation potential is considered to be Fair  Factors which may influence rehabilitation potential include:   [ ]             None noted  [ ]             Mental ability/status  [X]             Medical condition  [ ]             Home/family situation and support systems  [X]             Safety awareness  [ ]             Pain tolerance/management  [ ]             Other:        PLAN :  Recommendations and Planned Interventions:  [ ]               Self Care Training                  [ ]        Therapeutic Activities  [ ]               Functional Mobility Training    [ ]        Cognitive Retraining  [ ]               Therapeutic Exercises           [ ]        Endurance Activities  [ ]               Balance Training                   [ ]        Neuromuscular Re-Education  [ ]               Visual/Perceptual Training     [ ]   Home Safety Training  [ ]               Patient Education                 [ ]        Family Training/Education  [ ]               Other (comment):     Frequency/Duration: Patient will be followed by occupational therapy 5 times a week to address goals. Discharge Recommendations: Rehab  Further Equipment Recommendations for Discharge: TBD       SUBJECTIVE:   Patient stated Navarro Harm fall all the time.       OBJECTIVE DATA SUMMARY:   HISTORY:   Past Medical History:   Diagnosis Date    CAD (coronary artery disease)      Hypertension      Hypokalaemia       Past Surgical History:   Procedure Laterality Date    HX APPENDECTOMY        HX OOPHORECTOMY Right      HX TONSILLECTOMY            Prior Level of Function/Home Situation: pt has assist for all self care, states she was moving about at MOD I level with rolling walker  Expanded or extensive additional review of patient history:      Home Situation  Home Environment: Private residence  # Steps to Enter: 6  Rails to Enter: Yes  Hand Rails : Bilateral  Wheelchair Ramp: No  One/Two Story Residence: Split level  # of Interior Steps: 11  Interior Rails: Both  Lift Chair Available: No  Living Alone: No  Support Systems: Family member(s), Child(rj)  Patient Expects to be Discharged to[de-identified] Private residence  Current DME Used/Available at Home: Wheelchair, Walker, rolling, Cane, straight  [ ]  Right hand dominant   [ ]  Left hand dominant     EXAMINATION OF PERFORMANCE DEFICITS:  Cognitive/Behavioral Status:  Neurologic State: Alert  Orientation Level: Oriented X4  Cognition: Decreased attention/concentration;Decreased command following  Perception: Appears intact  Perseveration: No perseveration noted  Safety/Judgement: Awareness of environment; Fall prevention;Home safety     Skin: Observed skin intact     Edema: None observed  =     Hearing: Auditory  Auditory Impairment: None                                   Strength:     Strength: Generally decreased, functional                 Coordination:  Coordination: Generally decreased, functional  Fine Motor Skills-Upper: Left Intact; Right Intact    Gross Motor Skills-Upper: Left Intact; Right Intact     Tone & Sensation:     Tone: Normal  Sensation: Intact                       Balance:  Sitting: Impaired  Sitting - Static: Good (unsupported)  Sitting - Dynamic: Fair (occasional)  Standing:  (not tested pt declined)  Standing - Static: Poor  Standing - Dynamic : Poor     Functional Mobility and Transfers for ADLs:  Bed Mobility:  Supine to Sit: Moderate assistance;Assist x1;Additional time  Sit to Supine: Moderate assistance; Additional time;Assist x1     Transfers:  Sit to Stand: Moderate assistance;Assist x2; Additional time  Stand to Sit: Moderate assistance;Assist x2     ADL Assessment:  Feeding: Supervision     Oral Facial Hygiene/Grooming: Supervision     Bathing: Moderate assistance     Upper Body Dressing: Supervision     Lower Body Dressing: Total assistance     Toileting: Total assistance                 ADL Intervention and task modifications:     Cognitive Retraining  Safety/Judgement: Awareness of environment; Fall prevention;Home safety  Functional Measure:  Barthel Index: Bathin  Bladder: 0  Bowels: 0  Groomin  Dressin  Feedin  Mobility: 0  Stairs: 0  Toilet Use: 5  Transfer (Bed to Chair and Back): 5  Total: 20         Barthel and G-code impairment scale:  Percentage of impairment CH  0% CI  1-19% CJ  20-39% CK  40-59% CL  60-79% CM  80-99% CN  100%   Barthel Score 0-100 100 99-80 79-60 59-40 20-39 1-19    0   Barthel Score 0-20 20 17-19 13-16 9-12 5-8 1-4 0      The Barthel ADL Index: Guidelines  1. The index should be used as a record of what a patient does, not as a record of what a patient could do. 2. The main aim is to establish degree of independence from any help, physical or verbal, however minor and for whatever reason. 3. The need for supervision renders the patient not independent. 4. A patient's performance should be established using the best available evidence. Asking the patient, friends/relatives and nurses are the usual sources, but direct observation and common sense are also important. However direct testing is not needed. 5. Usually the patient's performance over the preceding 24-48 hours is important, but occasionally longer periods will be relevant. 6. Middle categories imply that the patient supplies over 50 per cent of the effort. 7. Use of aids to be independent is allowed. Genesis Espinoza., Barthel, D.W. (3614). Functional evaluation: the Barthel Index. [address] [facility] (14)2. Evaristolucas Jha jazmín Yomi, AYSHA.J.M.TAMAR, Gigi Vernon., Verenice Reyes.[facility], [address] [place] [address] (). Measuring the change indisability after inpatient rehabilitation; comparison of the responsiveness of the Barthel Index and Functional Whitman Measure. Journal of Neurology, Neurosurgery, and Psychiatry, 66(4), 550-560. [place], [place]ALYSHA, WILLIAN ChanJ.GORGE, & Elizabeth Tapia MAlfonsoA. (2004.) Assessment of post-stroke quality of life in cost-effectiveness studies: The usefulness of the Barthel Index and the EuroQoL-5D. Quality of Life Research, 13, 147-42            G codes:   In compliance with CMSs Claims Based Outcome Reporting, the following G-code set was chosen for this patient based on their primary functional limitation being treated: The outcome measure chosen to determine the severity of the functional limitation was the Barthel index with a score of 20/100 which was correlated with the impairment scale. · Self Care:               - CURRENT STATUS:    CM - 80%-99% impaired, limited or restricted               - GOAL STATUS:           CL - 60%-79% impaired, limited or restricted               - D/C STATUS:                       ---------------To be determined---------------      Occupational Therapy Evaluation Charge Determination   History Examination Decision-Making   MEDIUM Complexity : Expanded review of history including physical, cognitive and psychosocial  history  MEDIUM Complexity : 3-5 performance deficits relating to physical, cognitive , or psychosocial skils that result in activity limitations and / or participation restrictions MEDIUM Complexity : Patient may present with comorbidities that affect occupational performnce. Miniml to moderate modification of tasks or assistance (eg, physical or verbal ) with assesment(s) is necessary to enable patient to complete evaluation       Based on the above components, the patient evaluation is determined to be of the following complexity level: MEDIUM  Pain:  Pain Scale 1: Numeric (0 - 10)  Pain Intensity 1: 0  Pain Location 1: Rib cage  Pain Orientation 1: Right  Pain Description 1: Aching  Pain Intervention(s) 1: Medication (see MAR)  Activity Tolerance:   Pt tolerated all activity without s/s of distress. Please refer to the flowsheet for vital signs taken during this treatment.   After treatment:   [ ] Patient left in no apparent distress sitting up in chair  [X] Patient left in no apparent distress in bed  [X] Call bell left within reach  [X] Nursing notified  [X] Caregiver present  [ ] Bed alarm activated      COMMUNICATION/EDUCATION:   The patients plan of care was discussed with: Physical Therapist and Registered Nurse.  [X] Home safety education was provided and the patient/caregiver indicated understanding. [X] Patient/family have participated as able in goal setting and plan of care. [X] Patient/family agree to work toward stated goals and plan of care. [ ] Patient understands intent and goals of therapy, but is neutral about his/her participation. [ ] Patient is unable to participate in goal setting and plan of care. This patients plan of care is appropriate for delegation to [facility].      Thank you for this referral.  Pawan Buck, OTR/L     Time Calculation: 48 mins

## 2017-07-11 NOTE — H&P
[address] [facility] [address]   HISTORY AND PHYSICAL       Name:  Maral Maciel   MR#:  [id number]   :  1950   Account #:  [de-identified]        Date of Adm:  07/10/2017       CHIEF COMPLAINT:  Chest pain since this a.m. History of falls with   upper to mid back pain. HISTORY OF PRESENT ILLNESS:  This patient is a 71-year-  old female with a history of neurodegenerative disorder, currently   getting a workup. As per the , the patient does not have too   much strength in the upper and lower extremities and very limited in   doing ADLs for herself. On the , the patient was unable to   stabilize herself and fell from the stairs, and ended up hitting the left   side of the head and the upper back against the edge of the stairs. Since then, she has been complaining of significant mid and upper   back pain. During the daytime, the patient stays at the adult day care   and the  picks her up in the evenings and does most of her   routine stuff. Today, the  noted that the patient had been   complaining of chest pain which is sometimes in the center of the chest   and sometimes was pointing below the breast.  The patient seemed   confused during the interview process. However, currently denies   having any chest pain at this time. She does not complain of any   shortness of breath. Denies any nausea, any vomiting. Denies   complaints of any numbness or tingling or weakness of the upper and   lower extremities. PAST MEDICAL HISTORY:  History of hypertension. History of   coronary artery disease. ALLERGIES:  PENICILLIN. MEDICATIONS   The patient is currently taking    1. Cymbalta 50 mg p.o. daily. 2.  Hydrochlorothiazide 25 mg p.o. daily. 3.  Amlodipine 10 mg p.o. daily. 4.  Diazepam 5 mg p.o. q.12 p.r.n.   5.  She is on vitamin D supplements. 6.  She also takes potassium tablets.    7.  Also is on aspirin 325 mg p.o. daily. FAMILY HISTORY:  As above. SOCIAL HISTORY:  The patient is very limited in her activities of daily   living, as stated above, the adult day care. REVIEW OF SYSTEMS:  Limited. The patient seemed confused at   this time. PHYSICAL EXAMINATION:  The patient was alert, awake, oriented   x2. During the interview process, was coming in and out of her   sensorium. During the interview process, she was getting   intermittently confused. HEAD, EARS, EYES, NOSE, MOUTH, THROAT:  Pupils are equal,   reactive to light. Left-sided forehead over the temporal region, small   superficial abrasion was noted. Head was otherwise atraumatic. NECK:  No cervical spine tenderness was noted. LUNGS:  Chest wall, no tenderness was noted. Lungs are otherwise   clear. CARDIOVASCULAR:  S1, S2 audible. No S3, S4.    ABDOMEN:  Soft, nontender. MUSCULOSKELETAL:  Generalized muscle wasting was noted and   Guttering  of the interossei and   CENTRAL NERVOUS SYSTEM:  Cranial nerves 2 through 12 are   intact. Strength is 4/5 was noted bilaterally all 4 extremities. Reflexes   Bilaterally  downgoing. Reflexes were normal.     LABS:  Urinalysis, urine showed urinary tract infection. D-dimer was   elevated at 1.5. CK-MB was normal.  Troponin was also within normal   limits. Under electrolytes, sodium was found to be 130, potassium of   3.3, chloride of 93, glucose of 120, BUN of 20, creatinine of 18. GFR   was within normal limits. Calcium of 9.6. ALT, AST, and alkaline   phosphatase were within normal limits. Protein was 8.9, albumin  was 3.6,   and globulin of 5.3. On the imaging, the patient's CT of chest was negative for any   pulmonary embolism. CT head and face was essentially negative for   any fracture or intracranial bleed. ASSESSMENT AND PLAN   1. Chest pain. Will admit to telemetry. Serial cardiac enzymes will be   done.   We will continue with the aspirin for now.  Cardiology consult has   been placed. Elevated D-dimers. Will do bilateral Doppler ultrasounds   to rule out blood clots in the lower extremities. 2.  T8-T10 compression fracture. We will observe for now. 3.  Hyponatremia. Gently hydrate with normal saline. 4.  Urinary tract infection. We will start the patient on Levaquin. 5.  History of falls. We will get the patient Physical Therapy.          MD ROSITA Melendez / NB   D:  07/10/2017   23:45   T:  07/11/2017   10:37   Job #:  [id number]

## 2017-07-11 NOTE — PROGRESS NOTES
Assessemnt/Plan:   Daily Progress Note: 2017    Admit date: 7/10/2017  2:54 PM    Hospitalist Progress Note   Franco Vazquez MD\[facility]\"" [phone]; Call physician on-call through the  7pm-7am          PCP: Bruna Betancourt Procedure:   * No surgery found *  Consultants this Hospitalization:   ED CONSULT TO [facility] [address]  ED CONSULT  S [place] TO HOSPITALIST  IP CONSULT TO [facility] Procedure:   * No surgery found *           NAME:  Escobar Chinchilla      :  1961  MRN:  [id number]      Admission Summary:     Multiple falls, cp on admission. Chief Complaint for 2017 ; and pertinent  Interval history / Subjective:         2017 concern and/or interventions/findings; additions to assess/plans below. · Personal review of CXr suggest nl study, no aucte findings  · Personal review or admit ECG: sinus tachy, no acute findings, otherwise  · Lab reviewed bedlow,   · Knee xrays reviewed no fx . Assessment & Plan: Additionally addressed by this writer at this time:              · Metabolic encephalopathy /Confusion, ? Etiology. For further eval CT neg ,? assoc with medicine effect and findings below. · Multiple fall, bruises noted tim rt inner thigh,  No fx on knee xray,   Multi-body MS discomforts. · HTN, cont on home amlodipine  But dc home diuretic. · Hyponatremia, suspect total body sodium down, 2n2 dehydration, f/u lab in am   · Dehydration , replenish isotonically and f/u lab  · UTI, cult pending. · Anxiety of record for which pt takes valium, not clear that this is a resonable option given falls and confusion. Will cont to hold home med. · T spine compression fx ? Age, T  and T 10      Code status: full   DVT prophylaxis: SCD's ordered pt not ruma, will start Heparin.      Care Plan discussed with: Patient/Family, Nurse and   Disposition: Home w/Family and TBD     Subjective:     ROS   Not reliable 2n2 pt on going confusion,                 Could NOT obtain due to:  confusion      Objective:       PHYSICAL EXAM:  Vital signs below          Constitutional:   + acute distress  ,   +  Alert;         +  Conversant;      Eyes: anicteric sclerae, moist conjunctivae;    no  lid-lag;    + PERRLA;    Ears, nose,mouth,and throat:   Normal external ears ; oropharynx clear with  Dry  mucous membranes; no posterior oral lesions; and  trach is   mid line; Respiratory:    Trachea midline;  CTA    with normal   respiratory effort; and no    intercostal retractions;    Cardiovascular: RRR, no MRGs;    Gastrointestinal: Soft, non-tender  ; no   masses or HSM; no   guarding; no   rebound;         Musculoskelatal:  extreme ties are supple;  no   peripheral edema;  neck with   FROM; bruises tim inner thigh rt leg  Skin:  Temperature nl  ;  turgor is nl   ; the texture nl   ;  no   rash;     Neurologic: CN 2- 12   intact;  no   focal motor weakness;   ;    Psychiatric:  Appropriate   affect; alert   ; and not oriented to , place  , and time ;    Heme/Lymph/Immune:  Cervical adenopathy not   present; axillary adenopathy not   present;       VITALS:   Last 24hrs VS reviewed since prior progress note.  Most recent are:  Patient Vitals for the past 24 hrs:   Temp Pulse Resp BP SpO2   07/11/17 0700 98.7 °F (37.1 °C) 98 19 124/61 98 %   07/11/17 0300 98.6 °F (37 °C) 100 20 129/62 97 %   07/11/17 0030 98.5 °F (36.9 °C) (!) 102 18 149/81 93 %   07/11/17 0014 - - - - 91 %   07/11/17 0000 98.5 °F (36.9 °C) (!) 106 20 139/68 93 %   07/10/17 2300 - (!) 104 19 142/71 91 %   07/10/17 2200 - 100 17 139/74 92 %   07/10/17 2100 - (!) 103 16 126/81 92 %   07/10/17 2000 98.2 °F (36.8 °C) (!) 104 17 124/74 93 %   07/10/17 1930 - (!) 102 23 149/78 95 %   07/10/17 1900 - (!) 102 23 139/84 95 %   07/10/17 1730 - 98 20 139/77 97 %   07/10/17 1700 - 98 23 153/80 97 %   07/10/17 1630 - 99 19 139/82 97 % 07/10/17 1314 98 °F (36.7 °C) (!) 108 16 120/86 92 %       Intake/Output Summary (Last 24 hours) at 07/11/17 0699  Last data filed at 07/10/17 2100   Gross per 24 hour   Intake                0 ml   Output              300 ml   Net             -300 ml                ______________________________________________________________________  Medicines:    Current Facility-Administered Medications:     oxyCODONE-acetaminophen (PERCOCET) 5-325 mg per tablet 1 Tab, 1 Tab, Oral, Q6H PRN, Venora Kawasaki, MD, 1 Tab at 07/11/17 0320    ondansetron (ZOFRAN) injection 4 mg, 4 mg, IntraVENous, Q6H PRN, Venora Kawasaki, MD    amLODIPine (NORVASC) tablet 10 mg, 10 mg, Oral, DAILY, Alin Busch MD    cholecalciferol (VITAMIN D3) tablet 1,000 Units, 1,000 Units, Oral, DAILY, Alin Busch MD    aspirin (ASPIRIN) tablet 325 mg, 325 mg, Oral, DAILY, Alin Busch MD    levoFLOXacin (LEVAQUIN) 750 mg in D5W IVPB, 750 mg, IntraVENous, Q24H, Alin Busch MD, Last Rate: 100 mL/hr at 07/11/17 0137, 750 mg at 07/11/17 0137    0.9% sodium chloride infusion, 100 mL/hr, IntraVENous, CONTINUOUS, Alin Busch MD, Last Rate: 100 mL/hr at 07/11/17 0001, 100 mL/hr at 07/11/17 0001  Procedures: see electronic medical records for all procedures/Xrays and details which were not copied into this note but were reviewed prior to creation of Plan. LABS:  Recent Labs      07/10/17   1328   WBC  15.3*   HGB  13.9   HCT  39.3   PLT  359     Recent Labs      07/11/17   0314  07/10/17   1328   NA  127*  130*   K  3.0*  3.3*   CL  91*  93*   CO2  23  23   BUN  16  20   CREA  0.69  0.80   GLU  142*  120*   CA  8.9  9.6     Recent Labs      07/10/17   1328   SGOT  19   ALT  18   AP  109   TBILI  0.7   TP  8.9*   ALB  3.6   GLOB  5.3*     No results for input(s): INR, PTP, APTT in the last 72 hours. No lab exists for component: INREXT   No results for input(s): FE, TIBC, PSAT, FERR in the last 72 hours.    Lab Results   Component Value Date/Time    Folate 4.4 09/17/2015 11:06 AM      No results for input(s): PH, PCO2, PO2 in the last 72 hours. No results for input(s): PHI, PO2I, PCO2I in the last 72 hours. Recent Labs      07/11/17   0314  07/10/17   1328   CPK   --   32   CKNDX   --   Cannot be calulated   TROIQ  <0.04  <0.04     Lab Results   Component Value Date/Time    Cholesterol, total 210 04/04/2017 10:49 AM    HDL Cholesterol 77 04/04/2017 10:49 AM    LDL, calculated 117 04/04/2017 10:49 AM    Triglyceride 79 04/04/2017 10:49 AM    CHOL/HDL Ratio 2.6 05/10/2010 09:42 AM     No results found for: [facility]  Lab Results   Component Value Date/Time    Color YELLOW/STRAW 07/10/2017 09:28 PM    Appearance CLEAR 07/10/2017 09:28 PM    Specific gravity 1.025 07/10/2017 09:28 PM    Specific gravity 1.026 04/22/2017 04:06 PM    pH (UA) 5.5 07/10/2017 09:28 PM    Protein TRACE 07/10/2017 09:28 PM    Glucose NEGATIVE  07/10/2017 09:28 PM    Ketone 40 07/10/2017 09:28 PM    Bilirubin NEGATIVE  07/10/2017 09:28 PM    Urobilinogen 0.2 07/10/2017 09:28 PM    Nitrites POSITIVE 07/10/2017 09:28 PM    Leukocyte Esterase TRACE 07/10/2017 09:28 PM    Epithelial cells FEW 07/10/2017 09:28 PM    Bacteria 3+ 07/10/2017 09:28 PM    WBC 5-10 07/10/2017 09:28 PM    RBC 0-5 07/10/2017 09:28 PM           CULTURES:    No results found for: SDES No results found for: CULT     CT Results (most recent):    Results from [place] encounter on 07/10/17   CTA CHEST W OR W WO CONT   Narrative EXAM: CT ANGIOGRAPHY CHEST   INDICATION: Elevated d-dimer. COMPARISON: None. CONTRAST: 60 mL of Isovue-370. TECHNIQUE:   Precontrast  images were obtained to localize the volume for acquisition. Multislice helical CT arteriography was performed from the diaphragm to the  thoracic inlet during uneventful rapid bolus intravenous contrast  administration. Lung and soft tissue windows were generated.  Coronal and  sagittal  reformatted images were also generated and 3D post processing  consisting of coronal maximum intensity projection images was performed. CT dose  reduction was achieved through use of a standardized protocol tailored for this  examination and automatic exposure control for dose modulation. Adaptive  statistical iterative reconstruction (ASIR) was utilized for this examination. FINDINGS:  LOWER NECK: The visualized portions of the thyroid and structures of the lower  neck are within normal limits. LUNGS: The lungs are clear of mass, nodule, airspace disease or edema. PLEURA: There is no pleural effusion or pneumothorax. PULMONARY ARTERIES: The pulmonary arteries are well enhanced and no pulmonary  emboli are identified. AORTA: The aorta is atherosclerotic and enhances without evidence of aneurysm or  dissection. There is coronary artery calcification. MEDIASTINUM: There is no mediastinal or hilar adenopathy or mass. BONES AND SOFT TISSUES: There are compression fractures of T8 and T10.  UPPER ABDOMEN: The visualized portions of the upper abdominal organs are normal.         Impression IMPRESSION:   1. No pulmonary embolus. 2. Atherosclerosis with coronary artery calcification. 3. T8 and T10 compression fractures.             (Use of word \"will\" = I will, or I did, or done by me, or to be done by me)  Reggie Pacheco MD

## 2017-07-11 NOTE — PROGRESS NOTES
CM follow up. CM met with patient and wife at the bedside. Patient continues with diagnostic work up. There does not appear to be progress with therapies. General safety issues at home are significant as pattern of multiple falls are reported. Discussed started regarding inpatient versus SNF rehab. Patient's ability to meaningfully participate in the discussion was limited. CM to continue to follow for discharge planning needs.

## 2017-07-11 NOTE — ROUTINE PROCESS
TRANSFER - OUT REPORT:    Verbal report given to Peg(name) on Gale Low  being transferred to 6S(unit) for routine progression of care       Report consisted of patients Situation, Background, Assessment and   Recommendations(SBAR). Information from the following report(s) SBAR was reviewed with the receiving nurse. Lines:   Peripheral IV 07/10/17 Right Hand (Active)   Site Assessment Clean, dry, & intact 7/10/2017  1:37 PM   Phlebitis Assessment 0 7/10/2017  1:37 PM   Infiltration Assessment 0 7/10/2017  1:37 PM   Dressing Status Clean, dry, & intact 7/10/2017  1:37 PM   Dressing Type Tape;Transparent 7/10/2017  1:37 PM   Hub Color/Line Status Pink;Capped;Flushed;Patent 7/10/2017  1:37 PM   Action Taken Blood drawn 7/10/2017  1:37 PM       Peripheral IV 07/10/17 Right Antecubital (Active)   Site Assessment Clean, dry, & intact 7/10/2017  5:25 PM   Phlebitis Assessment 0 7/10/2017  5:25 PM   Infiltration Assessment 0 7/10/2017  5:25 PM   Dressing Status Clean, dry, & intact 7/10/2017  5:25 PM   Dressing Type Tape;Transparent 7/10/2017  5:25 PM   Hub Color/Line Status Pink;Capped;Flushed;Patent 7/10/2017  5:25 PM   Action Taken Blood drawn 7/10/2017  5:25 PM        Opportunity for questions and clarification was provided.       Patient transported with:   Monitor  O2 @ 1 liters  Tech

## 2017-07-11 NOTE — CONSULTS
Cardiology Consult Note      Patient Name: Jose White  : 1950 MRN: [id number]  Date: 2017  Time: 11:01 AM    Admit Diagnosis: Chest pain    Primary Cardiologist: [facility]   Consulting Cardiologist: Dr. Idris Moran    Reason for Consult: chest pain    Requesting MD: Marylee Minister, MD    HPI:  Jose White is a 78 y.o. female admitted on 7/10/2017  for Chest pain. Past medical history of CAD (coronary artery disease); Hypertension; and Hypokalaemia. 77 yo female presented to ED with c/o chest pain. She was found to have multiple falls in the past few months, averaging 4 times per week. She recently fell, sustaining laceration to left orbit and knees. She is a poor historian, oriented to self, sometimes place, not time. She states she had an MI 15 years ago. Dr. Laina Rogel used to be her cardiologist, but has not seen him in 5+ years. EKG on admission shows sinus tach. Subjective:  Currently she denies CP or SOB. Her pain was upper epigastric to lower sternum. Does not get worse with breathing, but reproducible with palpation. She is pleasantly confused. She repeats herself often. Assessment and Plan     1. Chest pain - Atypical - reproducible with palpation, suspect trauma from fall   - EKG w/o evidence for ACS   - Serial troponin levels normal   - Echo ordered  2. Confusion   - Metabolic encephalopathy - ?etiology   - per Primary team  3. CAD   - h/o MI, no intervention   - Used to see Cardiologist, has not seen in 5+ years   -  mg  4. HTN   - Norvasc 10 mg PTA    Atypical chest pain - reproducible with palpation. EKG w/o evidence for ACS, troponin levels normal.  Will check echo. Has remote h/o MI and calcifications noted on CTA. Will follow up echo. Suspect chest pain is from trauma from fall. Saw and evaluated pt and agree with above assessment and plan. Was unaware pt was a [facility] patient prior to consult. Chest pain is non cardiac and no evidence of acute ischemia. Echo pending and will review. No additional cardiac evaluation indicated and will sign off. F/u outpt with [facility] cardiology prn as all her previous cardiac records are there; pt formerly with Dr. Brandon Cali. Hugo Ballesteros MD    Review of Symptoms:  Review of systems not obtained due to patient factors. Previous treatment/evaluation includes none .   Cardiac risk factors: dyslipidemia, hypertension, post-menopausal.    Past Medical History:   Diagnosis Date    CAD (coronary artery disease)     Hypertension     Hypokalaemia      Past Surgical History:   Procedure Laterality Date    HX APPENDECTOMY      HX OOPHORECTOMY Right     HX TONSILLECTOMY       Current Facility-Administered Medications   Medication Dose Route Frequency    oxyCODONE-acetaminophen (PERCOCET) 5-325 mg per tablet 1 Tab  1 Tab Oral Q6H PRN    ondansetron (ZOFRAN) injection 4 mg  4 mg IntraVENous Q6H PRN    heparin (porcine) injection 5,000 Units  5,000 Units SubCUTAneous Q8H    amLODIPine (NORVASC) tablet 10 mg  10 mg Oral DAILY    cholecalciferol (VITAMIN D3) tablet 1,000 Units  1,000 Units Oral DAILY    aspirin (ASPIRIN) tablet 325 mg  325 mg Oral DAILY    levoFLOXacin (LEVAQUIN) 750 mg in D5W IVPB  750 mg IntraVENous Q24H    0.9% sodium chloride infusion  100 mL/hr IntraVENous CONTINUOUS       Allergies   Allergen Reactions    Pcn [Penicillins] Swelling      Family History   Problem Relation Age of Onset    Diabetes Mother       Social History     Social History    Marital status:      Spouse name: N/A    Number of children: N/A    Years of education: N/A     Social History Main Topics    Smoking status: Former Smoker     Packs/day: 0.50     Quit date: 10/19/2011    Smokeless tobacco: Never Used    Alcohol use 0.6 - 1.2 oz/week     1 - 2 Standard drinks or equivalent per week      Comment: one drink per week    Drug use: No    Sexual activity: Yes Partners: Male     Other Topics Concern    None     Social History Narrative       Objective:    Physical Exam    Vitals:   Vitals:    07/11/17 0300 07/11/17 0700 07/11/17 0956 07/11/17 1113   BP: 129/62 124/61  107/65   Pulse: 100 98 96 93   Resp: 20 19  22   Temp: 98.6 °F (37 °C) 98.7 °F (37.1 °C)  97.9 °F (36.6 °C)   SpO2: 97% 98%  96%   Weight: 132 lb (59.9 kg)      Height:           General:    Alert, cooperative, no distress, appears stated age. Neck:   Supple, no carotid bruit and no JVD. Back:     Symmetric, mild curvature. Lungs:     Clear to auscultation bilaterally. Heart[de-identified]    Regular rate and rhythm, S1, S2 normal, no murmur, click, rub or gallop. Abdomen:     Soft, non-tender. Bowel sounds normal.    Extremities:   Extremities normal, atraumatic, no cyanosis or edema. Vascular:   Pulses - 2+ radials   Skin:   Skin color normal. No rashes or lesions   Neurologic:   CN II-XII grossly intact. Telemetry: normal sinus rhythm    ECG: sinus tachycardia    Data Review:     Radiology: No acute process    Recent Labs      07/11/17   0314  07/10/17   1328   CPK   --   32   TROIQ  <0.04  <0.04     Recent Labs      07/11/17   0314  07/10/17   1328   NA  127*  130*   K  3.0*  3.3*   CL  91*  93*   CO2  23  23   BUN  16  20   CREA  0.69  0.80   GLU  142*  120*   CA  8.9  9.6     Recent Labs      07/10/17   1328   WBC  15.3*   HGB  13.9   HCT  39.3   PLT  359     Recent Labs      07/10/17   1328   SGOT  19   AP  109     No results for input(s): CHOL, LDLC in the last 72 hours. No lab exists for component: TGL, HDLC,  HBA1C  No results for input(s): CRP, TSH, TSHEXT, TSHEXT in the last 72 hours.     No lab exists for component: ESR    Kush Soriano PA-C         [facility] [address] [place] [address], [address] [place] [address] [address] [place] [address]     [id number], DO

## 2017-07-11 NOTE — PROGRESS NOTES
TRANSFER - IN REPORT:    Verbal report received from Breonna(name) on Shonda Koroma  being received from ER(unit) for routine progression of care      Report consisted of patients Situation, Background, Assessment and   Recommendations(SBAR). Information from the following report(s) SBAR, Kardex, MAR, Accordion, Recent Results, Med Rec Status and Alarm Parameters  was reviewed with the receiving nurse. Opportunity for questions and clarification was provided. This nurse questioned oxygen status. Was told ER nurse would take care of placing pt on O2    Assessment completed upon patients arrival to unit and care assumed. Primary Nurse Clarke Swan and Hyacinth Sotelo RN performed a dual skin assessment on this patient No impairment noted  Edgar score is 19  Scattered bruising was noted no skin break down on sacrum was noted.

## 2017-07-11 NOTE — PROGRESS NOTES
Pt 02 per chart has not gone over 93 % since 1935. Requested that ER nurse address this concern before transferring pt. This nurse has placed the pt on 2 L.

## 2017-07-11 NOTE — PROGRESS NOTES
Admission Medication Reconciliation:    Information obtained from: patient's     Significant PMH/Disease States:   Past Medical History:   Diagnosis Date    CAD (coronary artery disease)     Hypertension     Hypokalaemia        Chief Complaint for this Admission:    Chief Complaint   Patient presents with    Chest Pain         Allergies:  Pcn [penicillins]    Prior to Admission Medications:   Prior to Admission Medications   Prescriptions Last Dose Informant Patient Reported? Taking? DULoxetine (CYMBALTA) 60 mg capsule 7/9/2017  No Yes   Sig: Take 1 Cap by mouth daily. amLODIPine (NORVASC) 10 mg tablet 7/9/2017  No Yes   Sig: Take 1 Tab by mouth daily. aspirin (ASPIRIN) 325 mg tablet 7/9/2017  Yes Yes   Sig: Take 325 mg by mouth daily. cholecalciferol (VITAMIN D3) 1,000 unit tablet 7/9/2017  Yes Yes   Sig: Take 1,000 Units by mouth daily. diazePAM (VALIUM) 5 mg tablet 7/9/2017  No Yes   Sig: TAKE ONE TABLET BY MOUTH EVERY 12 HOURS AS NEEDED FOR ANXIETY   glucosam-chond-hyalu-CF borate (MOVE FREE [facility]) 750 mg-100 mg- 1.65 mg-108 mg tab 7/9/2017  Yes Yes   Sig: Take 1 Tab by mouth daily. hydroCHLOROthiazide (HYDRODIURIL) 25 mg tablet 7/9/2017  No Yes   Sig: Take 1 Tab by mouth daily. naproxen sodium (ALEVE) 220 mg tablet 7/9/2017  Yes Yes   Sig: Take 220 mg by mouth two (2) times daily as needed. potassium 99 mg tablet 7/9/2017  Yes Yes   Sig: Take 99 mg by mouth daily. Facility-Administered Medications: None         Comments/Recommendations: This medication history was obtained from patient's ; (s)he appears to be a good historian. An RX Query is available. Medications added: aleve bid prn, \"move free\" glucosamine supplement  Medications deleted: vit D 45853, hydrocodone  Medications amended: vitamin d3 from 2000 to 1000 iu    Last doses of medication: yesterday  Review of Allergies: pcn      Thank you for allowing me to participate in the care of this patient. Please contact the pharmacy () or the medication reconciliation pharmacy () with any questions.     Lulu Linares, PharmD

## 2017-07-11 NOTE — PROGRESS NOTES
The following herbal, alternative, and/or nutritional/dietary supplement product(s) has been discontinued  per [employer] approved policy:    Potassium 99    Please reorder upon discharge if appropriate.

## 2017-07-11 NOTE — PROGRESS NOTES
Problem: Discharge Planning  Goal: *Discharge to safe environment  Outcome: Not Progressing Towards Goal  Considering inpatient rehab versus SNF rehab.

## 2017-07-12 LAB
AMMONIA PLAS-SCNC: 13 UMOL/L
ANION GAP BLD CALC-SCNC: 9 MMOL/L (ref 5–15)
BACTERIA SPEC CULT: ABNORMAL
BASOPHILS # BLD AUTO: 0 K/UL (ref 0–0.1)
BASOPHILS # BLD: 0 % (ref 0–1)
BUN SERPL-MCNC: 15 MG/DL (ref 6–20)
BUN/CREAT SERPL: 17 (ref 12–20)
CALCIUM SERPL-MCNC: 9 MG/DL (ref 8.5–10.1)
CC UR VC: ABNORMAL
CHLORIDE SERPL-SCNC: 99 MMOL/L (ref 97–108)
CO2 SERPL-SCNC: 26 MMOL/L (ref 21–32)
CREAT SERPL-MCNC: 0.87 MG/DL (ref 0.55–1.02)
EOSINOPHIL # BLD: 0.1 K/UL (ref 0–0.4)
EOSINOPHIL NFR BLD: 0 % (ref 0–7)
ERYTHROCYTE [DISTWIDTH] IN BLOOD BY AUTOMATED COUNT: 12.7 % (ref 11.5–14.5)
GLUCOSE SERPL-MCNC: 81 MG/DL (ref 65–100)
HCT VFR BLD AUTO: 32.7 % (ref 35–47)
HGB BLD-MCNC: 10.9 G/DL (ref 11.5–16)
LYMPHOCYTES # BLD AUTO: 7 % (ref 12–49)
LYMPHOCYTES # BLD: 0.9 K/UL (ref 0.8–3.5)
MCH RBC QN AUTO: 27.7 PG (ref 26–34)
MCHC RBC AUTO-ENTMCNC: 33.3 G/DL (ref 30–36.5)
MCV RBC AUTO: 83.2 FL (ref 80–99)
MONOCYTES # BLD: 1.4 K/UL (ref 0–1)
MONOCYTES NFR BLD AUTO: 11 % (ref 5–13)
NEUTS SEG # BLD: 11 K/UL (ref 1.8–8)
NEUTS SEG NFR BLD AUTO: 82 % (ref 32–75)
PLATELET # BLD AUTO: 298 K/UL (ref 150–400)
POTASSIUM SERPL-SCNC: 3.6 MMOL/L (ref 3.5–5.1)
RBC # BLD AUTO: 3.93 M/UL (ref 3.8–5.2)
SERVICE CMNT-IMP: ABNORMAL
SODIUM SERPL-SCNC: 134 MMOL/L (ref 136–145)
WBC # BLD AUTO: 13.4 K/UL (ref 3.6–11)

## 2017-07-12 PROCEDURE — 74011250637 HC RX REV CODE- 250/637: Performed by: INTERNAL MEDICINE

## 2017-07-12 PROCEDURE — 97116 GAIT TRAINING THERAPY: CPT

## 2017-07-12 PROCEDURE — 74011250636 HC RX REV CODE- 250/636: Performed by: INTERNAL MEDICINE

## 2017-07-12 PROCEDURE — 36415 COLL VENOUS BLD VENIPUNCTURE: CPT | Performed by: INTERNAL MEDICINE

## 2017-07-12 PROCEDURE — 85025 COMPLETE CBC W/AUTO DIFF WBC: CPT | Performed by: INTERNAL MEDICINE

## 2017-07-12 PROCEDURE — 65270000029 HC RM PRIVATE

## 2017-07-12 PROCEDURE — 80048 BASIC METABOLIC PNL TOTAL CA: CPT | Performed by: INTERNAL MEDICINE

## 2017-07-12 PROCEDURE — 82140 ASSAY OF AMMONIA: CPT | Performed by: INTERNAL MEDICINE

## 2017-07-12 RX ADMIN — HEPARIN SODIUM 5000 UNITS: 5000 INJECTION, SOLUTION INTRAVENOUS; SUBCUTANEOUS at 10:10

## 2017-07-12 RX ADMIN — LEVOFLOXACIN 750 MG: 5 INJECTION, SOLUTION INTRAVENOUS at 01:06

## 2017-07-12 RX ADMIN — HEPARIN SODIUM 5000 UNITS: 5000 INJECTION, SOLUTION INTRAVENOUS; SUBCUTANEOUS at 01:10

## 2017-07-12 RX ADMIN — SODIUM CHLORIDE 100 ML/HR: 900 INJECTION, SOLUTION INTRAVENOUS at 22:00

## 2017-07-12 RX ADMIN — VITAMIN D, TAB 1000IU (100/BT) 1000 UNITS: 25 TAB at 10:10

## 2017-07-12 RX ADMIN — OXYCODONE HYDROCHLORIDE AND ACETAMINOPHEN 1 TABLET: 5; 325 TABLET ORAL at 17:59

## 2017-07-12 RX ADMIN — AMLODIPINE BESYLATE 10 MG: 5 TABLET ORAL at 10:10

## 2017-07-12 RX ADMIN — HEPARIN SODIUM 5000 UNITS: 5000 INJECTION, SOLUTION INTRAVENOUS; SUBCUTANEOUS at 17:08

## 2017-07-12 RX ADMIN — ASPIRIN 325 MG: 325 TABLET ORAL at 10:10

## 2017-07-12 NOTE — ROUTINE PROCESS
Bedside and Verbal shift change report given to [address] [facility] (oncoming nurse) by Stoney Banuelos (offgoing nurse). Report included the following information SBAR, Kardex and Recent Results.

## 2017-07-12 NOTE — PROGRESS NOTES
Assessemnt/Plan:   Daily Progress Note: 2017    Admit date: 7/10/2017  2:54 PM    Hospitalist Progress Note   Brian Harris [phone]; Call physician on-call through the  7pm-7am          PCP: Inis Cushing, [address] Procedure:   * No surgery found *  Consultants this Hospitalization:   ED CONSULT TO [facility] [address]  ED CONSULT  [place] TO HOSPITALIST  IP CONSULT TO [address] [facility] Procedure:   * No surgery found *           NAME:  Juan Burger      :  1961  MRN:  [id number]      Admission Summary:     Multiple falls, cp on admission. Chief Complaint for 2017 ; and pertinent  Interval history / Subjective:         2017 concern and/or interventions/findings; additions to assess/plans below. · Case discussed with , he is in agreement that placement for now in rehab/snf is best, pt preferrs going home then to adult day care, she is not comprehenidng the fact that now she is too debilitated to return home  · As to uTI improved as is the dehydration ergo as pt completes PT/OT 2017 disopositon can be decided and discharge accordingly . Assessment & Plan: Additionally addressed by this writer at this time:              · Metabolic encephalopathy /Confusion, worst in setting of uti and dehydration, improved numbers lab wise and pt nearing baseline 2017   · Multiple fall, bruises noted tim rt inner thigh,  No fx on knee xray,   Multi-body MS discomforts. Needs inpt SNF/rehab seems   · HTN, cont on home amlodipine  But dc home diuretic. · Hyponatremia, suspect total body sodium down, 2n2 dehydration, f/u lab in am - improved 2017   · Dehydration , replenish isotonically and f/u lab  · UTI, cult pending. GNR id/sensitivity pending, wbc down and afeb.    · Contusion of lt knee, stable no fx by xray   ·   · Anxiety of record for which pt takes valium, not clear that this is a resonable option given falls and confusion. Will cont to hold home med. · T spine compression fx ? Age, T  and T 10  Non tender, discussed with , no rx indicated. Seem old. Code status: full   DVT prophylaxis: SCD's ordered pt not ruma, will start Heparin. Care Plan discussed with: Patient/Family, Nurse and   Disposition: Home w/Family and TBD     Subjective:     ROS                   Could NOT obtain due to:  confusion      Objective:       PHYSICAL EXAM:  Vital signs below          Constitutional:   + acute distress  ,   +  Alert;         +  Conversant;      Eyes: anicteric sclerae, moist conjunctivae;    no  lid-lag;    + PERRLA;    Ears, nose,mouth,and throat:   Normal external ears ; oropharynx clear with  More mosit than prior day  mucous membranes; no posterior oral lesions; and  trach is   mid line; Respiratory:    Trachea midline;  CTA    with normal   respiratory effort; and no    intercostal retractions;    Cardiovascular: RRR, no MRGs;    Gastrointestinal: Soft, non-tender  ; no   masses or HSM; no   guarding; no   rebound;         Musculoskelatal:  extreme ties are supple;  no   peripheral edema;  neck with   FROM; bruises tim inner thigh rt leg  Skin:  Temperature nl  ;  turgor is nl   ; the texture nl   ;  no   rash;     Neurologic: CN 2- 12   intact;  no   focal motor weakness;   ;    Psychiatric:  Appropriate   affect; alert   ; and not oriented to , place  , and time ;    Heme/Lymph/Immune:  Cervical adenopathy not   present; axillary adenopathy not   present;       VITALS:   Last 24hrs VS reviewed since prior progress note.  Most recent are:  Patient Vitals for the past 24 hrs:   Temp Pulse Resp BP SpO2   07/12/17 0658 98.3 °F (36.8 °C) 95 21 104/70 93 %   07/12/17 0300 99.8 °F (37.7 °C) 97 20 105/62 92 %   07/11/17 2300 98.5 °F (36.9 °C) 94 15 108/57 95 %   07/11/17 1900 98.4 °F (36.9 °C) 98 19 118/68 96 %   07/11/17 1500 98.3 °F (36.8 °C) 69 19 112/55 -   07/11/17 1113 97.9 °F (36.6 °C) 93 22 107/65 96 %   07/11/17 0956 - 96 - - -     No intake or output data in the 24 hours ending 07/12/17 0849             ______________________________________________________________________  Medicines:    Current Facility-Administered Medications:     oxyCODONE-acetaminophen (PERCOCET) 5-325 mg per tablet 1 Tab, 1 Tab, Oral, Q6H PRN, Jomar Patel MD, 1 Tab at 07/11/17 1341    ondansetron (ZOFRAN) injection 4 mg, 4 mg, IntraVENous, Q6H PRN, Jomar Patel MD, 4 mg at 07/11/17 0955    heparin (porcine) injection 5,000 Units, 5,000 Units, SubCUTAneous, Q8H, Christi Trevino MD, 5,000 Units at 07/12/17 0110    amLODIPine (NORVASC) tablet 10 mg, 10 mg, Oral, DAILY, Christy Downs MD, 10 mg at 07/11/17 0956    cholecalciferol (VITAMIN D3) tablet 1,000 Units, 1,000 Units, Oral, DAILY, Christy Downs MD, 1,000 Units at 07/11/17 0956    aspirin (ASPIRIN) tablet 325 mg, 325 mg, Oral, DAILY, Christy Downs MD, 325 mg at 07/11/17 0956    levoFLOXacin (LEVAQUIN) 750 mg in D5W IVPB, 750 mg, IntraVENous, Q24H, Christy Downs MD, Last Rate: 100 mL/hr at 07/12/17 0106, 750 mg at 07/12/17 0106    0.9% sodium chloride infusion, 100 mL/hr, IntraVENous, CONTINUOUS, Christy Downs MD, Last Rate: 100 mL/hr at 07/11/17 0001, 100 mL/hr at 07/11/17 0001  Procedures: see electronic medical records for all procedures/Xrays and details which were not copied into this note but were reviewed prior to creation of Plan.       LABS:  Recent Labs      07/12/17   0353  07/10/17   1328   WBC  13.4*  15.3*   HGB  10.9*  13.9   HCT  32.7*  39.3   PLT  298  359     Recent Labs      07/12/17   0353  07/11/17   0314  07/10/17   1328   NA  134*  127*  130*   K  3.6  3.0*  3.3*   CL  99  91*  93*   CO2  26  23  23   BUN  15  16  20   CREA  0.87  0.69  0.80   GLU  81  142*  120*   CA  9.0  8.9  9.6     Recent Labs      07/10/17   1328   SGOT  19   ALT  18   AP  109   TBILI  0.7   TP  8.9* ALB  3.6   GLOB  5.3*     No results for input(s): INR, PTP, APTT in the last 72 hours. No lab exists for component: INREXT, INREXT   No results for input(s): FE, TIBC, PSAT, FERR in the last 72 hours. Lab Results   Component Value Date/Time    Folate 4.4 09/17/2015 11:06 AM      No results for input(s): PH, PCO2, PO2 in the last 72 hours. No results for input(s): PHI, PO2I, PCO2I in the last 72 hours. Recent Labs      07/11/17   0314  07/10/17   1328   CPK   --   32   CKNDX   --   Cannot be calulated   TROIQ  <0.04  <0.04     Lab Results   Component Value Date/Time    Cholesterol, total 210 04/04/2017 10:49 AM    HDL Cholesterol 77 04/04/2017 10:49 AM    LDL, calculated 117 04/04/2017 10:49 AM    Triglyceride 79 04/04/2017 10:49 AM    CHOL/HDL Ratio 2.6 05/10/2010 09:42 AM     No results found for: [facility]  Lab Results   Component Value Date/Time    Color YELLOW/STRAW 07/10/2017 09:28 PM    Appearance CLEAR 07/10/2017 09:28 PM    Specific gravity 1.025 07/10/2017 09:28 PM    Specific gravity 1.026 04/22/2017 04:06 PM    pH (UA) 5.5 07/10/2017 09:28 PM    Protein TRACE 07/10/2017 09:28 PM    Glucose NEGATIVE  07/10/2017 09:28 PM    Ketone 40 07/10/2017 09:28 PM    Bilirubin NEGATIVE  07/10/2017 09:28 PM    Urobilinogen 0.2 07/10/2017 09:28 PM    Nitrites POSITIVE 07/10/2017 09:28 PM    Leukocyte Esterase TRACE 07/10/2017 09:28 PM    Epithelial cells FEW 07/10/2017 09:28 PM    Bacteria 3+ 07/10/2017 09:28 PM    WBC 5-10 07/10/2017 09:28 PM    RBC 0-5 07/10/2017 09:28 PM           CULTURES:    No results found for: CASH Lab Results   Component Value Date/Time    Culture result: GRAM NEGATIVE RODS 07/10/2017 09:28 PM        CT Results (most recent):    Results from Hospital Encounter encounter on 07/10/17   CTA CHEST W OR W WO CONT   Narrative EXAM: CT ANGIOGRAPHY CHEST   INDICATION: Elevated d-dimer. COMPARISON: None. CONTRAST: 60 mL of Isovue-370.     TECHNIQUE:   Precontrast  images were obtained to localize the volume for acquisition. Multislice helical CT arteriography was performed from the diaphragm to the  thoracic inlet during uneventful rapid bolus intravenous contrast  administration. Lung and soft tissue windows were generated. Coronal and  sagittal  reformatted images were also generated and 3D post processing  consisting of coronal maximum intensity projection images was performed. CT dose  reduction was achieved through use of a standardized protocol tailored for this  examination and automatic exposure control for dose modulation. Adaptive  statistical iterative reconstruction (ASIR) was utilized for this examination. FINDINGS:  LOWER NECK: The visualized portions of the thyroid and structures of the lower  neck are within normal limits. LUNGS: The lungs are clear of mass, nodule, airspace disease or edema. PLEURA: There is no pleural effusion or pneumothorax. PULMONARY ARTERIES: The pulmonary arteries are well enhanced and no pulmonary  emboli are identified. AORTA: The aorta is atherosclerotic and enhances without evidence of aneurysm or  dissection. There is coronary artery calcification. MEDIASTINUM: There is no mediastinal or hilar adenopathy or mass. BONES AND SOFT TISSUES: There are compression fractures of T8 and T10.  UPPER ABDOMEN: The visualized portions of the upper abdominal organs are normal.         Impression IMPRESSION:   1. No pulmonary embolus. 2. Atherosclerosis with coronary artery calcification. 3. T8 and T10 compression fractures.             (Use of word \"will\" = I will, or I did, or done by me, or to be done by me)  Princess Tj MD

## 2017-07-12 NOTE — PROGRESS NOTES
TRANSFER - IN REPORT:    Verbal report received from Varsha Rodríguez RN(name) on Leslie Siegel  being received from Neuro(unit) for routine progression of care      Report consisted of patients Situation, Background, Assessment and   Recommendations(SBAR). Information from the following report(s) SBAR, Kardex, [facility] and Recent Results was reviewed with the receiving nurse. Opportunity for questions and clarification was provided. Assessment completed upon patients arrival to unit and care assumed. Per report given by RN, patient is on bedrest per MD. Paged Dr. Kp Baxter because there is no bedrest order in for the patient. Per Dr. Kp Baxter, patient is not on bedrest and is safe to ambulate with therapy, staff.

## 2017-07-12 NOTE — ROUTINE PROCESS
TRANSFER - OUT REPORT:    Verbal report given to Conor LEVINE regarding Burnard Diane being ttransferred to [address] for routine progression of care. Report consisted of patients Situation, Background, Assessment and Recommendations(SBAR).

## 2017-07-12 NOTE — PROGRESS NOTES
Problem: Self Care Deficits Care Plan (Adult)  Goal: *Acute Goals and Plan of Care (Insert Text)  Occupational Therapy Goals  Initiated 7/11/2017  1. Patient will perform upper body bathing with supervision/set-up within 7 day(s). 2. Patient will perform lower body dressing with maximal assistance within 7 day(s). 3. Patient will perform grooming sitting EOB with supervision/set-up within 7 day(s). 4. Patient will perform toilet transfers with moderate assistance within 7 day(s). 5. Patient will perform all aspects of toileting with moderate assistance within 7 day(s). 6. Patient will participate in upper extremity therapeutic exercise/activities with supervision/set-up for 10 minutes within 7 day(s). 7. Patient will utilize energy conservation techniques during functional activities with visual cues within 7 day(s). OCCUPATIONAL THERAPY TREATMENT  Patient: Boris Santa (49 y.o. female)  Date: 7/12/2017  Diagnosis: Chest pain <principal problem not specified>       Precautions: Fall      ASSESSMENT:  Pt continues to be limited by anxiety, decreased insight into deficits and abilities, and balance impairments. She benefited from cues to focus on what she CAN do and frequent re-direction to task at hand. Pt is capable of much more than she believes, fear of falling is a major barrier. Continue to feel pt is not safe to return home in this condition, she would benefit from rehab once medially stable to maximize safety and independence with ADL routine. Progression toward goals:  [ ]       Improving appropriately and progressing toward goals  [X]       Improving slowly and progressing toward goals  [ ]       Not making progress toward goals and plan of care will be adjusted       PLAN:  Patient continues to benefit from skilled intervention to address the above impairments. Continue treatment per established plan of care.   Discharge Recommendations:  Rehab  Further Equipment Recommendations for Discharge:  TBD       SUBJECTIVE:   Patient stated I wake my  up so he can move my arm.       OBJECTIVE DATA SUMMARY:   Cognitive/Behavioral Status:  Neurologic State: Alert  Orientation Level: Oriented X4  Cognition: Decreased attention/concentration;Decreased command following  Perception: Appears intact  Perseveration: No perseveration noted  Safety/Judgement: Decreased insight into deficits; Decreased awareness of need for safety     Functional Mobility and Transfers for ADLs:  Bed Mobility:  Supine to Sit: Minimum assistance; Additional time     Transfers:  Sit to Stand: Contact guard assistance        Balance:  Sitting: Intact; With support  Standing: Impaired  Standing - Static: Good;Constant support  Standing - Dynamic : Fair     ADL Intervention:        Cognitive Retraining  Safety/Judgement: Decreased insight into deficits; Decreased awareness of need for safety        Pain:  Pain Scale 1: Numeric (0 - 10)  Pain Intensity 1: 0              Activity Tolerance:   Pt tolerated all activity without s/s of distress. Please refer to the flowsheet for vital signs taken during this treatment.   After treatment:   [X] Patient left in no apparent distress sitting up in chair  [ ] Patient left in no apparent distress in bed  [X] Call bell left within reach  [X] Nursing notified  [ ] Caregiver present  [X] chair alarm activated      COMMUNICATION/COLLABORATION:   The patients plan of care was discussed with: Physical Therapist and Registered Nurse  Darion Gibson OTR/L     Time Calculation: 16 mins

## 2017-07-12 NOTE — PROGRESS NOTES
Chad Bernard RN and Linda Mendes RN performed a dual skin assessment on this patient   No impairment noted that requires wound documentation  Edgar score is 16        Pt has scabs to left knee, left elbow, left forearm. Bruising to left leg, arm, left side of face. Healing abrasion to left eye. Large, dark bruise to right inner thigh. Sacrum is pink, but blanchable.

## 2017-07-12 NOTE — PROGRESS NOTES
Problem: Mobility Impaired (Adult and Pediatric)  Goal: *Acute Goals and Plan of Care (Insert Text)  Physical Therapy Goals  Initiated 7/11/2017  1. Patient will move from supine to sit and sit to supine in bed with minimal assistance/contact guard assist within 7 day(s). 2. Patient will transfer from bed to chair and chair to bed with minimal assistance/contact guard assist using the least restrictive device within 7 day(s). 3. Patient will perform sit to stand with minimal assistance/contact guard assist within 7 day(s). 4. Patient will ambulate with minimal assistance/contact guard assist for 25 feet with the least restrictive device within 7 day(s). 5. Patient will ascend/descend 6 stairs with 2 handrail(s) with moderate assistance within 7 day(s). PHYSICAL THERAPY TREATMENT  Patient: Omkar Carson (50 y.o. female)  Date: 7/12/2017  Diagnosis: Chest pain <principal problem not specified>       Precautions: Fall      ASSESSMENT:  Patient continues to require max encouragement and firm instruction to participate. Overall functioning at a min A level for mobility. She requires increased time for all tasks and offers multiple excuses to limit participation. Patient appears to have self limiting behavior but is able to do more physically than she believes. Ambulated around bed with min A x 2, HHA for balance. Patient remained up in chair at end of session. Recommend SNF. Progression toward goals:  [ ]    Improving appropriately and progressing toward goals  [X]    Improving slowly and progressing toward goals  [ ]    Not making progress toward goals and plan of care will be adjusted       PLAN:  Patient continues to benefit from skilled intervention to address the above impairments. Continue treatment per established plan of care. Discharge Recommendations:  Skilled Nursing Facility  Further Equipment Recommendations for Discharge:  TBD       SUBJECTIVE:   Patient stated I can't.  OBJECTIVE DATA SUMMARY:   Critical Behavior:  Neurologic State: Alert  Orientation Level: Oriented X4  Cognition: Decreased attention/concentration, Decreased command following  Safety/Judgement: Awareness of environment, Fall prevention, Home safety  Functional Mobility Training:  Bed Mobility:     Supine to Sit: Minimum assistance; Additional time              Transfers:  Sit to Stand: Contact guard assistance  Stand to Sit: Minimum assistance        Bed to Chair: Minimum assistance                    Balance:  Sitting: Intact; With support  Standing: Impaired  Standing - Static: Good;Constant support  Standing - Dynamic : Fair  Ambulation/Gait Training:  Distance (ft): 12 Feet (ft)  Assistive Device: Gait belt (HHA)  Ambulation - Level of Assistance: Minimal assistance;Assist x2        Gait Abnormalities: Decreased step clearance;Shuffling gait        Base of Support: Narrowed     Speed/Leigh: Fluctuations  Step Length: Right shortened;Left shortened                               Stairs:                       Neuro Re-Education:     Therapeutic Exercises:      Pain:  Pain Scale 1: Numeric (0 - 10)  Pain Intensity 1: 0              Activity Tolerance:   Self limiting   Please refer to the flowsheet for vital signs taken during this treatment.   After treatment:   [X]    Patient left in no apparent distress sitting up in chair  [ ]    Patient left in no apparent distress in bed  [X]    Call bell left within reach  [X]    Nursing notified  [ ]    Caregiver present  [X]    Chair alarm activated      COMMUNICATION/COLLABORATION:   The patients plan of care was discussed with: Registered Nurse     Shella Aschoff, PT   Time Calculation: 15 mins

## 2017-07-12 NOTE — ROUTINE PROCESS
TRANSFER - OUT REPORT:    Verbal report given to [facility], RN (name) on Kacie Alford  being transferred to [place] [phone] (unit) for routine progression of care       Report consisted of patients Situation, Background, Assessment and   Recommendations(SBAR). Information from the following report(s) SBAR, Kardex, [facility] and Recent Results was reviewed with the receiving nurse. Lines:   Peripheral IV 07/10/17 Right Hand (Active)   Site Assessment Clean, dry, & intact 7/12/2017 12:00 AM   Phlebitis Assessment 0 7/12/2017 12:00 AM   Infiltration Assessment 0 7/12/2017 12:00 AM   Dressing Status Clean, dry, & intact 7/12/2017 12:00 AM   Dressing Type Tape;Transparent 7/12/2017 12:00 AM   Hub Color/Line Status Pink; Infusing 7/12/2017 12:00 AM   Action Taken Open ports on tubing capped 7/12/2017 12:00 AM   Alcohol Cap Used Yes 7/12/2017 12:00 AM       Peripheral IV 07/10/17 Right Antecubital (Active)   Site Assessment Clean, dry, & intact 7/12/2017 12:00 AM   Phlebitis Assessment 0 7/12/2017 12:00 AM   Infiltration Assessment 0 7/12/2017 12:00 AM   Dressing Status Clean, dry, & intact 7/12/2017 12:00 AM   Dressing Type Tape;Transparent 7/12/2017 12:00 AM   Hub Color/Line Status Pink;Capped 7/12/2017 12:00 AM   Action Taken Open ports on tubing capped 7/12/2017 12:00 AM   Alcohol Cap Used Yes 7/12/2017 12:00 AM        Opportunity for questions and clarification was provided.

## 2017-07-12 NOTE — PROGRESS NOTES
2330 - Bedside and Verbal shift change report given to shreya victoria (oncoming nurse) by Melissa jang (offgoing nurse). Report included the following information SBAR, Kardex, Intake/Output, MAR, Recent Results and Cardiac Rhythm NSR.   0730 - Bedside and Verbal shift change report given to ute (oncoming nurse) by Julio Cesar Maldonado (offgoing nurse). Report included the following information SBAR, Kardex, Intake/Output, MAR, Recent Results and Cardiac Rhythm NSR.

## 2017-07-13 LAB
ANION GAP BLD CALC-SCNC: 9 MMOL/L (ref 5–15)
BASOPHILS # BLD AUTO: 0 K/UL (ref 0–0.1)
BASOPHILS # BLD: 0 % (ref 0–1)
BUN SERPL-MCNC: 10 MG/DL (ref 6–20)
BUN/CREAT SERPL: 17 (ref 12–20)
CALCIUM SERPL-MCNC: 8.6 MG/DL (ref 8.5–10.1)
CHLORIDE SERPL-SCNC: 105 MMOL/L (ref 97–108)
CO2 SERPL-SCNC: 24 MMOL/L (ref 21–32)
CREAT SERPL-MCNC: 0.59 MG/DL (ref 0.55–1.02)
DIFFERENTIAL METHOD BLD: ABNORMAL
EOSINOPHIL # BLD: 0.2 K/UL (ref 0–0.4)
EOSINOPHIL NFR BLD: 2 % (ref 0–7)
ERYTHROCYTE [DISTWIDTH] IN BLOOD BY AUTOMATED COUNT: 12.8 % (ref 11.5–14.5)
GLUCOSE SERPL-MCNC: 80 MG/DL (ref 65–100)
HCT VFR BLD AUTO: 29.9 % (ref 35–47)
HGB BLD-MCNC: 10.2 G/DL (ref 11.5–16)
LYMPHOCYTES # BLD AUTO: 8 % (ref 12–49)
LYMPHOCYTES # BLD: 0.7 K/UL (ref 0.8–3.5)
MCH RBC QN AUTO: 28 PG (ref 26–34)
MCHC RBC AUTO-ENTMCNC: 34.1 G/DL (ref 30–36.5)
MCV RBC AUTO: 82.1 FL (ref 80–99)
MONOCYTES # BLD: 0.9 K/UL (ref 0–1)
MONOCYTES NFR BLD AUTO: 10 % (ref 5–13)
NEUTS SEG # BLD: 6.7 K/UL (ref 1.8–8)
NEUTS SEG NFR BLD AUTO: 80 % (ref 32–75)
PLATELET # BLD AUTO: 320 K/UL (ref 150–400)
POTASSIUM SERPL-SCNC: 3.1 MMOL/L (ref 3.5–5.1)
RBC # BLD AUTO: 3.64 M/UL (ref 3.8–5.2)
RBC MORPH BLD: ABNORMAL
SODIUM SERPL-SCNC: 138 MMOL/L (ref 136–145)
WBC # BLD AUTO: 8.5 K/UL (ref 3.6–11)

## 2017-07-13 PROCEDURE — 74011250636 HC RX REV CODE- 250/636: Performed by: INTERNAL MEDICINE

## 2017-07-13 PROCEDURE — 74011250637 HC RX REV CODE- 250/637: Performed by: INTERNAL MEDICINE

## 2017-07-13 PROCEDURE — 85025 COMPLETE CBC W/AUTO DIFF WBC: CPT | Performed by: INTERNAL MEDICINE

## 2017-07-13 PROCEDURE — 80048 BASIC METABOLIC PNL TOTAL CA: CPT | Performed by: INTERNAL MEDICINE

## 2017-07-13 PROCEDURE — 65270000029 HC RM PRIVATE

## 2017-07-13 PROCEDURE — 36415 COLL VENOUS BLD VENIPUNCTURE: CPT | Performed by: INTERNAL MEDICINE

## 2017-07-13 RX ORDER — POTASSIUM CHLORIDE 750 MG/1
40 TABLET, FILM COATED, EXTENDED RELEASE ORAL
Status: COMPLETED | OUTPATIENT
Start: 2017-07-13 | End: 2017-07-13

## 2017-07-13 RX ORDER — LEVOFLOXACIN 750 MG/1
750 TABLET ORAL
Status: DISCONTINUED | OUTPATIENT
Start: 2017-07-14 | End: 2017-07-15 | Stop reason: HOSPADM

## 2017-07-13 RX ORDER — SODIUM CHLORIDE 0.9 % (FLUSH) 0.9 %
5 SYRINGE (ML) INJECTION AS NEEDED
Status: DISCONTINUED | OUTPATIENT
Start: 2017-07-13 | End: 2017-07-15 | Stop reason: HOSPADM

## 2017-07-13 RX ORDER — ACETAMINOPHEN 325 MG/1
650 TABLET ORAL
Status: DISCONTINUED | OUTPATIENT
Start: 2017-07-13 | End: 2017-07-15 | Stop reason: HOSPADM

## 2017-07-13 RX ADMIN — VITAMIN D, TAB 1000IU (100/BT) 1000 UNITS: 25 TAB at 08:41

## 2017-07-13 RX ADMIN — ACETAMINOPHEN 650 MG: 325 TABLET, FILM COATED ORAL at 21:22

## 2017-07-13 RX ADMIN — AMLODIPINE BESYLATE 10 MG: 5 TABLET ORAL at 08:42

## 2017-07-13 RX ADMIN — ACETAMINOPHEN 650 MG: 325 TABLET, FILM COATED ORAL at 13:29

## 2017-07-13 RX ADMIN — POTASSIUM CHLORIDE 40 MEQ: 750 TABLET, FILM COATED, EXTENDED RELEASE ORAL at 11:11

## 2017-07-13 RX ADMIN — HEPARIN SODIUM 5000 UNITS: 5000 INJECTION, SOLUTION INTRAVENOUS; SUBCUTANEOUS at 08:42

## 2017-07-13 RX ADMIN — ASPIRIN 325 MG: 325 TABLET ORAL at 08:41

## 2017-07-13 RX ADMIN — LEVOFLOXACIN 750 MG: 5 INJECTION, SOLUTION INTRAVENOUS at 01:33

## 2017-07-13 RX ADMIN — SODIUM CHLORIDE 100 ML/HR: 900 INJECTION, SOLUTION INTRAVENOUS at 08:24

## 2017-07-13 RX ADMIN — HEPARIN SODIUM 5000 UNITS: 5000 INJECTION, SOLUTION INTRAVENOUS; SUBCUTANEOUS at 17:24

## 2017-07-13 RX ADMIN — SODIUM CHLORIDE 100 ML/HR: 900 INJECTION, SOLUTION INTRAVENOUS at 18:39

## 2017-07-13 RX ADMIN — Medication 5 ML: at 20:27

## 2017-07-13 RX ADMIN — OXYCODONE HYDROCHLORIDE AND ACETAMINOPHEN 1 TABLET: 5; 325 TABLET ORAL at 06:27

## 2017-07-13 RX ADMIN — HEPARIN SODIUM 5000 UNITS: 5000 INJECTION, SOLUTION INTRAVENOUS; SUBCUTANEOUS at 01:34

## 2017-07-13 NOTE — PROGRESS NOTES
Bedside shift change report given to Anjana Martinez RN (oncoming nurse) by Roxanne Batista RN (offgoing nurse). Report included the following information SBAR.

## 2017-07-13 NOTE — PROGRESS NOTES
Spiritual Care Partner Volunteer visited patient in [address] on 7/13/17. Documented by:  Randi Cota M.Div.    Paging Service [phone])

## 2017-07-13 NOTE — PROGRESS NOTES
Bedside shift [facility] ([facility] by Dennis Thomas (offgoing nurse). Report included the following information SBAR and Kardex.

## 2017-07-13 NOTE — PROGRESS NOTES
Assessemnt/Plan:   Daily Progress Note: 2017    Admit date: 7/10/2017  2:54 PM    Hospitalist Progress Note   Franco ADAMS [place], Patria Combe [phone]; Call physician on-call through the  7pm-7am          PCP: Bruna Rodrigues 171 Procedure:   * No surgery found *  Consultants this Hospitalization:   ED CONSULT TO [facility] [address]  ED CONSULT  S [place] TO HOSPITALIST  IP CONSULT TO [facility] Procedure:   * No surgery found *           NAME:  Denise Shaw      :  1961  MRN:  [id number]      Admission Summary:     Multiple falls, cp on admission. Chief Complaint for 2017 ; and pertinent  Interval history / Subjective:         2017 concern and/or interventions/findings; additions to assess/plans below. · Pt up with assist with me and , no pain, steps in place poorly  But will move forward a few steps. ·  notes that pt requires / assist.  Hopeful that rehab session will help  · Changing iv meds to po,   · Pt w/o pain issues again, no need to eval further the compression fx t 8  , 10 as old by clinical presentaion. Pt did not have pain in legs/knees though both bruised. · As to dx of anxiety depression, with the improved mental and the h/o frequent fall, would avoid minor tranquilizer as in diazepam and other class drugs , not given here. Assessment & Plan: Additionally addressed by this writer at this time:              · Metabolic encephalopathy /Confusion, worst in setting of uti and dehydration, improved numbers lab wise and pt at baseline 2017   · Multiple fall, bruises noted tim rt inner thigh,  No fx on knee xray,   Multi-body MS discomforts. Needs inpt SNF/rehab seems  Working on this placement with case/ agrees  · HTN, cont on home amlodipine  But dc home diuretic  Tim on discharge to not continue. .   · Hypokalemia, replaced.  Home med as to diuril continued effect playing a role, not on this now,   · Hyponatremia, suspect total body sodium down, 2n2 dehydration, f/u lab in am - improved 7/13/2017   · Dehydration , replenish isotonically and f/u lab ; iv to be dc'd 7/13/2017   · UTI, cult pending. GNR id/sensitivity pending, wbc down and afeb. · Contusion of lt knee, stable no fx by xray   ·   · Anxiety /depression on valium and Cymbalto at home; of record f  pt takes valium, not clear that this is a resonable option given falls and confusion. Will cont to hold home med. As to dx of anxiety depression, with the improved mental and the h/o frequent fall, would avoid minor tranquilizer as in diazepam and other class drugs , not given here. · T spine compression fx ? Age, T  and T 10  Non tender, discussed with , no rx indicated. Seem old. Code status: full   DVT prophylaxis: SCD's ordered pt not ruma, will start Heparin. Care Plan discussed with: Patient/Family, Nurse and   Disposition: Home w/Family and TBD     Subjective:     ROS            Denies cp, sob, n/v/d/f/chills, pt denies back pain , leg pain, pt easliy sits up and get oob with ass w/o pain. Could NOT obtain due to:        Objective:       PHYSICAL EXAM:  Vital signs below          Constitutional:   + acute distress  ,   +  Alert;         +  Conversant;      Eyes: anicteric sclerae, moist conjunctivae;    no  lid-lag;    + PERRLA;    Ears, nose,mouth,and throat:   Normal external ears ; oropharynx clear with  More mosit than prior day  mucous membranes; no posterior oral lesions; and  trach is   mid line;         Respiratory:    Trachea midline;  CTA    with normal   respiratory effort; and no    intercostal retractions;    Cardiovascular: RRR, no MRGs;    Gastrointestinal: Soft, non-tender  ; no   masses or HSM; no   guarding; no   rebound;         Musculoskelatal:  extreme ties are supple;  no   peripheral edema;  neck with   FROM; bruises tim inner thigh rt leg  Skin:  Temperature nl  ;  turgor is nl   ; the texture nl   ;  no   rash;     Neurologic: CN 2- 12   intact;  no   focal motor weakness;   ;    Psychiatric:  Appropriate   affect; alert   ;  Oriented better today ,recent events     Heme/Lymph/Immune:  Cervical adenopathy not   present; axillary adenopathy not   present;       VITALS:   Last 24hrs VS reviewed since prior progress note.  Most recent are:  Patient Vitals for the past 24 hrs:   Temp Pulse Resp BP SpO2   07/13/17 0835 97.6 °F (36.4 °C) 90 18 126/78 93 %   07/13/17 0112 97.6 °F (36.4 °C) 81 18 110/73 91 %   07/12/17 2044 97.8 °F (36.6 °C) 84 18 110/70 93 %   07/12/17 1432 98.1 °F (36.7 °C) 90 20 104/71 94 %       Intake/Output Summary (Last 24 hours) at 07/13/17 1053  Last data filed at 07/12/17 2221   Gross per 24 hour   Intake                0 ml   Output              500 ml   Net             -500 ml                ______________________________________________________________________  Medicines:    Current Facility-Administered Medications:     potassium chloride SR (KLOR-CON 10) tablet 40 mEq, 40 mEq, Oral, NOW, Gamaliel Millard MD    [START ON 7/14/2017] levoFLOXacin (LEVAQUIN) tablet 750 mg, 750 mg, Oral, ACB, Gamaliel Millard MD    acetaminophen (TYLENOL) tablet 650 mg, 650 mg, Oral, Q4H PRN, Gamaliel Millard MD    ondansetron [facility]) injection 4 mg, 4 mg, IntraVENous, Q6H PRN, Nevaeh Khalil MD, 4 mg at 07/11/17 0955    heparin (porcine) injection 5,000 Units, 5,000 Units, SubCUTAneous, Q8H, Gamaliel Millard MD, 5,000 Units at 07/13/17 0842    amLODIPine (NORVASC) tablet 10 mg, 10 mg, Oral, DAILY, Gregory Handley MD, 10 mg at 07/13/17 7587    cholecalciferol (VITAMIN D3) tablet 1,000 Units, 1,000 Units, Oral, DAILY, Gregory Handley MD, 1,000 Units at 07/13/17 0841    aspirin (ASPIRIN) tablet 325 mg, 325 mg, Oral, DAILY, Gregory Handley MD, 325 mg at 07/13/17 0841    0.9% sodium chloride infusion, 100 mL/hr, IntraVENous, CONTINUOUS, Alin Busch MD, Last Rate: 100 mL/hr at 07/13/17 0824, 100 mL/hr at 07/13/17 6251  Procedures: see electronic medical records for all procedures/Xrays and details which were not copied into this note but were reviewed prior to creation of Plan. LABS:  Recent Labs      07/13/17   0628  07/12/17   0353  07/10/17   1328   WBC  8.5  13.4*  15.3*   HGB  10.2*  10.9*  13.9   HCT  29.9*  32.7*  39.3   PLT  320  298  359     Recent Labs      07/13/17   0628  07/12/17   0353  07/11/17   0314   NA  138  134*  127*   K  3.1*  3.6  3.0*   CL  105  99  91*   CO2  24  26  23   BUN  10  15  16   CREA  0.59  0.87  0.69   GLU  80  81  142*   CA  8.6  9.0  8.9     Recent Labs      07/10/17   1328   SGOT  19   ALT  18   AP  109   TBILI  0.7   TP  8.9*   ALB  3.6   GLOB  5.3*     No results for input(s): INR, PTP, APTT in the last 72 hours. No lab exists for component: INREXT, INREXT   No results for input(s): FE, TIBC, PSAT, FERR in the last 72 hours. Lab Results   Component Value Date/Time    Folate 4.4 09/17/2015 11:06 AM      No results for input(s): PH, PCO2, PO2 in the last 72 hours. No results for input(s): PHI, PO2I, PCO2I in the last 72 hours.   Recent Labs      07/11/17   0314  07/10/17   1328   CPK   --   32   CKNDX   --   Cannot be calulated   TROIQ  <0.04  <0.04     Lab Results   Component Value Date/Time    Cholesterol, total 210 04/04/2017 10:49 AM    HDL Cholesterol 77 04/04/2017 10:49 AM    LDL, calculated 117 04/04/2017 10:49 AM    Triglyceride 79 04/04/2017 10:49 AM    CHOL/HDL Ratio 2.6 05/10/2010 09:42 AM     No results found for: [facility]  Lab Results   Component Value Date/Time    Color YELLOW/STRAW 07/10/2017 09:28 PM    Appearance CLEAR 07/10/2017 09:28 PM    Specific gravity 1.025 07/10/2017 09:28 PM    Specific gravity 1.026 04/22/2017 04:06 PM    pH (UA) 5.5 07/10/2017 09:28 PM    Protein TRACE 07/10/2017 09:28 PM    Glucose NEGATIVE  07/10/2017 09:28 PM    Ketone 40 07/10/2017 09:28 PM Bilirubin NEGATIVE  07/10/2017 09:28 PM    Urobilinogen 0.2 07/10/2017 09:28 PM    Nitrites POSITIVE 07/10/2017 09:28 PM    Leukocyte Esterase TRACE 07/10/2017 09:28 PM    Epithelial cells FEW 07/10/2017 09:28 PM    Bacteria 3+ 07/10/2017 09:28 PM    WBC 5-10 07/10/2017 09:28 PM    RBC 0-5 07/10/2017 09:28 PM           CULTURES:    No results found for: [facility] Lab Results   Component Value Date/Time    Culture result: KLEBSIELLA PNEUMONIAE 07/10/2017 09:28 PM        CT Results (most recent):    Results from [place] encounter on 07/10/17   CTA CHEST W OR W WO CONT   Narrative EXAM: CT ANGIOGRAPHY CHEST   INDICATION: Elevated d-dimer. COMPARISON: None. CONTRAST: 60 mL of Isovue-370. TECHNIQUE:   Precontrast  images were obtained to localize the volume for acquisition. Multislice helical CT arteriography was performed from the diaphragm to the  thoracic inlet during uneventful rapid bolus intravenous contrast  administration. Lung and soft tissue windows were generated. Coronal and  sagittal  reformatted images were also generated and 3D post processing  consisting of coronal maximum intensity projection images was performed. CT dose  reduction was achieved through use of a standardized protocol tailored for this  examination and automatic exposure control for dose modulation. Adaptive  statistical iterative reconstruction (ASIR) was utilized for this examination. FINDINGS:  LOWER NECK: The visualized portions of the thyroid and structures of the lower  neck are within normal limits. LUNGS: The lungs are clear of mass, nodule, airspace disease or edema. PLEURA: There is no pleural effusion or pneumothorax. PULMONARY ARTERIES: The pulmonary arteries are well enhanced and no pulmonary  emboli are identified. AORTA: The aorta is atherosclerotic and enhances without evidence of aneurysm or  dissection. There is coronary artery calcification.   MEDIASTINUM: There is no mediastinal or hilar adenopathy or mass. BONES AND SOFT TISSUES: There are compression fractures of T8 and T10.  UPPER ABDOMEN: The visualized portions of the upper abdominal organs are normal.         Impression IMPRESSION:   1. No pulmonary embolus. 2. Atherosclerosis with coronary artery calcification. 3. T8 and T10 compression fractures.             (Use of word \"will\" = I will, or I did, or done by me, or to be done by me)  Trudy Cody MD

## 2017-07-14 VITALS
BODY MASS INDEX: 23.29 KG/M2 | HEIGHT: 65 IN | HEART RATE: 93 BPM | OXYGEN SATURATION: 97 % | DIASTOLIC BLOOD PRESSURE: 89 MMHG | WEIGHT: 139.77 LBS | TEMPERATURE: 98.1 F | SYSTOLIC BLOOD PRESSURE: 125 MMHG | RESPIRATION RATE: 17 BRPM

## 2017-07-14 PROBLEM — R07.9 CHEST PAIN: Status: RESOLVED | Noted: 2017-07-10 | Resolved: 2017-07-14

## 2017-07-14 PROCEDURE — 74011250637 HC RX REV CODE- 250/637: Performed by: INTERNAL MEDICINE

## 2017-07-14 PROCEDURE — 74011250636 HC RX REV CODE- 250/636: Performed by: INTERNAL MEDICINE

## 2017-07-14 PROCEDURE — 65270000029 HC RM PRIVATE

## 2017-07-14 RX ORDER — POTASSIUM CHLORIDE 750 MG/1
40 TABLET, FILM COATED, EXTENDED RELEASE ORAL
Status: COMPLETED | OUTPATIENT
Start: 2017-07-14 | End: 2017-07-14

## 2017-07-14 RX ORDER — LEVOFLOXACIN 750 MG/1
750 TABLET ORAL
Qty: 3 TAB | Refills: 0 | Status: SHIPPED | OUTPATIENT
Start: 2017-07-14 | End: 2021-02-02 | Stop reason: ALTCHOICE

## 2017-07-14 RX ADMIN — ACETAMINOPHEN 650 MG: 325 TABLET, FILM COATED ORAL at 01:14

## 2017-07-14 RX ADMIN — HEPARIN SODIUM 5000 UNITS: 5000 INJECTION, SOLUTION INTRAVENOUS; SUBCUTANEOUS at 09:11

## 2017-07-14 RX ADMIN — SODIUM CHLORIDE 100 ML/HR: 900 INJECTION, SOLUTION INTRAVENOUS at 16:26

## 2017-07-14 RX ADMIN — HEPARIN SODIUM 5000 UNITS: 5000 INJECTION, SOLUTION INTRAVENOUS; SUBCUTANEOUS at 01:14

## 2017-07-14 RX ADMIN — AMLODIPINE BESYLATE 10 MG: 5 TABLET ORAL at 09:12

## 2017-07-14 RX ADMIN — VITAMIN D, TAB 1000IU (100/BT) 1000 UNITS: 25 TAB at 09:12

## 2017-07-14 RX ADMIN — POTASSIUM CHLORIDE 40 MEQ: 750 TABLET, FILM COATED, EXTENDED RELEASE ORAL at 15:30

## 2017-07-14 RX ADMIN — LEVOFLOXACIN 750 MG: 750 TABLET, FILM COATED ORAL at 06:39

## 2017-07-14 RX ADMIN — ASPIRIN 325 MG: 325 TABLET ORAL at 09:11

## 2017-07-14 NOTE — DISCHARGE INSTRUCTIONS
Discharge Instructions       PATIENT ID: Rafael Blackman  MRN: [id number]   YOB: 1950    DATE OF ADMISSION: 7/10/2017  2:54 PM    DATE OF DISCHARGE: 7/14/2017    PRIMARY CARE PROVIDER: Sheyla Leo DO     ATTENDING PHYSICIAN: Christi Trevino MD  DISCHARGING PROVIDER: Christi Trevino MD    To contact this individual call [phone] and ask the  to page. If unavailable ask to be transferred the Adult Hospitalist Department. DISCHARGE DIAGNOSES see dc note     CONSULTATIONS: ED CONSULT TO SENIOR SERVICES CASE MANAGEMENT  ED CONSULT TO SENIOR SERVICES PHYSICAL THERAPY  IP CONSULT TO HOSPITALIST  IP CONSULT TO CARDIOLOGY    PROCEDURES/SURGERIES: * No surgery found *    PENDING TEST RESULTS:   At the time of discharge the following test results are still pending: na    FOLLOW UP APPOINTMENTS:   Follow-up Information     Follow up With Details Comments Contact Info    Sheyla Leo DO In 1 week have taken pt off Valium, HCTZ and Cymbalta 2n2 fall, multiple and confusion. as well as dehydration. [address] [place] [address]  [phone]             ADDITIONAL CARE RECOMMENDATIONS: na    DIET: Cardiac Diet     ACTIVITY: Activity as tolerated    WOUND CARE: na    EQUIPMENT needed: na      DISCHARGE MEDICATIONS:   See Medication Reconciliation Form    · It is important that you take the medication exactly as they are prescribed. · Keep your medication in the bottles provided by the pharmacist and keep a list of the medication names, dosages, and times to be taken in your wallet. · Do not take other medications without consulting your doctor. NOTIFY YOUR PHYSICIAN FOR ANY OF THE FOLLOWING:   Fever over 101 degrees for 24 hours. Chest pain, shortness of breath, fever, chills, nausea, vomiting, diarrhea, change in mentation, falling, weakness, bleeding. Severe pain or pain not relieved by medications.   Or, any other signs or symptoms that you may have questions about.      DISPOSITION:    Home With:   OT  PT  HH  RN      x SNF/Inpatient Rehab/LTAC    Independent/assisted living    Hospice    Other:         Signed:   Melba Boucher MD  7/14/2017  3:01 PM

## 2017-07-14 NOTE — DISCHARGE SUMMARY
Discharge Summary       PATIENT ID: Shonda Koroma  MRN: [id number]   YOB: 1950    DATE OF ADMISSION: 7/10/2017  2:54 PM    DATE OF DISCHARGE: 2017    PRIMARY CARE PROVIDER: Meera Cantor DO     ATTENDING PHYSICIAN: Mariam Anderson MD   DISCHARGING PROVIDER: Mariam Anderson MD    To contact this individual call [phone] and ask the  to page. If unavailable ask to be transferred the Adult Hospitalist Department. CONSULTATIONS: ED CONSULT TO SENIOR SERVICES CASE MANAGEMENT  ED CONSULT TO SENIOR SERVICES PHYSICAL THERAPY  IP CONSULT TO HOSPITALIST  IP CONSULT TO CARDIOLOGY    PROCEDURES/SURGERIES: * No surgery found *    ADMITTING [address] [place] [address] COURSE:       Note: pt taken off her Valium, Cymbalta and her HCTZ 2n2 to falls and confusion and dehydration. Family advised of above, would avoid if at all can these meds or similar. Else:     Per recent PN:     PCP: Meera Cantor DO   In Hospital Procedure:   * No surgery found *  Consultants this Hospitalization:   ED CONSULT TO [facility] CASE MANAGEMENT  ED CONSULT TO SENIOR SERVICES PHYSICAL THERAPY  IP CONSULT TO HOSPITALIST  IP CONSULT TO CARDIOLOGY     In Hospital Procedure:   * No surgery found *            NAME:  Shonda Koroma        :  1961  MRN:  [id number]        Admission Summary:      Multiple falls, cp on admission.       Chief Complaint for 2017 ; and pertinent  Interval history / Subjective:          2017 concern and/or interventions/findings; additions to assess/plans below. · Pt up with assist with me and , no pain, steps in place poorly  But will move forward a few steps. PT feels SnF as of 2017 last note, being eval for REhab as well.    ·  notes that pt requires  assist.  Hopeful that rehab session will help  · Changing iv meds to po,   · Pt w/o pain issues again, no need to eval further the compression fx t 8  , 10 as old by clinical presentaion. Pt did not have pain in legs/knees though both bruised. · As to dx of anxiety depression, with the improved mental and the h/o frequent fall, would avoid minor tranquilizer as in diazepam and other class drugs , not given here.                                   · Metabolic encephalopathy /Confusion, worst in setting of uti and dehydration, improved numbers lab wise and pt at baseline 7/13/2017   · Multiple fall, bruises noted tim rt inner thigh,  No fx on knee xray,   Multi-body MS discomforts. Needs inpt SNF/rehab seems  Working on this placement with case/ agrees  · HTN, cont on home amlodipine  But dc home diuretic  Tim on discharge to not continue. .   · Hypokalemia, replaced. Home med as to diuril continued effect playing a role, not on this now,   · Hyponatremia, suspect total body sodium down, 2n2 dehydration, f/u lab in am - improved 7/13/2017   · Dehydration , replenish isotonically and f/u lab ; iv to be dc'd 7/13/2017      Dc home diuretic, would allow some lower ext edema chronically   · UTI, cult pending. GNR id/sensitivity pending, wbc down and afeb. · Contusion of lt knee, stable no fx by xray   ·    · Anxiety /depression on valium and Cymbalto at home; of record f  pt takes valium, not clear that this is a resonable option given falls and confusion. Will cont to hold home med. As to dx of anxiety depression, with the improved mental and the h/o frequent fall, would avoid minor tranquilizer as in diazepam and other class drugs , not given here. · T spine compression fx apparently old, asymptomatic on deep palpation and no pain on sit up /stand up efforts. . ? Age, T  and T 10  Non tender, discussed with , no rx indicated.  Seem old.       Code status: full   DVT prophylaxis: SCD's ordered pt not ruma, will start Heparin.      Care Plan discussed with: Patient/Family, Nurse and   Disposition: Home w/Family and TBD              DISCHARGE Kateryna League / PLAN: 1.  as above        PENDING TEST RESULTS:   At the time of discharge the following test results are still pending: na    FOLLOW UP APPOINTMENTS:    Follow-up Information     Follow up With Details Comments Contact Info    Brandyn Moura DO In 1 week have taken pt off Valium, HCTZ and Cymbalta 2n2 fall, multiple and confusion. as well as dehydration. [address] [facility]  [phone]             ADDITIONAL CARE RECOMMENDATIONS: na    DIET: Cardiac Diet     ACTIVITY: Activity as tolerated    WOUND CARE: na    EQUIPMENT needed: na      DISCHARGE MEDICATIONS:  Current Discharge Medication List      START taking these medications    Details   levoFLOXacin (LEVAQUIN) 750 mg tablet Take 1 Tab by mouth Daily (before breakfast). Qty: 3 Tab, Refills: 0         CONTINUE these medications which have NOT CHANGED    Details   glucosam-chond-hyalu-CF borate (MOVE FREE [facility]) 750 mg-100 mg- 1.65 mg-108 mg tab Take 1 Tab by mouth daily. amLODIPine (NORVASC) 10 mg tablet Take 1 Tab by mouth daily. Qty: 90 Tab, Refills: 3    Associated Diagnoses: Memory change; Essential hypertension      cholecalciferol (VITAMIN D3) 1,000 unit tablet Take 1,000 Units by mouth daily. aspirin (ASPIRIN) 325 mg tablet Take 325 mg by mouth daily. STOP taking these medications       naproxen sodium (ALEVE) 220 mg tablet Comments:   Reason for Stopping:         DULoxetine (CYMBALTA) 60 mg capsule Comments:   Reason for Stopping:         hydroCHLOROthiazide (HYDRODIURIL) 25 mg tablet Comments:   Reason for Stopping:         diazePAM (VALIUM) 5 mg tablet Comments:   Reason for Stopping:         potassium 99 mg tablet Comments:   Reason for Stopping:                 NOTIFY YOUR PHYSICIAN FOR ANY OF THE FOLLOWING:   Fever over 101 degrees for 24 hours. Chest pain, shortness of breath, fever, chills, nausea, vomiting, diarrhea, change in mentation, falling, weakness, bleeding.  Severe pain or pain not relieved by medications. Or, any other signs or symptoms that you may have questions about. DISPOSITION:    Home With:   OT  PT  HH  RN      x Long term SNF/Inpatient Rehab    Independent/assisted living    Hospice    Other:       PATIENT CONDITION AT DISCHARGE:     Functional status    Poor    x Deconditioned     Independent      Cognition     Lucid    x Forgetful     Dementia      Catheters/lines (plus indication)    [place]     PICC     PEG    x None      Code status   x  Full code     DNR      PHYSICAL EXAMINATION AT DISCHARGE:   Refer to Progress Note  Pul: clear  CV: rr, no gallop  Abd: supple non tender nl bs's   Ext: supple no significnat edema.   Visit Vitals    /78 (BP 1 Location: Left arm, BP Patient Position: At rest)    Pulse 90    Temp 98.5 °F (36.9 °C)    Resp 18    Ht 5' 5\" (1.651 m)    Wt 63.4 kg (139 lb 12.4 oz)    SpO2 94%    BMI 23.26 kg/m2           CHRONIC MEDICAL DIAGNOSES:  Problem List as of 7/14/2017  Date Reviewed: 7/14/2017          Codes Class Noted - Resolved    Advanced care planning/counseling discussion ICD-10-CM: Z71.89  ICD-9-CM: V65.49  5/2/2017 - Present        Frequent falls ICD-10-CM: R29.6  ICD-9-CM: V15.88  4/4/2017 - Present        Neurological disorder ICD-10-CM: G98.8  ICD-9-CM: 349.9  2/3/2017 - Present        Sleep disorder, unspecified ICD-10-CM: G47.9  ICD-9-CM: 780.50  1/9/2017 - Present        Fidgeting ICD-10-CM: R45.89  ICD-9-CM: 799.29  1/9/2017 - Present        Gait difficulty ICD-10-CM: R26.9  ICD-9-CM: 781.2  1/8/2016 - Present        Cerebral atrophy, mild ICD-10-CM: G31.9  ICD-9-CM: 331.9  1/8/2016 - Present        Weakness generalized ICD-10-CM: R53.1  ICD-9-CM: 780.79  1/8/2016 - Present        Trochanteric bursitis ICD-10-CM: M70.60  ICD-9-CM: 726.5  11/18/2014 - Present        Gluteal tendinitis ICD-10-CM: M76.00  ICD-9-CM: 726.5  11/18/2014 - Present        Right buttock pain ICD-10-CM: M79.1  ICD-9-CM: 729.1  10/21/2014 - Present Right leg pain ICD-10-CM: M79.604  ICD-9-CM: 729.5  10/21/2014 - Present        Right LBP ICD-10-CM: M54.5  ICD-9-CM: 724.2  4/1/2014 - Present        Sacroiliac joint dysfunction of right side ICD-10-CM: M53.3  ICD-9-CM: 724.6  4/1/2014 - Present        Onychomycosis of toenail ICD-10-CM: B35.1  ICD-9-CM: 110.1  10/15/2012 - Present        Chronic leg pain ICD-10-CM: M79.606, G89.29  ICD-9-CM: 729.5, 338.29  10/15/2012 - Present        Vitamin D deficiency ICD-10-CM: E55.9  ICD-9-CM: 268.9  3/26/2012 - Present        HTN (hypertension) (Chronic) ICD-10-CM: I10  ICD-9-CM: 401.9  12/16/2009 - Present        Hypokalemia (Chronic) ICD-10-CM: E87.6  ICD-9-CM: 276.8  12/16/2009 - Present        CAD (coronary artery disease) ICD-10-CM: I25.10  ICD-9-CM: 414.00  12/16/2009 - Present        Hypercholesteremia ICD-10-CM: E78.00  ICD-9-CM: 272.0  12/16/2009 - Present        RESOLVED: Chest pain ICD-10-CM: R07.9  ICD-9-CM: 786.50  7/10/2017 - 7/14/2017              Greater than  30  minutes were spent with the patient on counseling and coordination of care    Signed:   Tiffany Ramírez MD  7/14/2017  3:02 PM

## 2017-07-14 NOTE — PROGRESS NOTES
CM continuing to follow for d/c planning needs. CM spoke with patient's spouse Leda Carreon) on yesterday. The patient goes to adult day care 8:30am to 4:30pm on Tues-Fri. The  transports patient to and from adult . Sat-Mon he is at home as primary caregiver for the patient. Plan of care discussed in interdisciplinary rounds today. Therapy notes reviewed and rehab is recommended. CM met with patient today and encouraged her to increase her participation when therapy staff visit. Patient agreed to do so. CM spoke with patient's  (Sulma Garnett while in the room with patient and both would like patient evaluated for inpatient rehab at [facility]. Referral sent via [employer]. JULIANNA Kerns    4:18p  Liaisons from [facility] have visitid patient/spouse to discuss their rehab program and services. Patient also evaluated by [facility] Marc Scott, liaison). Patient does not meet criteria for inpatient rehab. Liaison will come to patient's room to discuss decision  With patient/family. Patient has commercial insurance insurance and will need insurance authorization. CM anticipated decision with waldo Ferrer and facility has started authorization. Outcome pending.   TAMIKO White, JULIANNA

## 2017-07-14 NOTE — PROGRESS NOTES
Bedside shift change report given to Chelsy Cook (oncoming nurse) by Toni Santos (offgoing nurse). Report included the following information SBAR, Intake/Output, MAR and Recent Results.

## 2017-08-29 ENCOUNTER — HOME HEALTH ADMISSION (OUTPATIENT)
Dept: HOME HEALTH SERVICES | Facility: HOME HEALTH | Age: 67
End: 2017-08-29
Payer: COMMERCIAL

## 2017-08-30 ENCOUNTER — HOME CARE VISIT (OUTPATIENT)
Dept: SCHEDULING | Facility: HOME HEALTH | Age: 67
End: 2017-08-30

## 2017-08-31 ENCOUNTER — HOME CARE VISIT (OUTPATIENT)
Dept: HOME HEALTH SERVICES | Facility: HOME HEALTH | Age: 67
End: 2017-08-31

## 2017-09-06 ENCOUNTER — HOME CARE VISIT (OUTPATIENT)
Dept: SCHEDULING | Facility: HOME HEALTH | Age: 67
End: 2017-09-06
Payer: COMMERCIAL

## 2017-09-06 VITALS
OXYGEN SATURATION: 95 % | TEMPERATURE: 98 F | DIASTOLIC BLOOD PRESSURE: 80 MMHG | HEART RATE: 95 BPM | RESPIRATION RATE: 16 BRPM | SYSTOLIC BLOOD PRESSURE: 124 MMHG

## 2017-09-06 PROCEDURE — G0151 HHCP-SERV OF PT,EA 15 MIN: HCPCS

## 2017-09-06 PROCEDURE — 400013 HH SOC

## 2017-09-07 ENCOUNTER — HOME CARE VISIT (OUTPATIENT)
Dept: SCHEDULING | Facility: HOME HEALTH | Age: 67
End: 2017-09-07
Payer: COMMERCIAL

## 2017-09-07 VITALS
HEART RATE: 98 BPM | SYSTOLIC BLOOD PRESSURE: 130 MMHG | TEMPERATURE: 98.4 F | OXYGEN SATURATION: 96 % | DIASTOLIC BLOOD PRESSURE: 88 MMHG

## 2017-09-07 PROCEDURE — G0152 HHCP-SERV OF OT,EA 15 MIN: HCPCS

## 2017-09-08 ENCOUNTER — HOME CARE VISIT (OUTPATIENT)
Dept: SCHEDULING | Facility: HOME HEALTH | Age: 67
End: 2017-09-08
Payer: COMMERCIAL

## 2017-09-08 VITALS
RESPIRATION RATE: 16 BRPM | HEART RATE: 98 BPM | OXYGEN SATURATION: 96 % | DIASTOLIC BLOOD PRESSURE: 80 MMHG | SYSTOLIC BLOOD PRESSURE: 130 MMHG

## 2017-09-08 PROCEDURE — G0151 HHCP-SERV OF PT,EA 15 MIN: HCPCS

## 2017-09-13 ENCOUNTER — HOME CARE VISIT (OUTPATIENT)
Dept: SCHEDULING | Facility: HOME HEALTH | Age: 67
End: 2017-09-13
Payer: COMMERCIAL

## 2017-09-13 VITALS
SYSTOLIC BLOOD PRESSURE: 124 MMHG | RESPIRATION RATE: 16 BRPM | DIASTOLIC BLOOD PRESSURE: 80 MMHG | OXYGEN SATURATION: 96 % | HEART RATE: 80 BPM

## 2017-09-13 PROCEDURE — G0151 HHCP-SERV OF PT,EA 15 MIN: HCPCS

## 2017-09-15 ENCOUNTER — HOME CARE VISIT (OUTPATIENT)
Dept: SCHEDULING | Facility: HOME HEALTH | Age: 67
End: 2017-09-15
Payer: COMMERCIAL

## 2017-09-15 VITALS
DIASTOLIC BLOOD PRESSURE: 80 MMHG | HEART RATE: 90 BPM | SYSTOLIC BLOOD PRESSURE: 130 MMHG | RESPIRATION RATE: 16 BRPM | OXYGEN SATURATION: 97 %

## 2017-09-15 PROCEDURE — G0151 HHCP-SERV OF PT,EA 15 MIN: HCPCS

## 2017-09-20 ENCOUNTER — HOME CARE VISIT (OUTPATIENT)
Dept: HOME HEALTH SERVICES | Facility: HOME HEALTH | Age: 67
End: 2017-09-20
Payer: COMMERCIAL

## 2017-09-22 ENCOUNTER — HOME CARE VISIT (OUTPATIENT)
Dept: SCHEDULING | Facility: HOME HEALTH | Age: 67
End: 2017-09-22
Payer: COMMERCIAL

## 2017-09-22 VITALS
DIASTOLIC BLOOD PRESSURE: 80 MMHG | HEART RATE: 90 BPM | OXYGEN SATURATION: 96 % | RESPIRATION RATE: 16 BRPM | SYSTOLIC BLOOD PRESSURE: 128 MMHG

## 2017-09-22 PROCEDURE — G0151 HHCP-SERV OF PT,EA 15 MIN: HCPCS

## 2019-06-03 ENCOUNTER — HOSPITAL ENCOUNTER (OUTPATIENT)
Dept: MRI IMAGING | Age: 69
Discharge: HOME | End: 2019-06-03
Attending: ORTHOPAEDIC SURGERY
Payer: MEDICARE

## 2019-06-03 DIAGNOSIS — M54.16 LUMBAR RADICULOPATHY: ICD-10-CM

## 2019-06-03 PROCEDURE — 72148 MRI LUMBAR SPINE W/O DYE: CPT

## 2021-02-02 ENCOUNTER — OFFICE VISIT (OUTPATIENT)
Dept: INTERNAL MEDICINE CLINIC | Age: 71
End: 2021-02-02
Payer: MEDICARE

## 2021-02-02 ENCOUNTER — HOSPITAL ENCOUNTER (OUTPATIENT)
Dept: LAB | Age: 71
Discharge: HOME | End: 2021-02-02
Payer: MEDICARE

## 2021-02-02 VITALS
TEMPERATURE: 98.1 F | WEIGHT: 132.4 LBS | HEART RATE: 97 BPM | RESPIRATION RATE: 16 BRPM | BODY MASS INDEX: 22.06 KG/M2 | HEIGHT: 65 IN | SYSTOLIC BLOOD PRESSURE: 116 MMHG | OXYGEN SATURATION: 99 % | DIASTOLIC BLOOD PRESSURE: 60 MMHG

## 2021-02-02 DIAGNOSIS — D50.9 IRON DEFICIENCY ANEMIA, UNSPECIFIED IRON DEFICIENCY ANEMIA TYPE: ICD-10-CM

## 2021-02-02 DIAGNOSIS — G20 PARKINSON'S SYNDROME (HCC): Primary | ICD-10-CM

## 2021-02-02 DIAGNOSIS — K21.9 GASTROESOPHAGEAL REFLUX DISEASE WITHOUT ESOPHAGITIS: ICD-10-CM

## 2021-02-02 DIAGNOSIS — F32.A CHRONIC DEPRESSION: ICD-10-CM

## 2021-02-02 DIAGNOSIS — R53.82 CHRONIC FATIGUE: ICD-10-CM

## 2021-02-02 DIAGNOSIS — R26.81 GAIT INSTABILITY: ICD-10-CM

## 2021-02-02 DIAGNOSIS — Z00.00 MEDICARE ANNUAL WELLNESS VISIT, SUBSEQUENT: ICD-10-CM

## 2021-02-02 DIAGNOSIS — Z98.890 H/O SHOULDER SURGERY: ICD-10-CM

## 2021-02-02 DIAGNOSIS — F41.9 ANXIETY: ICD-10-CM

## 2021-02-02 PROCEDURE — 80053 COMPREHEN METABOLIC PANEL: CPT

## 2021-02-02 PROCEDURE — 3017F COLORECTAL CA SCREEN DOC REV: CPT | Performed by: INTERNAL MEDICINE

## 2021-02-02 PROCEDURE — G8752 SYS BP LESS 140: HCPCS | Performed by: INTERNAL MEDICINE

## 2021-02-02 PROCEDURE — 99204 OFFICE O/P NEW MOD 45 MIN: CPT | Performed by: INTERNAL MEDICINE

## 2021-02-02 PROCEDURE — G8432 DEP SCR NOT DOC, RNG: HCPCS | Performed by: INTERNAL MEDICINE

## 2021-02-02 PROCEDURE — G8754 DIAS BP LESS 90: HCPCS | Performed by: INTERNAL MEDICINE

## 2021-02-02 PROCEDURE — G8536 NO DOC ELDER MAL SCRN: HCPCS | Performed by: INTERNAL MEDICINE

## 2021-02-02 PROCEDURE — 84443 ASSAY THYROID STIM HORMONE: CPT

## 2021-02-02 PROCEDURE — G8400 PT W/DXA NO RESULTS DOC: HCPCS | Performed by: INTERNAL MEDICINE

## 2021-02-02 PROCEDURE — G0439 PPPS, SUBSEQ VISIT: HCPCS | Performed by: INTERNAL MEDICINE

## 2021-02-02 PROCEDURE — G0463 HOSPITAL OUTPT CLINIC VISIT: HCPCS | Performed by: INTERNAL MEDICINE

## 2021-02-02 PROCEDURE — 36415 COLL VENOUS BLD VENIPUNCTURE: CPT

## 2021-02-02 PROCEDURE — 85027 COMPLETE CBC AUTOMATED: CPT

## 2021-02-02 PROCEDURE — 1101F PT FALLS ASSESS-DOCD LE1/YR: CPT | Performed by: INTERNAL MEDICINE

## 2021-02-02 PROCEDURE — 1090F PRES/ABSN URINE INCON ASSESS: CPT | Performed by: INTERNAL MEDICINE

## 2021-02-02 PROCEDURE — G8427 DOCREV CUR MEDS BY ELIG CLIN: HCPCS | Performed by: INTERNAL MEDICINE

## 2021-02-02 PROCEDURE — G8420 CALC BMI NORM PARAMETERS: HCPCS | Performed by: INTERNAL MEDICINE

## 2021-02-02 RX ORDER — SERTRALINE HYDROCHLORIDE 100 MG/1
100 TABLET, FILM COATED ORAL DAILY
Qty: 90 TAB | Refills: 1 | Status: SHIPPED | OUTPATIENT
Start: 2021-02-02 | End: 2021-05-24 | Stop reason: SDUPTHER

## 2021-02-02 RX ORDER — CARBIDOPA AND LEVODOPA 25; 250 MG/1; MG/1
1 TABLET ORAL 3 TIMES DAILY
COMMUNITY
End: 2021-02-02 | Stop reason: DRUGHIGH

## 2021-02-02 RX ORDER — CALCIUM CITRATE/VITAMIN D3 1000-10/30
LIQUID (ML) ORAL
COMMUNITY
End: 2021-11-08

## 2021-02-02 RX ORDER — ONDANSETRON 4 MG/1
4 TABLET, ORALLY DISINTEGRATING ORAL
COMMUNITY
End: 2021-02-02 | Stop reason: SDUPTHER

## 2021-02-02 RX ORDER — MELATONIN 10 MG
CAPSULE ORAL
COMMUNITY
End: 2021-07-26

## 2021-02-02 RX ORDER — ACETAMINOPHEN 500 MG
TABLET ORAL
COMMUNITY
End: 2021-11-16 | Stop reason: SDUPTHER

## 2021-02-02 RX ORDER — PANTOPRAZOLE SODIUM 40 MG/1
40 TABLET, DELAYED RELEASE ORAL DAILY
Qty: 90 TAB | Refills: 1 | Status: SHIPPED | OUTPATIENT
Start: 2021-02-02 | End: 2022-02-05 | Stop reason: SDUPTHER

## 2021-02-02 RX ORDER — ONDANSETRON 4 MG/1
4 TABLET, ORALLY DISINTEGRATING ORAL
Qty: 30 TAB | Refills: 5 | Status: SHIPPED | OUTPATIENT
Start: 2021-02-02 | End: 2021-05-24 | Stop reason: SDUPTHER

## 2021-02-02 RX ORDER — LANOLIN ALCOHOL/MO/W.PET/CERES
CREAM (GRAM) TOPICAL
COMMUNITY
End: 2021-09-07

## 2021-02-02 RX ORDER — CARBIDOPA AND LEVODOPA 25; 100 MG/1; MG/1
TABLET ORAL
Qty: 275 TAB | Refills: 5 | Status: SHIPPED | OUTPATIENT
Start: 2021-02-02 | End: 2021-04-01 | Stop reason: ALTCHOICE

## 2021-02-02 RX ORDER — SENNOSIDES 8.6 MG/1
1 TABLET ORAL DAILY
COMMUNITY
End: 2021-09-07

## 2021-02-02 RX ORDER — UREA 10 %
100 LOTION (ML) TOPICAL DAILY
COMMUNITY
End: 2021-09-07

## 2021-02-02 RX ORDER — PANTOPRAZOLE SODIUM 40 MG/1
40 TABLET, DELAYED RELEASE ORAL DAILY
COMMUNITY
End: 2021-02-02 | Stop reason: SDUPTHER

## 2021-02-02 RX ORDER — BISMUTH SUBSALICYLATE 262 MG
1 TABLET,CHEWABLE ORAL DAILY
COMMUNITY
End: 2021-09-07

## 2021-02-02 RX ORDER — FLUOXETINE HYDROCHLORIDE 20 MG/1
CAPSULE ORAL DAILY
COMMUNITY
End: 2021-02-02 | Stop reason: ALTCHOICE

## 2021-02-02 NOTE — PROGRESS NOTES
Schedule of Personalized Health Plan  (Provide Copy to Patient)  The best way to stay healthy is to live a healthy lifestyle. A healthy lifestyle includes regular exercise, eating a well-balanced diet, keeping a healthy weight and not smoking. Regular physical exams and screening tests are another important way to take care of yourself. Preventive exams provided by health care providers can find health problems early when treatment works best and can keep you from getting certain diseases or illnesses. Preventive services include exams, lab tests, screenings, shots, monitoring and information to help you take care of your own health. All people over 65 should have a pneumonia shot. Pneumonia shots are usually only needed once in a lifetime unless your doctor decides differently. All people over 65 should have a yearly flu shot. People over 65 are at medium to high risk for Hepatitis B. Three shots are needed for complete protection. In addition to your physical exam, some screening tests are recommended:    Bone mass measurement (dexa scan) is recommended every two years  Diabetes Mellitus screening is recommended every year. Glaucoma is an eye disease caused by high pressure in the eye. An eye exam is recommended every year. Cardiovascular screening tests that check your cholesterol and other blood fat (lipid) levels are recommended every five years. Colorectal Cancer screening tests help to find pre-cancerous polyps (growths in the colon) so they can be removed before they turn into cancer. Tests ordered for screening depend on your personal and family history risk factors.     Screening for Breast Cancer is recommended yearly with a mammogram.    Screening for Cervical Cancer is recommended every two years (annually for certain risk factors, such as previous history of STD or abnormal PAP in past 7 years), with a Pelvic Exam with PAP    Here is a list of your current Health Maintenance items with a due date:  Health Maintenance   Topic Date Due    Hepatitis C Screening  1950    COVID-19 Vaccine (1 of 2) 02/24/1967    DTaP/Tdap/Td series (1 - Tdap) 02/24/1972    Shingrix Vaccine Age 50> (1 of 2) 02/24/2001    Colorectal Cancer Screening Combo  02/24/2001    GLAUCOMA SCREENING Q2Y  02/24/2016    Pneumococcal 65+ years (1 of 1 - PPSV23) 02/24/2016    Breast Cancer Screen Mammogram  04/08/2018    Flu Vaccine (1) 09/01/2020    Medicare Yearly Exam  02/03/2022    Lipid Screen  04/04/2022    Bone Densitometry (Dexa) Screening  Addressed

## 2021-02-02 NOTE — PROGRESS NOTES
Health Maintenance Due   Topic Date Due    Hepatitis C Screening  1950    COVID-19 Vaccine (1 of 2) 02/24/1967    DTaP/Tdap/Td series (1 - Tdap) 02/24/1972    Shingrix Vaccine Age 50> (1 of 2) 02/24/2001    Colorectal Cancer Screening Combo  02/24/2001    GLAUCOMA SCREENING Q2Y  02/24/2016    Pneumococcal 65+ years (1 of 1 - PPSV23) 02/24/2016    Breast Cancer Screen Mammogram  04/08/2018    Medicare Yearly Exam  10/29/2018    Flu Vaccine (1) 09/01/2020       Chief Complaint   Patient presents with    Complete Physical       1. Have you been to the ER, urgent care clinic since your last visit? Hospitalized since your last visit? Yes When: [facility] Where: [facility]  Reason for visit: DIZZINESS, HIGH BP, HEART RATE WAS UP    2. Have you seen or consulted any other health care providers outside of the [address] [place] [address] since your last visit? Include any pap smears or colon screening. No    3) Do you have an Advance Directive on file? no    4) Are you interested in receiving information on Advance Directives? NO      Patient is accompanied by self I have received verbal consent from Kush Spencer to discuss any/all medical information while they are present in the room.

## 2021-02-03 LAB
ALBUMIN SERPL-MCNC: 4.6 G/DL (ref 3.8–4.8)
ALBUMIN/GLOB SERPL: 1.8 {RATIO} (ref 1.2–2.2)
ALP SERPL-CCNC: 91 IU/L (ref 39–117)
ALT SERPL-CCNC: 4 IU/L (ref 0–32)
AST SERPL-CCNC: 16 IU/L (ref 0–40)
BILIRUB SERPL-MCNC: 0.4 MG/DL (ref 0–1.2)
BUN SERPL-MCNC: 11 MG/DL (ref 8–27)
BUN/CREAT SERPL: 15 (ref 12–28)
CALCIUM SERPL-MCNC: 9.8 MG/DL (ref 8.7–10.3)
CHLORIDE SERPL-SCNC: 91 MMOL/L (ref 96–106)
CO2 SERPL-SCNC: 23 MMOL/L (ref 20–29)
CREAT SERPL-MCNC: 0.72 MG/DL (ref 0.57–1)
ERYTHROCYTE [DISTWIDTH] IN BLOOD BY AUTOMATED COUNT: 14.7 % (ref 11.7–15.4)
GLOBULIN SER CALC-MCNC: 2.6 G/DL (ref 1.5–4.5)
GLUCOSE SERPL-MCNC: 88 MG/DL (ref 65–99)
HCT VFR BLD AUTO: 40.5 % (ref 34–46.6)
HGB BLD-MCNC: 13.9 G/DL (ref 11.1–15.9)
MCH RBC QN AUTO: 28.4 PG (ref 26.6–33)
MCHC RBC AUTO-ENTMCNC: 34.3 G/DL (ref 31.5–35.7)
MCV RBC AUTO: 83 FL (ref 79–97)
PLATELET # BLD AUTO: 348 X10E3/UL (ref 150–450)
POTASSIUM SERPL-SCNC: 4.5 MMOL/L (ref 3.5–5.2)
PROT SERPL-MCNC: 7.2 G/DL (ref 6–8.5)
RBC # BLD AUTO: 4.89 X10E6/UL (ref 3.77–5.28)
SODIUM SERPL-SCNC: 127 MMOL/L (ref 134–144)
TSH SERPL DL<=0.005 MIU/L-ACNC: 1.68 UIU/ML (ref 0.45–4.5)
WBC # BLD AUTO: 9.6 X10E3/UL (ref 3.4–10.8)

## 2021-02-08 NOTE — PROGRESS NOTES
Low sodium.   May be related to excessive water drinking and/or medications  All other labs are normal

## 2021-02-14 NOTE — PROGRESS NOTES
HISTORY OF PRESENT ILLNESS  Murray Brunner is a 72 y.o. female. Patient comes in with her  after 3-1/2 years to reestablish care. She has a few chronic medical issues including Parkinson's syndrome, chronic anxiety depression, GERD, anemia, chronic fatigue, gait instability, chronic back pain. Lumbar MRI showed mild arthritis. Gait is unsteady. Has had falls off and on. Has seen multiple specialists over the years. Followed by neurologist.  On Sinemet five times daily. Previous brain MRI showed atrophy and microvascular changes. Reports being dizzy and groggy at times. Reports chronic low energy and fatigue. Not very active physically. Appetite is fair. Depends on  for some ADLs. She is not driving. Denies tobacco or alcohol use. Has chronic right shoulder pain with previous surgery. Reports she is tired of taking medications. Wonders if some can be stopped.  thinks a lot of her problems are related to depression/anxiety and panic. Has chronic GERD with occasional nausea. Protonix helps. Needs medication refills. Most recent labs from 2017 look stable. Due for repeat labs. Denies any signs or symptoms of COVID-19. HPI    Review of Systems   Constitutional: Positive for malaise/fatigue. Negative for weight loss. HENT: Negative. Eyes: Negative for blurred vision. Respiratory: Negative for shortness of breath. Cardiovascular: Negative for chest pain and leg swelling. Gastrointestinal: Negative for abdominal pain. Genitourinary: Negative for dysuria and frequency. Musculoskeletal: Positive for falls and joint pain. Negative for back pain. Skin: Negative. Neurological: Positive for dizziness, focal weakness (R leg) and weakness. Negative for sensory change and headaches. Psychiatric/Behavioral: Positive for depression and memory loss. Negative for hallucinations, substance abuse and suicidal ideas. The patient is nervous/anxious.  The patient does not have insomnia. All other systems reviewed and are negative. Physical Exam  Vitals signs and nursing note reviewed. Constitutional:       General: She is not in acute distress (with pain). Appearance: She is well-developed. Comments: Pleasant lady  Using a cane   HENT:      Head: Normocephalic and atraumatic. Right Ear: Tympanic membrane normal.      Left Ear: Tympanic membrane normal.      Nose: Nose normal.      Mouth/Throat:      Mouth: Mucous membranes are moist.      Pharynx: Oropharynx is clear. Eyes:      General: No scleral icterus. Conjunctiva/sclera: Conjunctivae normal.   Neck:      Musculoskeletal: Normal range of motion and neck supple. No neck rigidity. Thyroid: No thyromegaly. Vascular: No carotid bruit or JVD. Cardiovascular:      Rate and Rhythm: Normal rate and regular rhythm. Pulses: Normal pulses. Heart sounds: Normal heart sounds. No murmur. Pulmonary:      Effort: Pulmonary effort is normal. No respiratory distress. Breath sounds: Normal breath sounds. No wheezing or rales. Abdominal:      General: Bowel sounds are normal. There is no distension. Palpations: Abdomen is soft. Tenderness: There is no abdominal tenderness. Musculoskeletal:         General: Tenderness (R hip/leg/shoulder, chronic) present. No deformity or signs of injury. Right lower leg: No edema. Left lower leg: No edema. Comments: djd  Weak legs with muscle atrophy   Lymphadenopathy:      Cervical: No cervical adenopathy. Skin:     General: Skin is warm and dry. Findings: No rash. Neurological:      Mental Status: She is alert and oriented to person, place, and time. Cranial Nerves: No cranial nerve deficit.       Coordination: Coordination abnormal.      Gait: Gait abnormal.   Psychiatric:         Behavior: Behavior normal.      Comments: Seems anxious and depressed         ASSESSMENT and PLAN  Diagnoses and all orders for this visit:    1. Parkinson's syndrome ([facility]  -     METABOLIC PANEL, COMPREHENSIVE; Future  -     CBC W/O DIFF; Future  -     TSH 3RD GENERATION; Future    2. Anxiety    3. Chronic depression    4. Gastroesophageal reflux disease without esophagitis    5. Iron deficiency anemia, unspecified iron deficiency anemia type    6. Gait instability    7. H/O shoulder surgery    8. Chronic fatigue    9. Medicare annual wellness visit, subsequent    Other orders  -     sertraline (ZOLOFT) 100 mg tablet; Take 1 Tab by mouth daily. -     carbidopa-levodopa (Sinemet)  mg per tablet; 1.5 five times dailt  -     pantoprazole (PROTONIX) 40 mg tablet; Take 1 Tab by mouth daily. -     ondansetron (ZOFRAN ODT) 4 mg disintegrating tablet; Take 1 Tab by mouth every eight (8) hours as needed for Nausea or Vomiting.  -     METABOLIC PANEL, COMPREHENSIVE  -     CBC W/O DIFF  -     TSH 3RD GENERATION      Follow-up and Dispositions    · Return in about 3 months (around 5/2/2021).      lab results and schedule of future lab studies reviewed with patient  reviewed diet, exercise and weight control  reviewed medications and side effects in detail  F/u with other MD's as scheduled  Fall precautions discussed  Explained to patient that her anxiety and depression contributes to most of her symptoms  Reassurance that I do not see any significant new physical findings on her exam  COVID-19 precautions discussed with pt

## 2021-02-16 ENCOUNTER — TELEPHONE (OUTPATIENT)
Dept: INTERNAL MEDICINE CLINIC | Age: 71
End: 2021-02-16

## 2021-02-16 DIAGNOSIS — G89.29 CHRONIC PAIN OF RIGHT LOWER EXTREMITY: ICD-10-CM

## 2021-02-16 DIAGNOSIS — G20 PARKINSON'S SYNDROME (HCC): Primary | ICD-10-CM

## 2021-02-16 DIAGNOSIS — M79.18 RIGHT BUTTOCK PAIN: ICD-10-CM

## 2021-02-16 DIAGNOSIS — M53.3 SACROILIAC JOINT DYSFUNCTION OF RIGHT SIDE: ICD-10-CM

## 2021-02-16 DIAGNOSIS — M79.604 CHRONIC PAIN OF RIGHT LOWER EXTREMITY: ICD-10-CM

## 2021-02-16 DIAGNOSIS — R29.6 FREQUENT FALLS: ICD-10-CM

## 2021-02-16 DIAGNOSIS — M79.604 RIGHT LEG PAIN: ICD-10-CM

## 2021-02-16 DIAGNOSIS — M70.61 TROCHANTERIC BURSITIS OF RIGHT HIP: ICD-10-CM

## 2021-02-16 DIAGNOSIS — G98.8 NEUROLOGICAL DISORDER: ICD-10-CM

## 2021-02-16 DIAGNOSIS — R26.81 GAIT INSTABILITY: ICD-10-CM

## 2021-02-16 DIAGNOSIS — Z98.890 H/O SHOULDER SURGERY: ICD-10-CM

## 2021-02-16 NOTE — TELEPHONE ENCOUNTER
Gilford Reason, a physical therapist, needs new orders for patient for physical therapy. Her phone number is [phone] and her fax is [phone]

## 2021-02-26 ENCOUNTER — VIRTUAL VISIT (OUTPATIENT)
Dept: INTERNAL MEDICINE CLINIC | Age: 71
End: 2021-02-26
Payer: MEDICARE

## 2021-02-26 DIAGNOSIS — R26.81 GAIT INSTABILITY: ICD-10-CM

## 2021-02-26 DIAGNOSIS — G31.9 CEREBRAL ATROPHY, MILD (HCC): ICD-10-CM

## 2021-02-26 DIAGNOSIS — G20 PARKINSON'S SYNDROME (HCC): ICD-10-CM

## 2021-02-26 DIAGNOSIS — E87.1 HYPONATREMIA: ICD-10-CM

## 2021-02-26 DIAGNOSIS — I10 ESSENTIAL HYPERTENSION: Primary | Chronic | ICD-10-CM

## 2021-02-26 DIAGNOSIS — F41.9 ANXIETY: ICD-10-CM

## 2021-02-26 PROCEDURE — G8400 PT W/DXA NO RESULTS DOC: HCPCS | Performed by: INTERNAL MEDICINE

## 2021-02-26 PROCEDURE — 1101F PT FALLS ASSESS-DOCD LE1/YR: CPT | Performed by: INTERNAL MEDICINE

## 2021-02-26 PROCEDURE — G8756 NO BP MEASURE DOC: HCPCS | Performed by: INTERNAL MEDICINE

## 2021-02-26 PROCEDURE — G8536 NO DOC ELDER MAL SCRN: HCPCS | Performed by: INTERNAL MEDICINE

## 2021-02-26 PROCEDURE — 99214 OFFICE O/P EST MOD 30 MIN: CPT | Performed by: INTERNAL MEDICINE

## 2021-02-26 PROCEDURE — G8427 DOCREV CUR MEDS BY ELIG CLIN: HCPCS | Performed by: INTERNAL MEDICINE

## 2021-02-26 PROCEDURE — G9717 DOC PT DX DEP/BP F/U NT REQ: HCPCS | Performed by: INTERNAL MEDICINE

## 2021-02-26 PROCEDURE — G0463 HOSPITAL OUTPT CLINIC VISIT: HCPCS | Performed by: INTERNAL MEDICINE

## 2021-02-26 PROCEDURE — 3017F COLORECTAL CA SCREEN DOC REV: CPT | Performed by: INTERNAL MEDICINE

## 2021-02-26 PROCEDURE — G8420 CALC BMI NORM PARAMETERS: HCPCS | Performed by: INTERNAL MEDICINE

## 2021-02-26 PROCEDURE — 1090F PRES/ABSN URINE INCON ASSESS: CPT | Performed by: INTERNAL MEDICINE

## 2021-02-26 RX ORDER — BUSPIRONE HYDROCHLORIDE 15 MG/1
15 TABLET ORAL 3 TIMES DAILY
COMMUNITY
End: 2021-02-26 | Stop reason: SDUPTHER

## 2021-02-26 RX ORDER — BUSPIRONE HYDROCHLORIDE 15 MG/1
15 TABLET ORAL 3 TIMES DAILY
Qty: 90 TAB | Refills: 5 | Status: SHIPPED | OUTPATIENT
Start: 2021-02-26 | End: 2021-05-27 | Stop reason: SDUPTHER

## 2021-02-26 NOTE — PROGRESS NOTES
Health Maintenance Due   Topic Date Due    Hepatitis C Screening  1950    COVID-19 Vaccine (1 of 2) 02/24/1967    DTaP/Tdap/Td series (1 - Tdap) 02/24/1972    Shingrix Vaccine Age 50> (1 of 2) 02/24/2001    Colorectal Cancer Screening Combo  02/24/2001    GLAUCOMA SCREENING Q2Y  02/24/2016    Pneumococcal 65+ years (1 of 1 - PPSV23) 02/24/2016    Breast Cancer Screen Mammogram  04/08/2018    Flu Vaccine (1) 09/01/2020       Chief Complaint   Patient presents with    Nausea    Fall    Hypertension       1. Have you been to the ER, urgent care clinic since your last visit? Hospitalized since your last visit? No    2. Have you seen or consulted any other health care providers outside of the [address] [place] [address] since your last visit? Include any pap smears or colon screening. No    3) Do you have an Advance Directive on file? no    4) Are you interested in receiving information on Advance Directives? NO      Patient is accompanied by  I have received verbal consent from Jeremias Reyes to discuss any/all medical information while they are present in the room.

## 2021-02-26 NOTE — PROGRESS NOTES
Елена Alston (: 1950) is a 80 y.o. female, established patient, here for evaluation of the following chief complaint(s):   Nausea, Fall, and Hypertension       ASSESSMENT/PLAN:  1. Essential hypertension  Start metoprolol 25 mg twice daily. Patient has 50 mg pills at home. 2. Parkinson's syndrome ([address]    3. Cerebral atrophy, mild ([address]    4. Gait instability    5. Anxiety  Refill BuSpar for 6 months  6. Hyponatremia      SUBJECTIVE/OBJECTIVE:  Pt. is seen virtually with her  for f/u. Has a few chronic medical issues as documented. Reports BP being very high. Continues to have dizziness and gait instability. No recent falls. No headaches or focal neurological issues. Has taken metoprolol in the past.  Has issues with depression and anxiety. We increased Zoloft recently. Also on BuSpar 3 times daily. On Sinemet for Parkinson's syndrome. Has been followed by neurologist.  Previous brain imaging showed cerebellar atrophy. Also has chronic hyponatremia. Reports drinking a lot of water. Tries to drink Gatorade also. Taking precautions for Covid 19. Denies any related signs or symptoms including fever, cough, SOB or CP. PMH/PSH/Allergies/Social History/medication list and most recent studies reviewed with patient. Recent labs are significant for sodium 127. The rest of the CMP, CBC and TSH are normal.    Tobacco use: No  Alcohol use: Social    Reports compliance with medications and diet. Trying to be active physically to control weight. Reports no other new c/o. Plan:  I think anxiety is contributing to her symptoms. Tells me she felt better when taking Valium a few years ago.   Continue current medications  Restart metoprolol 25 mg twice daily  Monitor BP at home with goal of 140/90 or less  Watch diet and remain active physically as tolerated  Discussed etiology and treatment of low sodium with patient and   Advised patient to limit water intake  Follow-up with other MDs/specialists as scheduled  COVID-19 precautions discussed with pt  Follow-up 1 week or as needed        Physical Exam    [INSTRUCTIONS:  \"[x]\" Indicates a positive item  \"[]\" Indicates a negative item  -- DELETE ALL ITEMS NOT EXAMINED]    Constitutional: [x] Appears well-developed and well-nourished [x] No apparent distress      [] Abnormal -     Mental status: [x] Alert and awake  [x] Oriented to person/place/time [x] Able to follow commands    [] Abnormal -     Eyes:   EOM    [x]  Normal    [] Abnormal -   Sclera  [x]  Normal    [] Abnormal -          Discharge [x]  None visible   [] Abnormal -     HENT: [x] Normocephalic, atraumatic  [] Abnormal -   [x] Mouth/Throat: Mucous membranes are moist    External Ears [x] Normal  [] Abnormal -    Neck: [x] No visualized mass [] Abnormal -     Pulmonary/Chest: [x] Respiratory effort normal   [x] No visualized signs of difficulty breathing or respiratory distress        [] Abnormal -      Musculoskeletal:   [x] Normal gait with no signs of ataxia         [x] Normal range of motion of neck        [] Abnormal -     Neurological:        [x] No Facial Asymmetry (Cranial nerve 7 motor function) (limited exam due to video visit)          [x] No gaze palsy        [] Abnormal -          Skin:        [x] No significant exanthematous lesions or discoloration noted on facial skin         [] Abnormal -            Psychiatric:       [x] Normal Affect [] Abnormal -        [x] No Hallucinations    Other pertinent observable physical exam findings:-        On this date 02/26/21 I have spent 25 minutes reviewing previous notes, test results and face to face (virtual) with the patient discussing the diagnosis and importance of compliance with the treatment plan as well as documenting on the day of the visit. Currie Harm is being evaluated by a Virtual Visit (video visit) encounter to address concerns as mentioned above. A caregiver was present when appropriate.  Due to this being a TeleHealth encounter (During RTXQI-08 public health emergency), evaluation of the following organ systems was limited: Vitals/Constitutional/EENT/Resp/CV/GI//MS/Neuro/Skin/Heme-Lymph-Imm. Pursuant to the emergency declaration under the [address] [place] [address], [address] [place] [address] and the [employer] and [employer] Act, this Virtual Visit was conducted with patient's (and/or legal guardian's) consent, to reduce the patient's risk of exposure to COVID-19 and provide necessary medical care. The patient (and/or legal guardian) has also been advised to contact this office for worsening conditions or problems, and seek emergency medical treatment and/or call 911 if deemed necessary. Patient identification was verified at the start of the visit: YES    Services were provided through a video synchronous discussion virtually to substitute for in-person clinic visit. Patient was located at home and provider was located in office or at home. An electronic signature was used to authenticate this note.   -- Sanya Veliz, DO

## 2021-03-01 ENCOUNTER — VIRTUAL VISIT (OUTPATIENT)
Dept: INTERNAL MEDICINE CLINIC | Age: 71
End: 2021-03-01
Payer: MEDICARE

## 2021-03-01 DIAGNOSIS — G20 PARKINSON'S SYNDROME (HCC): Primary | ICD-10-CM

## 2021-03-01 DIAGNOSIS — R26.81 GAIT INSTABILITY: ICD-10-CM

## 2021-03-01 DIAGNOSIS — I10 ESSENTIAL HYPERTENSION: ICD-10-CM

## 2021-03-01 DIAGNOSIS — F41.9 ANXIETY: ICD-10-CM

## 2021-03-01 DIAGNOSIS — G31.9 CEREBRAL ATROPHY, MILD (HCC): ICD-10-CM

## 2021-03-01 PROCEDURE — 1090F PRES/ABSN URINE INCON ASSESS: CPT | Performed by: INTERNAL MEDICINE

## 2021-03-01 PROCEDURE — G0463 HOSPITAL OUTPT CLINIC VISIT: HCPCS | Performed by: INTERNAL MEDICINE

## 2021-03-01 PROCEDURE — 1101F PT FALLS ASSESS-DOCD LE1/YR: CPT | Performed by: INTERNAL MEDICINE

## 2021-03-01 PROCEDURE — G8420 CALC BMI NORM PARAMETERS: HCPCS | Performed by: INTERNAL MEDICINE

## 2021-03-01 PROCEDURE — G8400 PT W/DXA NO RESULTS DOC: HCPCS | Performed by: INTERNAL MEDICINE

## 2021-03-01 PROCEDURE — G9717 DOC PT DX DEP/BP F/U NT REQ: HCPCS | Performed by: INTERNAL MEDICINE

## 2021-03-01 PROCEDURE — G8427 DOCREV CUR MEDS BY ELIG CLIN: HCPCS | Performed by: INTERNAL MEDICINE

## 2021-03-01 PROCEDURE — 3017F COLORECTAL CA SCREEN DOC REV: CPT | Performed by: INTERNAL MEDICINE

## 2021-03-01 PROCEDURE — G8536 NO DOC ELDER MAL SCRN: HCPCS | Performed by: INTERNAL MEDICINE

## 2021-03-01 PROCEDURE — G8756 NO BP MEASURE DOC: HCPCS | Performed by: INTERNAL MEDICINE

## 2021-03-01 PROCEDURE — 99214 OFFICE O/P EST MOD 30 MIN: CPT | Performed by: INTERNAL MEDICINE

## 2021-03-01 RX ORDER — LOSARTAN POTASSIUM 50 MG/1
50 TABLET ORAL DAILY
Qty: 30 TAB | Refills: 1 | Status: SHIPPED | OUTPATIENT
Start: 2021-03-01 | End: 2021-04-03

## 2021-03-01 NOTE — PROGRESS NOTES
Ashley Weir (: 1950) is a 80 y.o. female, established patient, here for evaluation of the following chief complaint(s):   Hypertension, Coronary Artery Disease, and Parkinsons Disease       ASSESSMENT/PLAN:  1. Parkinson's syndrome ([address]  -     REFERRAL TO NEUROLOGY    2. Essential hypertension  Start losartan 50 mg 1 daily  Stop metoprolol  Monitor BP at home with goal of 140/90 or less    3. Cerebral atrophy, mild ([address]    4. Gait instability    5. Anxiety    6. Hyponatremia  Limit free water consumption  Explained to patient this could be related to medications as well        Return in about 2 weeks (around 3/15/2021). SUBJECTIVE/OBJECTIVE:  Pt. is seen virtually with her  for f/u. Has a few chronic medical issues as documented. Reports BP still being high at home. We started metoprolol 25 mg twice daily last week. Reports having side effects of dizziness although she took it previously. She has chronic gait issues. Has had frequent falls. Has been diagnosed with Parkinson's syndrome by neurologist.  Has not seen one in a while. Not sure if Sinemet is helping. Also has chronic depression and anxiety. Previous brain imaging showed cerebral atrophy. Lives with her . Depends on  for some ADLs. Taking precautions for Covid 19. Denies any related signs or symptoms including fever, cough, SOB or CP. PMH/PSH/Allergies/Social History/medication list and most recent studies reviewed with patient. Recent labs are all stable except for sodium 127. Tobacco use: No  Alcohol use: No    Reports compliance with medications and diet. Trying to be active physically to control weight. Reports no other new c/o.     Plan:  Continue current medications  We will get a second neurological opinion  Watch diet and remain active physically as tolerated  Fall precautions discussed  Follow-up with other MDs/specialists as scheduled  COVID-19 precautions discussed with pt  Follow-up 2 weeks or as needed        Physical Exam    [INSTRUCTIONS:  \"[x]\" Indicates a positive item  \"[]\" Indicates a negative item  -- DELETE ALL ITEMS NOT EXAMINED]    Constitutional: [x] Appears well-developed and well-nourished [x] No apparent distress      [] Abnormal -     Mental status: [x] Alert and awake  [x] Oriented to person/place/time [x] Able to follow commands    [] Abnormal -     Eyes:   EOM    [x]  Normal    [] Abnormal -   Sclera  [x]  Normal    [] Abnormal -          Discharge [x]  None visible   [] Abnormal -     HENT: [x] Normocephalic, atraumatic  [] Abnormal -   [x] Mouth/Throat: Mucous membranes are moist    External Ears [x] Normal  [] Abnormal -    Neck: [x] No visualized mass [] Abnormal -     Pulmonary/Chest: [x] Respiratory effort normal   [x] No visualized signs of difficulty breathing or respiratory distress        [] Abnormal -      Musculoskeletal:   [x] Normal gait with no signs of ataxia         [x] Normal range of motion of neck        [] Abnormal -     Neurological:        [x] No Facial Asymmetry (Cranial nerve 7 motor function) (limited exam due to video visit)          [x] No gaze palsy        [] Abnormal -          Skin:        [x] No significant exanthematous lesions or discoloration noted on facial skin         [] Abnormal -            Psychiatric:       [x] Normal Affect [] Abnormal -        [x] No Hallucinations    Other pertinent observable physical exam findings:-        On this date 03/01/2021 I have spent 25 minutes reviewing previous notes, test results and face to face (virtual) with the patient discussing the diagnosis and importance of compliance with the treatment plan as well as documenting on the day of the visit. Niraj Son, was evaluated through a synchronous (real-time) audio-video encounter. The patient (or guardian if applicable) is aware that this is a billable service. Verbal consent to proceed has been obtained within the past 12 months.  The visit was conducted pursuant to the emergency declaration under the [facility], [address] [place] [address] authority and the [email or web address] Act. Patient identification was verified, and a caregiver was present when appropriate. The patient was located in a state where the provider was credentialed to provide care. An electronic signature was used to authenticate this note.   -- Kolton Mccall, DO

## 2021-03-01 NOTE — PROGRESS NOTES
Health Maintenance Due   Topic Date Due    Hepatitis C Screening  1950    COVID-19 Vaccine (1 of 2) 02/24/1967    DTaP/Tdap/Td series (1 - Tdap) 02/24/1972    Shingrix Vaccine Age 50> (1 of 2) 02/24/2001    Colorectal Cancer Screening Combo  02/24/2001    GLAUCOMA SCREENING Q2Y  02/24/2016    Pneumococcal 65+ years (1 of 1 - PPSV23) 02/24/2016    Breast Cancer Screen Mammogram  04/08/2018    Flu Vaccine (1) 09/01/2020       Chief Complaint   Patient presents with    Hypertension    Coronary Artery Disease    Parkinsons Disease       1. Have you been to the ER, urgent care clinic since your last visit? Hospitalized since your last visit? No    2. Have you seen or consulted any other health care providers outside of the [address] [place] [address] since your last visit? Include any pap smears or colon screening. No    3) Do you have an Advance Directive on file? no    4) Are you interested in receiving information on Advance Directives? NO      Patient is accompanied by  I have received verbal consent from Severa Loan to discuss any/all medical information while they are present in the room.

## 2021-03-04 PROBLEM — Z00.00 MEDICARE ANNUAL WELLNESS VISIT, SUBSEQUENT: Status: RESOLVED | Noted: 2017-05-02 | Resolved: 2021-03-04

## 2021-04-01 ENCOUNTER — OFFICE VISIT (OUTPATIENT)
Dept: NEUROLOGY | Age: 71
End: 2021-04-01
Payer: MEDICARE

## 2021-04-01 VITALS
SYSTOLIC BLOOD PRESSURE: 118 MMHG | BODY MASS INDEX: 21.99 KG/M2 | DIASTOLIC BLOOD PRESSURE: 72 MMHG | HEIGHT: 65 IN | WEIGHT: 132 LBS | HEART RATE: 60 BPM | RESPIRATION RATE: 16 BRPM | OXYGEN SATURATION: 92 %

## 2021-04-01 DIAGNOSIS — G20 PARKINSON DISEASE (HCC): Primary | ICD-10-CM

## 2021-04-01 PROCEDURE — G8536 NO DOC ELDER MAL SCRN: HCPCS | Performed by: PSYCHIATRY & NEUROLOGY

## 2021-04-01 PROCEDURE — G8420 CALC BMI NORM PARAMETERS: HCPCS | Performed by: PSYCHIATRY & NEUROLOGY

## 2021-04-01 PROCEDURE — 3017F COLORECTAL CA SCREEN DOC REV: CPT | Performed by: PSYCHIATRY & NEUROLOGY

## 2021-04-01 PROCEDURE — G8400 PT W/DXA NO RESULTS DOC: HCPCS | Performed by: PSYCHIATRY & NEUROLOGY

## 2021-04-01 PROCEDURE — G8754 DIAS BP LESS 90: HCPCS | Performed by: PSYCHIATRY & NEUROLOGY

## 2021-04-01 PROCEDURE — 99205 OFFICE O/P NEW HI 60 MIN: CPT | Performed by: PSYCHIATRY & NEUROLOGY

## 2021-04-01 PROCEDURE — G8752 SYS BP LESS 140: HCPCS | Performed by: PSYCHIATRY & NEUROLOGY

## 2021-04-01 PROCEDURE — G8427 DOCREV CUR MEDS BY ELIG CLIN: HCPCS | Performed by: PSYCHIATRY & NEUROLOGY

## 2021-04-01 PROCEDURE — 1101F PT FALLS ASSESS-DOCD LE1/YR: CPT | Performed by: PSYCHIATRY & NEUROLOGY

## 2021-04-01 PROCEDURE — 1090F PRES/ABSN URINE INCON ASSESS: CPT | Performed by: PSYCHIATRY & NEUROLOGY

## 2021-04-01 PROCEDURE — G9717 DOC PT DX DEP/BP F/U NT REQ: HCPCS | Performed by: PSYCHIATRY & NEUROLOGY

## 2021-04-01 RX ORDER — LEVODOPA AND CARBIDOPA 145; 36.25 MG/1; MG/1
3 CAPSULE, EXTENDED RELEASE ORAL 3 TIMES DAILY
Qty: 90 CAP | Refills: 1 | Status: SHIPPED | OUTPATIENT
Start: 2021-04-01 | End: 2021-04-26 | Stop reason: SDUPTHER

## 2021-04-01 RX ORDER — QUETIAPINE FUMARATE 25 MG/1
25 TABLET, FILM COATED ORAL
COMMUNITY
End: 2021-05-24 | Stop reason: SDUPTHER

## 2021-04-01 NOTE — PROGRESS NOTES
Chief Complaint   Patient presents with    Tremors         HISTORY OF PRESENT ILLNESS  Federico Lynch is a 80 y.o. female  who was last seen by Dr. Estela Chance at this practice over 5 years ago. She is suspected to have Parkinson's disease and has also seen specialists at [facility]. Currently on now Sinemet 25/100, 1-1/2 tablet 5 times daily. She says that it helps her but the effect lasts only a couple of hours and then it wears off. She has been getting freezing episodes and has sustained few falls due to gait instability. Sometimes she will start walking very fast and just topples over. She has been having some pain in her lower extremities and sometimes her toes will tend to curl. She has had an overall slowness in mobility and it is hard for her to stand up from a seated position and cannot walk without a walker. She started having symptoms in 2015. She had difficulties with gait and balance, tremors in the extremities that got progressively worse over the years. She has had psychomotor retardation, softer voice, freezing while walking up and has sustained a few falls occasionally. Symptoms are worse on her right side. Needs assistance with ADLs and  manages her medications, helps her with what she needs      Past Medical History:   Diagnosis Date    CAD (coronary artery disease)     Hypertension     Hypokalaemia      Current Outpatient Medications   Medication Sig    QUEtiapine (SEROquel) 25 mg tablet Take 25 mg by mouth.  carbidopa-levodopa ER (Rytary) 36. mg per capsule Take 3 Caps by mouth three (3) times daily.  losartan (COZAAR) 50 mg tablet Take 1 Tab by mouth daily.  busPIRone (BUSPAR) 15 mg tablet Take 1 Tab by mouth three (3) times daily.  calcium citrate-vitamin D3 1,000 mg-10 mcg /30 mL liqd Take  by mouth.  acetaminophen (Tylenol Extra Strength) 500 mg tablet Take  by mouth every six (6) hours as needed for Pain.     cyanocobalamin (Vitamin B-12) 100 mcg tablet Take 100 mcg by mouth daily.  ferrous sulfate 325 mg (65 mg iron) tablet Take  by mouth Daily (before breakfast).  multivitamin (ONE A DAY) tablet Take 1 Tab by mouth daily.  senna (Senna) 8.6 mg tablet Take 1 Tab by mouth daily.  sertraline (ZOLOFT) 100 mg tablet Take 1 Tab by mouth daily.  pantoprazole (PROTONIX) 40 mg tablet Take 1 Tab by mouth daily.  ondansetron (ZOFRAN ODT) 4 mg disintegrating tablet Take 1 Tab by mouth every eight (8) hours as needed for Nausea or Vomiting.  cholecalciferol (VITAMIN D3) 1,000 unit tablet Take 1,000 Units by mouth daily.  melatonin 10 mg capsule Take  by mouth nightly. No current facility-administered medications for this visit. Allergies   Allergen Reactions    Pcn [Penicillins] Swelling     Family History   Problem Relation Age of Onset    Diabetes Mother      Social History     Tobacco Use    Smoking status: Former Smoker     Packs/day: 0.50     Quit date: 10/19/2011     Years since quittin.4    Smokeless tobacco: Never Used   Substance Use Topics    Alcohol use:  Yes     Alcohol/week: 1.0 - 2.0 standard drinks     Types: 1 - 2 Standard drinks or equivalent per week     Comment: one drink per week    Drug use: No     Past Surgical History:   Procedure Laterality Date    HX APPENDECTOMY      HX OOPHORECTOMY Right     HX SHOULDER REPLACEMENT      HX TONSILLECTOMY      IR DRAIN HEMATOMA SEROMA FLUID           REVIEW OF SYSTEMS  Review of Systems - History obtained from the patient  Psychological ROS: negative  ENT ROS: negative  Hematological and Lymphatic ROS: negative  Endocrine ROS: negative  Respiratory ROS: no cough, shortness of breath, or wheezing  Cardiovascular ROS: no chest pain or dyspnea on exertion  Gastrointestinal ROS: no abdominal pain, change in bowel habits, or black or bloody stools  Genito-Urinary ROS: no dysuria, trouble voiding, or hematuria  Musculoskeletal ROS: negative  Dermatological ROS: negative      PHYSICAL EXAMINATION:    Visit Vitals  /72   Pulse 60   Resp 16   Ht 5' 5\" (1.651 m)   Wt 132 lb (59.9 kg)   SpO2 92%   BMI 21.97 kg/m²     General: Thin appearing well groomed individual in no acute distress. Neck: Supple, nontender, no bruits, no pain with resistance to active range of motion. Heart: Regular rate and rhythm. Normal S1S2. Lungs:  Equal chest expansion, no cough, no wheeze  Musculoskeletal:  Extremities revealed no edema and had full range of motion of joints. Psych:  Good mood and bright affect    NEUROLOGICAL EXAMINATION:     Mental Status:   Alert and oriented to person, place, and time . Attention span and concentration are normal. Speech is fluent. Cranial Nerves:    II, III, IV, VI:  Visual acuity grossly intact. Visual fields are normal.    Pupils are equal, round, and reactive to light and accommodation. Extra-ocular movements are full and fluid. V-XII: Hearing is grossly intact. Facial features are symmetric, with normal sensation and strength. The palate rises symmetrically and the tongue protrudes midline. Sternocleidomastoids 5/5. Motor Examination: Normal tone, reduced bulk, and strength. 5/5 muscle strength throughout. Mild cogwheeling and rigidity noted during the upper extremities, right more than left. Sensory exam:  Normal throughout to pinprick, temperature, and vibration sense. Normal proprioception. Coordination:  Finger to nose and rapid arm movement testing was normal.   5 to 7 Hz pill-rolling type tremor noted in the right hand at rest.  Similar tremor noted in the left hand as well. Gait and Station: Needs assistance to stand up. Able to ambulate with the help of a walker with small/short steps. No shuffling or festination noted today     Reflexes:  DTRs 2+ throughout. Toes downgoing. LABS / IMAGING    MRI Results (maximum last 3):   Results from [place] encounter on 06/03/19   MRI LUMB SPINE WO CONT    Narrative EXAM:  MRI LUMB SPINE WO CONT    INDICATION:   Lumbar radiculopathy    COMPARISON: None. TECHNIQUE: Multiplanar multisequence acquisition without contrast of the lumbar  spine. CONTRAST: None. FINDINGS:  The last well-formed disk is designated as L5-S1 for the purpose of this report. Vertebral bodies were numbered using this convention. Mild retrolisthesis of L2 on L3. Alignment is otherwise within normal limits. Vertebral body heights are maintained without evidence of acute fracture. Marrow  signal is normal. A prominent Schmorl's node is noted in the right L4 superior  endplate. There is multilevel degenerative disc disease and facet arthropathy as  detailed below. The conus is normal in size and signal and terminates at L1. The  cauda equina is unremarkable. Visualized soft tissues are unremarkable. T12-L1: No significant disc herniation, spinal canal or neural foraminal  stenosis. L1-L2: Disc desiccation with minimal diffuse disc bulge. No significant spinal  canal or neural foraminal stenosis. L2-L3: Mild retrolisthesis. Disc desiccation with diffuse disc bulge, eccentric  to the left. No significant spinal canal stenosis. Mild bilateral neural  foraminal stenosis. L3-L4: Diffuse disc bulge. Moderate bilateral facet arthropathy. No significant  spinal canal stenosis. Mild left and no right neural foraminal stenosis. L4-L5: Diffuse disc bulge. Severe bilateral facet arthropathy. No significant  spinal canal stenosis. Mild left and no right neural foraminal stenosis. L5-S1: Diffuse disc bulge, eccentric to the left. Moderate bilateral facet  arthropathy. No significant spinal canal stenosis. Mild left and no right neural  foraminal stenosis. Impression IMPRESSION:    1. Mild degenerative changes as detailed above. No significant spinal canal  stenosis at any level and scattered mild neural foraminal stenoses.      Results from [facility] encounter on 09/02/15   MRI BRAIN W WO CONT    Narrative **Final Report**       ICD Codes / Adm. Diagnosis: 781.0  724.6 / Abnormal involuntary movements    Examination:  MR BRAIN W AND ALISTAIR CON  - [id number] - Sep  2 2015  9:31AM  Accession No:  [id number]  Reason:  65F with body stifness, rigidity. Attention to deep white   matter/basal ganglia      REPORT:  CLINICAL HISTORY: Stiffness, rigidity    INDICATION: 65F with body stifness, rigidity. Attention to deep white   matter/basal ganglia. COMPARISON: None    TECHNIQUE: MR examination of the brain includes axial and sagittal T1   pre-and-post contrast, axial T2, axial FLAIR, axial gradient echo, axial   DWI, coronal T1 postcontrast.    Contrast: The patient was administered 7 ML of gadolinium based contrast   material axial and coronal T1-weighted postcontrast enhanced imaging was   obtained. FINDINGS:     There is no intracranial mass. There is no intracranial hemorrhage. There is   no evidence of acute infarction. Confluent periventricular and scattered   abnormal foci of increased T2 signal intensity in the corona radiata and   centrum semiovale. Sulcal and ventricular prominence. Major intracranial   vascular flow-voids are grossly unremarkable. There is no abnormal parenchymal enhancement. There is no abnormal meningeal   enhancement. Cavernous sinuses are symmetric The brain architecture is   normal. There is no evidence of midline shift or mass-effect. .  There are   no extra-axial fluid collections. The mastoid air cells and paranasal sinuses are well pneumatized and clear. IMPRESSION:    Moderate cerebral atrophy for patient age. Findings consistent with mild chronic microvascular ischemic change. There is no intracranial mass, hemorrhage or evidence of acute infarction. No basal ganglia abnormality identified.               Signing/Reading Doctor: Winston Rice ([id number])    Approved: Winston Rice ([id number])  Sep  2 2015  9:37AM Results from Abstract encounter on 12/03/14   MRI LOW EXT JT RT WO CONT     Imaging reviewed    ASSESSMENT    ICD-10-CM ICD-9-CM    1. Parkinson disease ([facility] [address]  G20 332.0 carbidopa-levodopa ER (Rytary) 36. mg per capsule       DISCUSSION  Ms. Federico Lynch has some parkinsonian features for the past 5 to 6 years these have been slowly progressive  She has reduced mobility, gait instability/poor balance, rigidity in the extremities on exam and resting tremor. She has decreased facial expression, soft voice and walks with short small steps. She likely has underlying idiopathic Parkinson's disease  Fortunately she has been responsive to levodopa carbidopa combination. However, she is currently taking it 5 times a day and medication effect tends to last only 1.5 to 2 hours  We discussed alternative treatment options with Rytary and the mechanism by which it works and may provide a consistent benefit without any wearing off or freezing episodes. She will likely need to take 2 or 3 capsules 3 times a day. 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 Prescription will be submitted for authorization   Continue periodic follow-up and I will see her back in 3 to 4 months  Continue PT    Thank you for allowing me to participate in the care of Ms. Mac Overall. Please feel free to contact me if you have any questions. I will be happy to follow to follow her along with you.     Tito Zuniga MD  Diplomate, American Board of Psychiatry & Neurology (Neurology)  [place] Board of Psychiatry & Neurology (Clinical Neurophysiology)  Diplomate, American Board of Electrodiagnostic Medicine

## 2021-04-01 NOTE — PATIENT INSTRUCTIONS
[facility]   Statement to Patients  April 1, 2014      In an effort to ensure the large volume of patient prescription refills is processed in the most efficient and expeditious manner, we are asking our patients to assist us by calling your Pharmacy for all prescription refills, this will include also your  Mail Order Pharmacy. The pharmacy will contact our office electronically to continue the refill process. Please do not wait until the last minute to call your pharmacy. We need at least 48 hours (2days) to fill prescriptions. We also encourage you to call your pharmacy before going to  your prescription to make sure it is ready. With regard to controlled substance prescription refill requests (narcotic refills) that need to be picked up at our office, we ask your cooperation by providing us with at least 72 hours (3days) notice that you will need a refill. We will not refill narcotic prescription refill requests after 4:00pm on any weekday, Monday through Thursday, or after 2:00pm on Fridays, or on the weekends. We encourage everyone to explore another way of getting your prescription refill request processed using [employer] our patient web portal through our electronic medical record system. [employer] is an efficient and effective way to communicate your medication request directly to the office and  downloadable as an miguelito on your smart phone . [employer] also features a review functionality that allows you to view your medication list as well as leave messages for your physician. Are you ready to get connected? If so please review the attatched instructions or speak to any of our staff to get you set up right away! Thank you so much for your cooperation. Should you have any questions please contact our Practice Administrator.     The Physicians and Staff,  [facility]

## 2021-04-02 ENCOUNTER — TELEPHONE (OUTPATIENT)
Dept: NEUROLOGY | Age: 71
End: 2021-04-02

## 2021-04-02 NOTE — TELEPHONE ENCOUNTER
Patient's spouse reported patient had near syncope spell while at PT today. Her BP dropped to 70/50 and patient became dizzy and her eyes \"rolled back in her head\". BP is now 100/70. Patient has been on Carbidopa-Levodopa 25/100 1.5 tabs 5x daily and a sixth dose was started last night, per patient's spouse. I advised patient to push po fluids with electrolytes. Please advise.

## 2021-04-05 NOTE — TELEPHONE ENCOUNTER
Kemar Mendez - I just reviewed her chart. I would recommend she discuss decreasing her losartan with her PCP as a first step. If this fails, then Dr. Jaylen Haji may want to add a medication to help keep her BP high when she stands up.

## 2021-04-05 NOTE — TELEPHONE ENCOUNTER
With her condition, she is going to be very much at risk to have sudden blood pressure drops and when that occurs she needs to be placed on the ground flat immediately. Increase fluids especially with electrolytes for now. I would not change the medications right now.

## 2021-04-05 NOTE — TELEPHONE ENCOUNTER
Spoke with patient, informed her of 's message. She states she already is doing that, and drinks 10 bottles of a water a day and 3 gatorades daily as well. She wanted a message sent to the on call to see if there is anything else that can be done.

## 2021-04-23 ENCOUNTER — TELEPHONE (OUTPATIENT)
Dept: NEUROLOGY | Age: 71
End: 2021-04-23

## 2021-04-26 DIAGNOSIS — G20 PARKINSON DISEASE (HCC): ICD-10-CM

## 2021-04-26 RX ORDER — LEVODOPA AND CARBIDOPA 145; 36.25 MG/1; MG/1
3 CAPSULE, EXTENDED RELEASE ORAL 3 TIMES DAILY
Qty: 270 CAP | Refills: 3 | Status: SHIPPED | OUTPATIENT
Start: 2021-04-26 | End: 2021-07-20 | Stop reason: ALTCHOICE

## 2021-05-10 ENCOUNTER — VIRTUAL VISIT (OUTPATIENT)
Dept: INTERNAL MEDICINE CLINIC | Age: 71
End: 2021-05-10
Payer: MEDICARE

## 2021-05-10 DIAGNOSIS — M53.3 COCCYX PAIN: ICD-10-CM

## 2021-05-10 DIAGNOSIS — Z09 HOSPITAL DISCHARGE FOLLOW-UP: Primary | ICD-10-CM

## 2021-05-10 DIAGNOSIS — S70.01XA HEMATOMA OF RIGHT HIP, INITIAL ENCOUNTER: ICD-10-CM
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
## 2021-05-10 DIAGNOSIS — I95.1 ORTHOSTATIC HYPOTENSION: ICD-10-CM

## 2021-05-10 DIAGNOSIS — G20 PARKINSON'S SYNDROME (HCC): ICD-10-CM

## 2021-05-10 PROCEDURE — 1101F PT FALLS ASSESS-DOCD LE1/YR: CPT | Performed by: INTERNAL MEDICINE

## 2021-05-10 PROCEDURE — G8427 DOCREV CUR MEDS BY ELIG CLIN: HCPCS | Performed by: INTERNAL MEDICINE

## 2021-05-10 PROCEDURE — G9717 DOC PT DX DEP/BP F/U NT REQ: HCPCS | Performed by: INTERNAL MEDICINE

## 2021-05-10 PROCEDURE — 1090F PRES/ABSN URINE INCON ASSESS: CPT | Performed by: INTERNAL MEDICINE

## 2021-05-10 PROCEDURE — G8756 NO BP MEASURE DOC: HCPCS | Performed by: INTERNAL MEDICINE

## 2021-05-10 PROCEDURE — G8536 NO DOC ELDER MAL SCRN: HCPCS | Performed by: INTERNAL MEDICINE

## 2021-05-10 PROCEDURE — 99214 OFFICE O/P EST MOD 30 MIN: CPT | Performed by: INTERNAL MEDICINE

## 2021-05-10 PROCEDURE — G8420 CALC BMI NORM PARAMETERS: HCPCS | Performed by: INTERNAL MEDICINE

## 2021-05-10 PROCEDURE — 3017F COLORECTAL CA SCREEN DOC REV: CPT | Performed by: INTERNAL MEDICINE

## 2021-05-10 PROCEDURE — G8400 PT W/DXA NO RESULTS DOC: HCPCS | Performed by: INTERNAL MEDICINE

## 2021-05-10 PROCEDURE — G0463 HOSPITAL OUTPT CLINIC VISIT: HCPCS | Performed by: INTERNAL MEDICINE

## 2021-05-10 PROCEDURE — 1111F DSCHRG MED/CURRENT MED MERGE: CPT | Performed by: INTERNAL MEDICINE

## 2021-05-10 RX ORDER — TRAMADOL HYDROCHLORIDE 50 MG/1
50 TABLET ORAL
Qty: 20 TAB | Refills: 0 | Status: SHIPPED | OUTPATIENT
Start: 2021-05-10 | End: 2021-05-20

## 2021-05-10 RX ORDER — CARBIDOPA AND LEVODOPA 25; 100 MG/1; MG/1
TABLET ORAL
COMMUNITY
Start: 2020-08-11 | End: 2021-07-20 | Stop reason: ALTCHOICE

## 2021-05-10 NOTE — PROGRESS NOTES
Nash Tyson (: 1950) is a 80 y.o. female, established patient, here for evaluation of the following chief complaint(s):   Hospital Follow Up ( In [facility] doctors  x 1 month, then rehab, labs off. weakness)       ASSESSMENT/PLAN:  Below is the assessment and plan developed based on review of pertinent labs, studies, and medications. 1. Hospital discharge follow-up  -     OR DISCHARGE MEDS RECONCILED W/ CURRENT OUTPATIENT MED LIST  2. Hematoma of right hip, initial encounter  -     traMADoL (ULTRAM) 50 mg tablet; Take 1 Tab by mouth two (2) times daily as needed for Pain for up to 10 days. Max Daily Amount: 100 mg., Normal, Disp-20 Tab, R-0  3. Coccyx pain  -     traMADoL (ULTRAM) 50 mg tablet; Take 1 Tab by mouth two (2) times daily as needed for Pain for up to 10 days. Max Daily Amount: 100 mg., Normal, Disp-20 Tab, R-0  4. Fall, initial encounter  -     traMADoL (ULTRAM) 50 mg tablet; Take 1 Tab by mouth two (2) times daily as needed for Pain for up to 10 days. Max Daily Amount: 100 mg., Normal, Disp-20 Tab, R-0  5. Parkinson's syndrome ([address]  6. Orthostatic hypotension      Return in about 4 weeks (around 2021). SUBJECTIVE/OBJECTIVE:  Pt. is seen virtually with her  for f/u. Has a few chronic medical issues including Parkinson's syndrome with gait instability and HTN with orthostatic hypotension. Reports being hospitalized at [address] and then going to rehab with increased symptoms and gait issues. Evaluated by hospitalist and specialists. Some new medications have been added. Reports having a fall when in the hospital sustaining right hip pain with hematoma and tailbone pain. Reports x-rays being negative for fracture. Reports being very uncomfortable when sitting as well as standing. Tylenol is not helping much. Asking for stronger pain medication. Depends on her  for many ADLs. She is alone at home when her  goes to work.       I reviewed discharge med list from the hospital.  Has been started on Florinef for orthostatic hypotension. Other meds include BuSpar, Sinemet, vitamin D, diclofenac cream, glucosamine, Atarax, Zofran, KCl, Seroquel, Zoloft, sodium chloride pills. Taking precautions for Covid 19. Denies any related signs or symptoms including fever, cough, SOB or CP. PMH/PSH/Allergies/Social History/medication list and most recent studies reviewed with patient. Tobacco use: No  Alcohol use: No  Reports compliance with medications and diet. Reports no other new c/o. Plan:  Continue current medications although not sure why she was started on some of them. We will get records from [facility] to review  Start tramadol as needed pain. Potential risks and side effects discussed including falling and altered mental status.   Fall precautions discussed  Watch diet and remain active physically as tolerated  Follow-up with other MDs/specialists as scheduled  COVID-19 precautions discussed with pt  Follow-up 1 month or as needed      Physical Exam    [INSTRUCTIONS:  \"[x]\" Indicates a positive item  \"[]\" Indicates a negative item  -- DELETE ALL ITEMS NOT EXAMINED]    Constitutional: [x] Appears well-developed and well-nourished [x] No apparent distress      [] Abnormal -     Mental status: [x] Alert and awake  [x] Oriented to person/place/time [x] Able to follow commands    [] Abnormal -     Eyes:   EOM    [x]  Normal    [] Abnormal -   Sclera  [x]  Normal    [] Abnormal -          Discharge [x]  None visible   [] Abnormal -     HENT: [x] Normocephalic, atraumatic  [] Abnormal -   [x] Mouth/Throat: Mucous membranes are moist    External Ears [x] Normal  [] Abnormal -    Neck: [x] No visualized mass [] Abnormal -     Pulmonary/Chest: [x] Respiratory effort normal   [x] No visualized signs of difficulty breathing or respiratory distress        [] Abnormal -      Musculoskeletal:   [x] Normal gait with no signs of ataxia         [x] Normal range of motion of neck [] Abnormal -     Neurological:        [x] No Facial Asymmetry (Cranial nerve 7 motor function) (limited exam due to video visit)          [x] No gaze palsy        [] Abnormal -          Skin:        [x] No significant exanthematous lesions or discoloration noted on facial skin         [] Abnormal -            Psychiatric:       [x] Normal Affect [] Abnormal -        [x] No Hallucinations    Other pertinent observable physical exam findings:-        On this date 05/10/2021 I have spent 25 minutes reviewing previous notes, test results and face to face (virtual) with the patient discussing the diagnosis and importance of compliance with the treatment plan as well as documenting on the day of the visit. Pearlil Tuttle, was evaluated through a synchronous (real-time) audio-video encounter. The patient (or guardian if applicable) is aware that this is a billable service. Verbal consent to proceed has been obtained within the past 12 months. The visit was conducted pursuant to the emergency declaration under the [address] [place] [address], [address] [place] [address] authority and the [employer] Act. Patient identification was verified, and a caregiver was present when appropriate. The patient was located in a state where the provider was credentialed to provide care. An electronic signature was used to authenticate this note.   -- Eligio Quiroz DO

## 2021-05-10 NOTE — PROGRESS NOTES
Chief Complaint   Patient presents with   [facility] Follow Up      In [place]  x 1 month, then rehab, labs off. weakness     Getting fax of all new meds from .

## 2021-05-17 NOTE — TELEPHONE ENCOUNTER
Re: Molly Beasley request from nurse to start PA. Antoni Pryor also received form response from [address] [place] [address] asking for PA. .    [facility] Key[facility] [id number] [facility] and sent last progress notes. Pt has Malikpts plan and ID # [id number]    Scanned copy of letter from [address] [place] [address] to media if needed.

## 2021-05-18 NOTE — TELEPHONE ENCOUNTER
[facility] [place][note 2] PA approval through [facility]. Type of coverage is NON-FORMULARY    Effective 02/16/2021-05/17/2022.     Faxed approval update to [address] [place] [address], [address] [place] [address] Sandra Harris [phone]   Phone:  [phone]  Fax:  [phone]

## 2021-05-24 ENCOUNTER — VIRTUAL VISIT (OUTPATIENT)
Dept: INTERNAL MEDICINE CLINIC | Age: 71
End: 2021-05-24
Payer: MEDICARE

## 2021-05-24 DIAGNOSIS — E87.1 HYPONATREMIA: ICD-10-CM

## 2021-05-24 DIAGNOSIS — S70.01XD HEMATOMA OF RIGHT HIP, SUBSEQUENT ENCOUNTER: ICD-10-CM

## 2021-05-24 DIAGNOSIS — F51.04 CHRONIC INSOMNIA: ICD-10-CM

## 2021-05-24 DIAGNOSIS — R29.6 FREQUENT FALLS: ICD-10-CM

## 2021-05-24 DIAGNOSIS — I10 ESSENTIAL HYPERTENSION: Chronic | ICD-10-CM

## 2021-05-24 DIAGNOSIS — I95.1 ORTHOSTATIC HYPOTENSION: ICD-10-CM

## 2021-05-24 DIAGNOSIS — F41.9 ANXIETY: ICD-10-CM

## 2021-05-24 DIAGNOSIS — S30.0XXA CONTUSION OF COCCYX, INITIAL ENCOUNTER: Primary | ICD-10-CM

## 2021-05-24 DIAGNOSIS — G20 PARKINSON'S SYNDROME (HCC): ICD-10-CM

## 2021-05-24 DIAGNOSIS — F32.A CHRONIC DEPRESSION: ICD-10-CM

## 2021-05-24 DIAGNOSIS — R53.83 FATIGUE, UNSPECIFIED TYPE: ICD-10-CM

## 2021-05-24 DIAGNOSIS — E55.9 VITAMIN D DEFICIENCY: ICD-10-CM

## 2021-05-24 PROCEDURE — G8427 DOCREV CUR MEDS BY ELIG CLIN: HCPCS | Performed by: INTERNAL MEDICINE

## 2021-05-24 PROCEDURE — G8756 NO BP MEASURE DOC: HCPCS | Performed by: INTERNAL MEDICINE

## 2021-05-24 PROCEDURE — 3017F COLORECTAL CA SCREEN DOC REV: CPT | Performed by: INTERNAL MEDICINE

## 2021-05-24 PROCEDURE — G0463 HOSPITAL OUTPT CLINIC VISIT: HCPCS | Performed by: INTERNAL MEDICINE

## 2021-05-24 PROCEDURE — 1090F PRES/ABSN URINE INCON ASSESS: CPT | Performed by: INTERNAL MEDICINE

## 2021-05-24 PROCEDURE — 99215 OFFICE O/P EST HI 40 MIN: CPT | Performed by: INTERNAL MEDICINE

## 2021-05-24 PROCEDURE — G8400 PT W/DXA NO RESULTS DOC: HCPCS | Performed by: INTERNAL MEDICINE

## 2021-05-24 PROCEDURE — G8420 CALC BMI NORM PARAMETERS: HCPCS | Performed by: INTERNAL MEDICINE

## 2021-05-24 PROCEDURE — G8536 NO DOC ELDER MAL SCRN: HCPCS | Performed by: INTERNAL MEDICINE

## 2021-05-24 PROCEDURE — 1101F PT FALLS ASSESS-DOCD LE1/YR: CPT | Performed by: INTERNAL MEDICINE

## 2021-05-24 PROCEDURE — G9717 DOC PT DX DEP/BP F/U NT REQ: HCPCS | Performed by: INTERNAL MEDICINE

## 2021-05-24 RX ORDER — QUETIAPINE FUMARATE 25 MG/1
25 TABLET, FILM COATED ORAL
Qty: 90 TABLET | Refills: 1 | Status: SHIPPED | OUTPATIENT
Start: 2021-05-24 | End: 2021-07-07 | Stop reason: SDUPTHER

## 2021-05-24 RX ORDER — BUSPIRONE HYDROCHLORIDE 15 MG/1
15 TABLET ORAL 3 TIMES DAILY
Qty: 90 TABLET | Refills: 5 | Status: CANCELLED | OUTPATIENT
Start: 2021-05-24

## 2021-05-24 RX ORDER — CARBIDOPA AND LEVODOPA 25; 100 MG/1; MG/1
TABLET ORAL
Status: CANCELLED | OUTPATIENT
Start: 2021-05-24

## 2021-05-24 RX ORDER — LIDOCAINE 50 MG/G
PATCH TOPICAL
Qty: 10 EACH | Refills: 5 | Status: SHIPPED | OUTPATIENT
Start: 2021-05-24 | End: 2021-09-14 | Stop reason: SDUPTHER

## 2021-05-24 RX ORDER — POTASSIUM CHLORIDE 20 MEQ/1
20 TABLET, EXTENDED RELEASE ORAL 2 TIMES DAILY
Qty: 180 TABLET | Refills: 1 | Status: SHIPPED | OUTPATIENT
Start: 2021-05-24 | End: 2021-11-04

## 2021-05-24 RX ORDER — FLUDROCORTISONE ACETATE 0.1 MG/1
0.1 TABLET ORAL 2 TIMES DAILY
Qty: 180 TABLET | Refills: 1 | Status: SHIPPED | OUTPATIENT
Start: 2021-05-24 | End: 2021-07-07 | Stop reason: SDUPTHER

## 2021-05-24 RX ORDER — SERTRALINE HYDROCHLORIDE 100 MG/1
100 TABLET, FILM COATED ORAL DAILY
Qty: 90 TABLET | Refills: 1 | Status: SHIPPED | OUTPATIENT
Start: 2021-05-24 | End: 2021-11-08

## 2021-05-24 RX ORDER — ONDANSETRON 4 MG/1
4 TABLET, ORALLY DISINTEGRATING ORAL
Qty: 30 TABLET | Refills: 5 | Status: SHIPPED | OUTPATIENT
Start: 2021-05-24 | End: 2021-07-13 | Stop reason: SDUPTHER

## 2021-05-24 RX ORDER — POLYETHYLENE GLYCOL 3350 17 G/17G
17 POWDER, FOR SOLUTION ORAL 2 TIMES DAILY
Qty: 595 G | Refills: 1 | Status: SHIPPED | OUTPATIENT
Start: 2021-05-24 | End: 2021-07-07 | Stop reason: SDUPTHER

## 2021-05-24 RX ORDER — TRAZODONE HYDROCHLORIDE 50 MG/1
50 TABLET ORAL
Qty: 90 TABLET | Refills: 1 | Status: SHIPPED | OUTPATIENT
Start: 2021-05-24 | End: 2021-12-20

## 2021-05-24 RX ORDER — TRAMADOL HYDROCHLORIDE 50 MG/1
50 TABLET ORAL
Qty: 90 TABLET | Refills: 0 | Status: SHIPPED | OUTPATIENT
Start: 2021-05-24 | End: 2021-05-27

## 2021-05-24 RX ORDER — SERTRALINE HYDROCHLORIDE 100 MG/1
100 TABLET, FILM COATED ORAL DAILY
Qty: 90 TABLET | Refills: 1 | Status: CANCELLED | OUTPATIENT
Start: 2021-05-24

## 2021-05-24 NOTE — PROGRESS NOTES
Leodan France (: 1950) is a 80 y.o. female, established patient, here for evaluation of the following chief complaint(s):   Medication Refill, Pain (Chronic) (From recent fall, fell at the hospital. ), and Other (Has a hematoma on hip )       ASSESSMENT/PLAN:  Below is the assessment and plan developed based on review of pertinent labs, studies, and medications. 1. Contusion of coccyx, initial encounter  -     traMADoL (ULTRAM) 50 mg tablet; Take 1 Tablet by mouth every eight (8) hours as needed for Pain for up to 3 days. Max Daily Amount: 150 mg., Normal, Disp-90 Tablet, R-0  2. Hematoma of right hip, subsequent encounter  -     traMADoL (ULTRAM) 50 mg tablet; Take 1 Tablet by mouth every eight (8) hours as needed for Pain for up to 3 days. Max Daily Amount: 150 mg., Normal, Disp-90 Tablet, R-0  3. Frequent falls  -     traMADoL (ULTRAM) 50 mg tablet; Take 1 Tablet by mouth every eight (8) hours as needed for Pain for up to 3 days. Max Daily Amount: 150 mg., Normal, Disp-90 Tablet, R-0  4. Orthostatic hypotension  5. Essential hypertension  -     LIPID PANEL; Future  -     METABOLIC PANEL, COMPREHENSIVE; Future  -     CBC W/O DIFF; Future  6. Parkinson's syndrome ([facility] [address]  7. Hyponatremia  8. Anxiety  9. Chronic depression  10. Chronic insomnia  11. Vitamin D deficiency  -     VITAMIN D, 25 HYDROXY; Future  12. Fatigue, unspecified type  -     VITAMIN B12 & FOLATE; Future  -     TSH 3RD GENERATION; Future      Return in about 4 weeks (around 2021). SUBJECTIVE/OBJECTIVE:  Pt. is seen virtually with her  for f/u. Has a few chronic medical issues as documented. Gait is unsteady and she has had frequent falls. Had recent hospitalization and rehab as documented in my previous note. Had a bad fall when in the hospital hurting her right hip and coccyx but no fractures. Has a hematoma on right hip. Coccyx is very sore and she is unable to sit for long periods of time.   Has been taking tramadol 4 times daily. Ended up in ER with fecal impaction. Has not been drinking enough fluids. Not having enough fiber in the diet. And has not been active because of pain. Has orthostatic hypotension. Has been started on Florinef twice daily which has helped some. Has chronic depression anxiety. Remains on Zoloft, trazodone, and Seroquel. Also has been started on hydroxyzine for anxiety. Has issues with hyponatremia. Taking sodium chloride pills. Depends on  for some ADLs. She is mostly at home. Taking precautions for Covid 19. Denies any related signs or symptoms including fever, cough, SOB or CP. PMH/PSH/Allergies/Social History/medication list and most recent studies reviewed with patient. Tobacco use: No  Alcohol use: No    Reports compliance with medications and diet. Not active physically. Reports no other new c/o.     Plan:  Refill requested medications as documented  Stop hydroxyzine because of potential side effects including constipation and orthostatic hypotension  Some of her medications including Zoloft and trazodone may contribute to hyponatremia  Recheck labs  Use tramadol only as needed  Take Tylenol 500 mg 2 3 times daily scheduled  Increase fiber intake  While she should limit free water intake, lack of fluids can make constipation worse  Continue MiraLAX and stool softeners  Use rectal suppositories as needed  Fall precautions discussed  Follow-up with other MDs/specialists as scheduled  COVID-19 precautions discussed with pt  Follow-up 1 month or as needed      Physical Exam    [INSTRUCTIONS:  \"[x]\" Indicates a positive item  \"[]\" Indicates a negative item  -- DELETE ALL ITEMS NOT EXAMINED]    Constitutional: [x] Appears well-developed and well-nourished [x] No apparent distress      [] Abnormal -     Mental status: [x] Alert and awake  [x] Oriented to person/place/time [x] Able to follow commands    [] Abnormal -     Eyes:   EOM    [x]  Normal    [] Abnormal - Sclera  [x]  Normal    [] Abnormal -          Discharge [x]  None visible   [] Abnormal -     HENT: [x] Normocephalic, atraumatic  [] Abnormal -   [x] Mouth/Throat: Mucous membranes are moist    External Ears [x] Normal  [] Abnormal -    Neck: [x] No visualized mass [] Abnormal -     Pulmonary/Chest: [x] Respiratory effort normal   [x] No visualized signs of difficulty breathing or respiratory distress        [] Abnormal -      Musculoskeletal:   [x] Normal gait with no signs of ataxia         [x] Normal range of motion of neck        [] Abnormal -     Neurological:        [x] No Facial Asymmetry (Cranial nerve 7 motor function) (limited exam due to video visit)          [x] No gaze palsy        [] Abnormal -          Skin:        [x] No significant exanthematous lesions or discoloration noted on facial skin         [] Abnormal -            Psychiatric:       [x] Normal Affect [] Abnormal -        [x] No Hallucinations    Other pertinent observable physical exam findings:-        On this date 05/24/2021 I have spent 25 minutes reviewing previous notes, test results and face to face (virtual) with the patient discussing the diagnosis and importance of compliance with the treatment plan as well as documenting on the day of the visitAlfonso Pastor, was evaluated through a synchronous (real-time) audio-video encounter. The patient (or guardian if applicable) is aware that this is a billable service. Verbal consent to proceed has been obtained within the past 12 months. The visit was conducted pursuant to the emergency declaration under the [address] [place] [address], [address] [place] [address] and the [employer] Act. Patient identification was verified, and a caregiver was present when appropriate. The patient was located in a state where the provider was credentialed to provide care.       An electronic signature was used to authenticate this note.   -- Luke Reyes, DO

## 2021-05-24 NOTE — TELEPHONE ENCOUNTER
Request for new script on Sodium Chloride 1gm tab Qty 60 take 1 tab by mouth twice daily    origionaly prescribed by different provider - last filled in 2017    Request for Potassium ChCr 20MEQTAB  Take 1 by mouth twice daily

## 2021-05-24 NOTE — PROGRESS NOTES
Results for orders placed or performed in visit on 99/43/70   METABOLIC PANEL, COMPREHENSIVE   Result Value Ref Range    Glucose 88 65 - 99 mg/dL    BUN 11 8 - 27 mg/dL    Creatinine 0.72 0.57 - 1.00 mg/dL    GFR est non-AA 86 >59 mL/min/1.73    GFR est AA 99 >59 mL/min/1.73    BUN/Creatinine ratio 15 12 - 28    Sodium 127 (L) 134 - 144 mmol/L    Potassium 4.5 3.5 - 5.2 mmol/L    Chloride 91 (L) 96 - 106 mmol/L    CO2 23 20 - 29 mmol/L    Calcium 9.8 8.7 - 10.3 mg/dL    Protein, total 7.2 6.0 - 8.5 g/dL    Albumin 4.6 3.8 - 4.8 g/dL    GLOBULIN, TOTAL 2.6 1.5 - 4.5 g/dL    A-G Ratio 1.8 1.2 - 2.2    Bilirubin, total 0.4 0.0 - 1.2 mg/dL    Alk. phosphatase 91 39 - 117 IU/L    AST (SGOT) 16 0 - 40 IU/L    ALT (SGPT) 4 0 - 32 IU/L   CBC W/O DIFF   Result Value Ref Range    WBC 9.6 3.4 - 10.8 x10E3/uL    RBC 4.89 3.77 - 5.28 x10E6/uL    HGB 13.9 11.1 - 15.9 g/dL    HCT 40.5 34.0 - 46.6 %    MCV 83 79 - 97 fL    MCH 28.4 26.6 - 33.0 pg    MCHC 34.3 31.5 - 35.7 g/dL    RDW 14.7 11.7 - 15.4 %    PLATELET 100 440 - 373 x10E3/uL   TSH 3RD GENERATION   Result Value Ref Range    TSH 1.680 0.450 - 4.500 uIU/mL       Health Maintenance Due   Topic Date Due    Hepatitis C Screening  Never done    COVID-19 Vaccine (1) Never done    DTaP/Tdap/Td series (1 - Tdap) Never done    Shingrix Vaccine Age 50> (1 of 2) Never done    Colorectal Cancer Screening Combo  Never done    Pneumococcal 65+ years (1 of 1 - PPSV23) Never done    Breast Cancer Screen Mammogram  04/08/2018       No chief complaint on file. 1. Have you been to the ER, urgent care clinic since your last visit? Hospitalized since your last visit? YES, 5/6/21, [facility], Dizziness/lightheaded, trouble walking and gait problems     2. Have you seen or consulted any other health care providers outside of the [address] [place] [address] since your last visit? Include any pap smears or colon screening.  No    3) Do you have an Advance Directive on file? no    4) Are you interested in receiving information on Advance Directives? NO      Patient is accompanied by spouse I have received verbal consent from Sapphire Cordova to discuss any/all medical information while they are present in the room.

## 2021-05-25 ENCOUNTER — DOCUMENTATION ONLY (OUTPATIENT)
Dept: INTERNAL MEDICINE CLINIC | Age: 71
End: 2021-05-25

## 2021-05-25 NOTE — PROGRESS NOTES
Chief Complaint   Patient presents with    Prior Auth     Received denial for Lidocaine patch,         Call placed to pt to notify and left VM .

## 2021-05-27 RX ORDER — SODIUM CHLORIDE TAB 1 GM 1 G
TAB MISCELLANEOUS
COMMUNITY
Start: 2021-05-06 | End: 2021-05-27 | Stop reason: SDUPTHER

## 2021-05-28 ENCOUNTER — PATIENT MESSAGE (OUTPATIENT)
Dept: INTERNAL MEDICINE CLINIC | Age: 71
End: 2021-05-28

## 2021-05-28 NOTE — TELEPHONE ENCOUNTER
Spoke with Gage Bundy and he states he is taking Mrs. Dion Johansen to the [facility] due to a fall a while ago where she hasn't recovered. I advised him to keep us updated. I also advised them about the pure wick system regarding the catheter. I advised him that in order for ins. To possibly cover it there has to be a visit based on problem and evidence of incontinence .    He states he will discuss it at pt next appt on 6/8.21  There was verbalizing

## 2021-06-01 RX ORDER — SODIUM CHLORIDE TAB 1 GM 1 G
1 TAB MISCELLANEOUS 2 TIMES DAILY
Qty: 60 TABLET | Refills: 2 | Status: SHIPPED | OUTPATIENT
Start: 2021-06-01 | End: 2021-07-07 | Stop reason: SDUPTHER

## 2021-06-01 RX ORDER — CARBIDOPA AND LEVODOPA 25; 100 MG/1; MG/1
TABLET ORAL
OUTPATIENT
Start: 2021-06-01

## 2021-06-01 RX ORDER — BUSPIRONE HYDROCHLORIDE 15 MG/1
15 TABLET ORAL 3 TIMES DAILY
Qty: 90 TABLET | Refills: 5 | Status: SHIPPED | OUTPATIENT
Start: 2021-06-01 | End: 2021-07-16 | Stop reason: SDUPTHER

## 2021-06-03 ENCOUNTER — TELEPHONE (OUTPATIENT)
Dept: INTERNAL MEDICINE CLINIC | Age: 71
End: 2021-06-03

## 2021-06-03 NOTE — TELEPHONE ENCOUNTER
[facility] called to let Gladis Beltre know that patient was admitted to [facility]

## 2021-06-15 ENCOUNTER — OFFICE VISIT (OUTPATIENT)
Dept: INTERNAL MEDICINE CLINIC | Age: 71
End: 2021-06-15
Payer: MEDICARE

## 2021-06-15 VITALS
BODY MASS INDEX: 20.49 KG/M2 | DIASTOLIC BLOOD PRESSURE: 80 MMHG | RESPIRATION RATE: 16 BRPM | TEMPERATURE: 97.7 F | SYSTOLIC BLOOD PRESSURE: 120 MMHG | HEIGHT: 65 IN | HEART RATE: 82 BPM | OXYGEN SATURATION: 98 % | WEIGHT: 123 LBS

## 2021-06-15 DIAGNOSIS — R26.81 GAIT INSTABILITY: ICD-10-CM

## 2021-06-15 DIAGNOSIS — G20 PARKINSON'S SYNDROME (HCC): ICD-10-CM

## 2021-06-15 DIAGNOSIS — I95.1 ORTHOSTATIC HYPOTENSION: Primary | ICD-10-CM

## 2021-06-15 DIAGNOSIS — S32.10XS CLOSED FRACTURE OF SACRUM, UNSPECIFIED PORTION OF SACRUM, SEQUELA: ICD-10-CM

## 2021-06-15 DIAGNOSIS — I10 ESSENTIAL HYPERTENSION: ICD-10-CM

## 2021-06-15 PROCEDURE — G8427 DOCREV CUR MEDS BY ELIG CLIN: HCPCS | Performed by: INTERNAL MEDICINE

## 2021-06-15 PROCEDURE — G8420 CALC BMI NORM PARAMETERS: HCPCS | Performed by: INTERNAL MEDICINE

## 2021-06-15 PROCEDURE — 1101F PT FALLS ASSESS-DOCD LE1/YR: CPT | Performed by: INTERNAL MEDICINE

## 2021-06-15 PROCEDURE — G8752 SYS BP LESS 140: HCPCS | Performed by: INTERNAL MEDICINE

## 2021-06-15 PROCEDURE — G9717 DOC PT DX DEP/BP F/U NT REQ: HCPCS | Performed by: INTERNAL MEDICINE

## 2021-06-15 PROCEDURE — 3017F COLORECTAL CA SCREEN DOC REV: CPT | Performed by: INTERNAL MEDICINE

## 2021-06-15 PROCEDURE — G0463 HOSPITAL OUTPT CLINIC VISIT: HCPCS | Performed by: INTERNAL MEDICINE

## 2021-06-15 PROCEDURE — G8754 DIAS BP LESS 90: HCPCS | Performed by: INTERNAL MEDICINE

## 2021-06-15 PROCEDURE — 1090F PRES/ABSN URINE INCON ASSESS: CPT | Performed by: INTERNAL MEDICINE

## 2021-06-15 PROCEDURE — G8400 PT W/DXA NO RESULTS DOC: HCPCS | Performed by: INTERNAL MEDICINE

## 2021-06-15 PROCEDURE — G8536 NO DOC ELDER MAL SCRN: HCPCS | Performed by: INTERNAL MEDICINE

## 2021-06-15 PROCEDURE — 99214 OFFICE O/P EST MOD 30 MIN: CPT | Performed by: INTERNAL MEDICINE

## 2021-06-15 RX ORDER — PETROLATUM,WHITE/LANOLIN
500 OINTMENT (GRAM) TOPICAL
COMMUNITY

## 2021-06-15 RX ORDER — DICLOFENAC SODIUM 10 MG/G
GEL TOPICAL 4 TIMES DAILY
COMMUNITY
End: 2022-03-22 | Stop reason: SDUPTHER

## 2021-06-15 RX ORDER — TRAMADOL HYDROCHLORIDE 50 MG/1
50 TABLET ORAL
COMMUNITY
End: 2021-09-07

## 2021-06-15 RX ORDER — IMIPRAMINE HYDROCHLORIDE 10 MG/1
10 TABLET ORAL
COMMUNITY
End: 2021-07-26

## 2021-06-15 NOTE — PROGRESS NOTES
Health Maintenance Due   Topic Date Due    Hepatitis C Screening  Never done    DTaP/Tdap/Td series (1 - Tdap) Never done    Shingrix Vaccine Age 50> (1 of 2) Never done    Colorectal Cancer Screening Combo  Never done    Pneumococcal 65+ years (1 of 1 - PPSV23) Never done    Breast Cancer Screen Mammogram  04/08/2018       Chief Complaint   Patient presents with   [facility] Follow Up       1. Have you been to the ER, urgent care clinic since your last visit? Hospitalized since your last visit? Yes When: 5/27/21-6/10/21., [place], fall and sacrum injury    2. Have you seen or consulted any other health care providers outside of the [address] [place] [address] since your last visit? Include any pap smears or colon screening. No    3) Do you have an Advance Directive on file? no    4) Are you interested in receiving information on Advance Directives? NO      Patient is accompanied by  I have received verbal consent from Poli  to discuss any/all medical information while they are present in the room.

## 2021-06-16 DIAGNOSIS — E87.5 HYPERKALEMIA: Primary | ICD-10-CM

## 2021-06-16 LAB
25(OH)D3+25(OH)D2 SERPL-MCNC: 48.4 NG/ML (ref 30–100)
ALBUMIN SERPL-MCNC: 4.6 G/DL (ref 3.8–4.8)
ALBUMIN/GLOB SERPL: 1.8 {RATIO} (ref 1.2–2.2)
ALP SERPL-CCNC: 59 IU/L (ref 48–121)
ALT SERPL-CCNC: 9 IU/L (ref 0–32)
AST SERPL-CCNC: 19 IU/L (ref 0–40)
BASOPHILS # BLD AUTO: 0.1 X10E3/UL (ref 0–0.2)
BASOPHILS NFR BLD AUTO: 1 %
BILIRUB SERPL-MCNC: 0.3 MG/DL (ref 0–1.2)
BUN SERPL-MCNC: 17 MG/DL (ref 8–27)
BUN/CREAT SERPL: 24 (ref 12–28)
CALCIUM SERPL-MCNC: 10 MG/DL (ref 8.7–10.3)
CHLORIDE SERPL-SCNC: 101 MMOL/L (ref 96–106)
CHOLEST SERPL-MCNC: 200 MG/DL (ref 100–199)
CO2 SERPL-SCNC: 22 MMOL/L (ref 20–29)
CREAT SERPL-MCNC: 0.71 MG/DL (ref 0.57–1)
EOSINOPHIL # BLD AUTO: 0.2 X10E3/UL (ref 0–0.4)
EOSINOPHIL NFR BLD AUTO: 2 %
ERYTHROCYTE [DISTWIDTH] IN BLOOD BY AUTOMATED COUNT: 13.9 % (ref 11.7–15.4)
FOLATE SERPL-MCNC: 7.1 NG/ML
GLOBULIN SER CALC-MCNC: 2.5 G/DL (ref 1.5–4.5)
GLUCOSE SERPL-MCNC: 61 MG/DL (ref 65–99)
HCT VFR BLD AUTO: 41.3 % (ref 34–46.6)
HDLC SERPL-MCNC: 104 MG/DL
HGB BLD-MCNC: 13.1 G/DL (ref 11.1–15.9)
IMM GRANULOCYTES # BLD AUTO: 0.1 X10E3/UL (ref 0–0.1)
IMM GRANULOCYTES NFR BLD AUTO: 1 %
IMP & REVIEW OF LAB RESULTS: NORMAL
LDLC SERPL CALC-MCNC: 78 MG/DL (ref 0–99)
LYMPHOCYTES # BLD AUTO: 1 X10E3/UL (ref 0.7–3.1)
LYMPHOCYTES NFR BLD AUTO: 15 %
MCH RBC QN AUTO: 27.5 PG (ref 26.6–33)
MCHC RBC AUTO-ENTMCNC: 31.7 G/DL (ref 31.5–35.7)
MCV RBC AUTO: 87 FL (ref 79–97)
MONOCYTES # BLD AUTO: 0.6 X10E3/UL (ref 0.1–0.9)
MONOCYTES NFR BLD AUTO: 10 %
NEUTROPHILS # BLD AUTO: 4.6 X10E3/UL (ref 1.4–7)
NEUTROPHILS NFR BLD AUTO: 71 %
PLATELET # BLD AUTO: 363 X10E3/UL (ref 150–450)
POTASSIUM SERPL-SCNC: 5.8 MMOL/L (ref 3.5–5.2)
PROT SERPL-MCNC: 7.1 G/DL (ref 6–8.5)
RBC # BLD AUTO: 4.76 X10E6/UL (ref 3.77–5.28)
SODIUM SERPL-SCNC: 139 MMOL/L (ref 134–144)
TRIGL SERPL-MCNC: 108 MG/DL (ref 0–149)
TSH SERPL DL<=0.005 MIU/L-ACNC: 3.02 UIU/ML (ref 0.45–4.5)
VIT B12 SERPL-MCNC: 842 PG/ML (ref 232–1245)
VLDLC SERPL CALC-MCNC: 18 MG/DL (ref 5–40)
WBC # BLD AUTO: 6.5 X10E3/UL (ref 3.4–10.8)

## 2021-06-16 NOTE — PROGRESS NOTES
Blood sugar level was low at time of labs. Encourage regular meals/snacks to prevent hypoglycemia. Potassium level is elevated. May reduce KCl 20 mEq to daily instead of bid. Repeat BMP in 2 weeks.

## 2021-06-29 NOTE — PROGRESS NOTES
HISTORY OF PRESENT ILLNESS  Rola Sanchez is a [address] y.o. female. Pt. comes in with her  for f/u. PMH includes Parkinson's syndrome with gait instability and HTN with orthostatic hypotension. Started on midodrine. Had a fall in the hospital.    Has a coccyx fracture. Reports being very uncomfortable especially when sitting. Has been taking tramadol 4 times daily.  and I are concerned about possible side effects including falling. Depends on her  for many ADLs. Followed by neurologist and remains on Sinemet which has helped some. Denies any recent falls. She is alone at home when her  goes to work. Denies any signs or symptoms of COVID-19. No other acute complaints. HPI    Review of Systems   Constitutional: Negative. HENT: Negative. Eyes: Negative for blurred vision. Respiratory: Negative for shortness of breath. Cardiovascular: Negative for chest pain and leg swelling. Gastrointestinal: Negative for abdominal pain. Genitourinary: Negative for dysuria and frequency. Musculoskeletal: Positive for back pain, falls and joint pain. Skin: Negative. Neurological: Positive for dizziness, focal weakness (R leg) and weakness. Negative for sensory change and headaches. Psychiatric/Behavioral: Positive for depression and memory loss. Negative for hallucinations, substance abuse and suicidal ideas. The patient is nervous/anxious. The patient does not have insomnia. All other systems reviewed and are negative. Physical Exam  Vitals and nursing note reviewed. Constitutional:       General: She is not in acute distress (with pain). Appearance: She is well-developed. She is diaphoretic. Comments: Pleasant lady  Using a cane   HENT:      Head: Normocephalic and atraumatic. Nose: Nose normal.      Mouth/Throat:      Mouth: Mucous membranes are moist.   Eyes:      General: No scleral icterus.      Conjunctiva/sclera: Conjunctivae normal.   Neck: Thyroid: No thyromegaly. Vascular: No carotid bruit or JVD. Cardiovascular:      Rate and Rhythm: Normal rate and regular rhythm. Pulses: Normal pulses. Heart sounds: Normal heart sounds. No murmur heard. Pulmonary:      Effort: Pulmonary effort is normal. No respiratory distress. Breath sounds: Normal breath sounds. No wheezing or rales. Abdominal:      General: Bowel sounds are normal. There is no distension. Palpations: Abdomen is soft. Tenderness: There is no abdominal tenderness. Musculoskeletal:         General: Tenderness (Sacral/coccyx area) present. No deformity or signs of injury. Cervical back: Normal range of motion and neck supple. No rigidity. Right lower leg: No edema. Left lower leg: No edema. Comments: djd  Weak legs with muscle atrophy   Lymphadenopathy:      Cervical: No cervical adenopathy. Skin:     General: Skin is warm. Findings: No rash. Neurological:      Mental Status: She is alert and oriented to person, place, and time. Cranial Nerves: No cranial nerve deficit. Coordination: Coordination abnormal.      Gait: Gait abnormal.   Psychiatric:         Behavior: Behavior normal.      Comments: Seems anxious and depressed         ASSESSMENT and PLAN  Diagnoses and all orders for this visit:    1. Orthostatic hypotension    2. Parkinson's syndrome ([facility] [address]    3. Essential hypertension    4. Gait instability    5. Closed fracture of sacrum, unspecified portion of sacrum, sequela    Other orders  -     CBC WITH AUTOMATED DIFF  -     METABOLIC PANEL, COMPREHENSIVE  -     LIPID PANEL  -     VITAMIN B12 & FOLATE  -     TSH 3RD GENERATION  -     VITAMIN D, 25 HYDROXY  -     CVD REPORT      Follow-up and Dispositions    · Return in about 3 months (around 9/15/2021).      lab results and schedule of future lab studies reviewed with patient  reviewed diet, exercise and weight control  reviewed medications and side effects in detail  F/u with other MD's as scheduled  Fall precautions discussed  COVID-19 precautions discussed with pt  Monitor BP at home with goal of 140/90 or less  Monitor for orthostatic hypotension

## 2021-07-07 DIAGNOSIS — F51.04 CHRONIC INSOMNIA: Primary | ICD-10-CM

## 2021-07-07 DIAGNOSIS — J30.9 ALLERGIC RHINITIS, UNSPECIFIED SEASONALITY, UNSPECIFIED TRIGGER: ICD-10-CM

## 2021-07-07 DIAGNOSIS — E86.1 FLUID DEFICIT: ICD-10-CM

## 2021-07-07 DIAGNOSIS — K59.00 CONSTIPATION, UNSPECIFIED CONSTIPATION TYPE: ICD-10-CM

## 2021-07-08 RX ORDER — FLUDROCORTISONE ACETATE 0.1 MG/1
0.1 TABLET ORAL 2 TIMES DAILY
Qty: 180 TABLET | Refills: 1 | Status: SHIPPED | OUTPATIENT
Start: 2021-07-08 | End: 2021-08-02 | Stop reason: SDUPTHER

## 2021-07-08 RX ORDER — SODIUM CHLORIDE TAB 1 GM 1 G
1 TAB MISCELLANEOUS 2 TIMES DAILY
Qty: 60 TABLET | Refills: 2 | Status: SHIPPED | OUTPATIENT
Start: 2021-07-08 | End: 2021-10-18 | Stop reason: SDUPTHER

## 2021-07-08 RX ORDER — POLYETHYLENE GLYCOL 3350 17 G/17G
17 POWDER, FOR SOLUTION ORAL 2 TIMES DAILY
Qty: 595 G | Refills: 1 | Status: SHIPPED | OUTPATIENT
Start: 2021-07-08

## 2021-07-08 RX ORDER — QUETIAPINE FUMARATE 25 MG/1
25 TABLET, FILM COATED ORAL
Qty: 90 TABLET | Refills: 1 | Status: SHIPPED | OUTPATIENT
Start: 2021-07-08 | End: 2021-10-03 | Stop reason: SDUPTHER

## 2021-07-13 RX ORDER — MIDODRINE HYDROCHLORIDE 5 MG/1
5 TABLET ORAL
Qty: 90 TABLET | Refills: 3 | Status: SHIPPED | OUTPATIENT
Start: 2021-07-13 | End: 2021-08-02 | Stop reason: ALTCHOICE

## 2021-07-13 RX ORDER — ONDANSETRON 4 MG/1
4 TABLET, ORALLY DISINTEGRATING ORAL
Qty: 30 TABLET | Refills: 5 | Status: SHIPPED | OUTPATIENT
Start: 2021-07-13 | End: 2021-08-02 | Stop reason: SDUPTHER

## 2021-07-16 DIAGNOSIS — G31.9 CEREBRAL ATROPHY, MILD (HCC): Primary | ICD-10-CM

## 2021-07-16 RX ORDER — BUSPIRONE HYDROCHLORIDE 15 MG/1
15 TABLET ORAL 3 TIMES DAILY
Qty: 90 TABLET | Refills: 5 | Status: SHIPPED | OUTPATIENT
Start: 2021-07-16 | End: 2022-01-28 | Stop reason: SDUPTHER

## 2021-07-16 RX ORDER — CARBIDOPA AND LEVODOPA 25; 100 MG/1; MG/1
TABLET ORAL
OUTPATIENT
Start: 2021-07-16

## 2021-07-20 DIAGNOSIS — G20 PARKINSON DISEASE (HCC): Primary | ICD-10-CM

## 2021-07-20 RX ORDER — CARBIDOPA AND LEVODOPA 25; 100 MG/1; MG/1
TABLET ORAL
Qty: 675 TABLET | Refills: 1 | Status: SHIPPED | OUTPATIENT
Start: 2021-07-20 | End: 2021-09-24 | Stop reason: SDUPTHER

## 2021-07-26 ENCOUNTER — VIRTUAL VISIT (OUTPATIENT)
Dept: INTERNAL MEDICINE CLINIC | Age: 71
End: 2021-07-26
Payer: MEDICARE

## 2021-07-26 DIAGNOSIS — I95.1 ORTHOSTATIC HYPOTENSION: ICD-10-CM

## 2021-07-26 DIAGNOSIS — F41.9 ANXIETY: ICD-10-CM

## 2021-07-26 DIAGNOSIS — R26.81 GAIT INSTABILITY: ICD-10-CM

## 2021-07-26 DIAGNOSIS — Z09 HOSPITAL DISCHARGE FOLLOW-UP: ICD-10-CM

## 2021-07-26 DIAGNOSIS — E87.1 HYPONATREMIA: ICD-10-CM

## 2021-07-26 DIAGNOSIS — G20 PARKINSON'S SYNDROME (HCC): ICD-10-CM

## 2021-07-26 DIAGNOSIS — I10 ESSENTIAL HYPERTENSION: Primary | ICD-10-CM

## 2021-07-26 DIAGNOSIS — F32.A CHRONIC DEPRESSION: ICD-10-CM

## 2021-07-26 PROCEDURE — G8420 CALC BMI NORM PARAMETERS: HCPCS | Performed by: INTERNAL MEDICINE

## 2021-07-26 PROCEDURE — G8756 NO BP MEASURE DOC: HCPCS | Performed by: INTERNAL MEDICINE

## 2021-07-26 PROCEDURE — 99214 OFFICE O/P EST MOD 30 MIN: CPT | Performed by: INTERNAL MEDICINE

## 2021-07-26 PROCEDURE — 1101F PT FALLS ASSESS-DOCD LE1/YR: CPT | Performed by: INTERNAL MEDICINE

## 2021-07-26 PROCEDURE — G0463 HOSPITAL OUTPT CLINIC VISIT: HCPCS | Performed by: INTERNAL MEDICINE

## 2021-07-26 PROCEDURE — 1111F DSCHRG MED/CURRENT MED MERGE: CPT | Performed by: INTERNAL MEDICINE

## 2021-07-26 PROCEDURE — G9717 DOC PT DX DEP/BP F/U NT REQ: HCPCS | Performed by: INTERNAL MEDICINE

## 2021-07-26 PROCEDURE — 1090F PRES/ABSN URINE INCON ASSESS: CPT | Performed by: INTERNAL MEDICINE

## 2021-07-26 PROCEDURE — G8536 NO DOC ELDER MAL SCRN: HCPCS | Performed by: INTERNAL MEDICINE

## 2021-07-26 PROCEDURE — G8400 PT W/DXA NO RESULTS DOC: HCPCS | Performed by: INTERNAL MEDICINE

## 2021-07-26 PROCEDURE — G8427 DOCREV CUR MEDS BY ELIG CLIN: HCPCS | Performed by: INTERNAL MEDICINE

## 2021-07-26 PROCEDURE — 3017F COLORECTAL CA SCREEN DOC REV: CPT | Performed by: INTERNAL MEDICINE

## 2021-07-26 NOTE — PROGRESS NOTES
Rene Martell (: 1950) is a 80 y.o. female, established patient, here for evaluation of the following chief complaint(s):   Hospital Follow Up (elevated BP), Hypertension, Migraine, and Dizziness       ASSESSMENT/PLAN:  Below is the assessment and plan developed based on review of pertinent labs, studies, and medications. 1. Essential hypertension  2. Orthostatic hypotension  3. Hospital discharge follow-up  -     [place] DISCHARGE MEDS RECONCILED W/ CURRENT OUTPATIENT MED LIST  4. Hyponatremia  5. Parkinson's syndrome ([facility] [address]  6. Gait instability  7. Anxiety  8. Chronic depression    Decrease midodrine to 1 twice daily  Increase Florinef to 1 daily  Stop imipramine  Decrease sodium pills to 1 daily  Limit free water intake  Check orthostatic BP and pulse  May need labetalol if BP goes up    Return in about 4 weeks (around 2021). SUBJECTIVE/OBJECTIVE:  Pt. is seen virtually for f/u. Has a few chronic medical issues as documented including Parkinson's syndrome, HTN, orthostatic hypotension, chronic pain, anxiety/depression, headaches with dizziness. Has had a few hospitalizations. Reports BP being elevated from time to time. Continues to have postural dizziness with hypotension. Remains on midodrine and Florinef. On a number of other medications including Sinemet. Followed by neurologist.  Lives with  and depends on him for some ADLs. Takes tramadol 3 times daily for pain. Has had falls off and on. Reports taking precautions for Covid 19. Denies any related signs or symptoms including fever, cough, SOB or CP. PMH/PSH/Allergies/Social History/medication list and most recent studies reviewed with patient. Labs in 2021 were stable. Tobacco use: No  Alcohol use: No    Reports compliance with medications and diet. She is not active physically. . Reports no other new c/o.     Plan:  See above recommendations  Continue other medications  Watch diet and increase activity as tolerated  Fall precautions discussed  Follow-up with other MDs/specialists as scheduled  COVID-19 precautions discussed with pt  Follow-up for weeks or as needed      Physical Exam    [INSTRUCTIONS:  \"[x]\" Indicates a positive item  \"[]\" Indicates a negative item  -- DELETE ALL ITEMS NOT EXAMINED]    Constitutional: [x] Appears well-developed and well-nourished [x] No apparent distress      [] Abnormal -     Mental status: [x] Alert and awake  [x] Oriented to person/place/time [x] Able to follow commands    [] Abnormal -     Eyes:   EOM    [x]  Normal    [] Abnormal -   Sclera  [x]  Normal    [] Abnormal -          Discharge [x]  None visible   [] Abnormal -     HENT: [x] Normocephalic, atraumatic  [] Abnormal -   [x] Mouth/Throat: Mucous membranes are moist    External Ears [x] Normal  [] Abnormal -    Neck: [x] No visualized mass [] Abnormal -     Pulmonary/Chest: [x] Respiratory effort normal   [x] No visualized signs of difficulty breathing or respiratory distress        [] Abnormal -      Musculoskeletal:   [x] Normal gait with no signs of ataxia         [x] Normal range of motion of neck        [] Abnormal -     Neurological:        [x] No Facial Asymmetry (Cranial nerve 7 motor function) (limited exam due to video visit)          [x] No gaze palsy        [] Abnormal -          Skin:        [x] No significant exanthematous lesions or discoloration noted on facial skin         [] Abnormal -            Psychiatric:       [x] Normal Affect [] Abnormal -        [x] No Hallucinations    Other pertinent observable physical exam findings:-        On this date 07/26/2021 I have spent 25 minutes reviewing previous notes, test results and face to face (virtual) with the patient discussing the diagnosis and importance of compliance with the treatment plan as well as documenting on the day of the visitAlfonso Brumfield was evaluated through a synchronous (real-time) audio-video encounter.  The patient (or guardian if applicable) is aware that this is a billable service. Verbal consent to proceed has been obtained within the past 12 months. The visit was conducted pursuant to the emergency declaration under the [address] [place] [address] authority and the [employer] General Act. Patient identification was verified, and a caregiver was present when appropriate. The patient was located in a state where the provider was credentialed to provide care. An electronic signature was used to authenticate this note.   -- April Narayan DO

## 2021-07-26 NOTE — PROGRESS NOTES
Health Maintenance Due   Topic Date Due    Hepatitis C Screening  Never done    DTaP/Tdap/Td series (1 - Tdap) Never done    Colorectal Cancer Screening Combo  Never done    Shingrix Vaccine Age 50> (1 of 2) Never done    Pneumococcal 65+ years (1 of 1 - PPSV23) Never done    Breast Cancer Screen Mammogram  04/08/2018       Chief Complaint   Patient presents with   [facility] Follow Up     elevated BP    Hypertension    Migraine    Dizziness       1. Have you been to the ER, urgent care clinic since your last visit? Hospitalized since your last visit? Yes , elevated BP,    2. Have you seen or consulted any other health care providers outside of the [address] [place] [address] since your last visit? Include any pap smears or colon screening. No    3) Do you have an Advance Directive on file? no    4) Are you interested in receiving information on Advance Directives? NO      Patient is accompanied by self I have received verbal consent from [place] to discuss any/all medical information while they are present in the room.

## 2021-07-28 ENCOUNTER — TELEPHONE (OUTPATIENT)
Dept: INTERNAL MEDICINE CLINIC | Age: 71
End: 2021-07-28

## 2021-07-28 NOTE — TELEPHONE ENCOUNTER
Pt having fluctuating blood pressures  today's visit 180/100 using manual cuff  Automatic machine read 163/96 after getting up and moving(sitting)  Standing reading 146/82  Pt having headaches feeling whoosie

## 2021-08-02 ENCOUNTER — VIRTUAL VISIT (OUTPATIENT)
Dept: INTERNAL MEDICINE CLINIC | Age: 71
End: 2021-08-02
Payer: MEDICARE

## 2021-08-02 DIAGNOSIS — J30.9 ALLERGIC RHINITIS, UNSPECIFIED SEASONALITY, UNSPECIFIED TRIGGER: ICD-10-CM

## 2021-08-02 DIAGNOSIS — I10 ESSENTIAL HYPERTENSION: Primary | ICD-10-CM

## 2021-08-02 DIAGNOSIS — I95.1 ORTHOSTATIC HYPOTENSION: ICD-10-CM

## 2021-08-02 DIAGNOSIS — G20 PARKINSON'S SYNDROME (HCC): ICD-10-CM

## 2021-08-02 DIAGNOSIS — F41.9 ANXIETY: ICD-10-CM

## 2021-08-02 PROCEDURE — G0463 HOSPITAL OUTPT CLINIC VISIT: HCPCS | Performed by: INTERNAL MEDICINE

## 2021-08-02 PROCEDURE — G8400 PT W/DXA NO RESULTS DOC: HCPCS | Performed by: INTERNAL MEDICINE

## 2021-08-02 PROCEDURE — 1101F PT FALLS ASSESS-DOCD LE1/YR: CPT | Performed by: INTERNAL MEDICINE

## 2021-08-02 PROCEDURE — G8427 DOCREV CUR MEDS BY ELIG CLIN: HCPCS | Performed by: INTERNAL MEDICINE

## 2021-08-02 PROCEDURE — 99214 OFFICE O/P EST MOD 30 MIN: CPT | Performed by: INTERNAL MEDICINE

## 2021-08-02 PROCEDURE — G8536 NO DOC ELDER MAL SCRN: HCPCS | Performed by: INTERNAL MEDICINE

## 2021-08-02 PROCEDURE — 3017F COLORECTAL CA SCREEN DOC REV: CPT | Performed by: INTERNAL MEDICINE

## 2021-08-02 PROCEDURE — G9717 DOC PT DX DEP/BP F/U NT REQ: HCPCS | Performed by: INTERNAL MEDICINE

## 2021-08-02 PROCEDURE — 1090F PRES/ABSN URINE INCON ASSESS: CPT | Performed by: INTERNAL MEDICINE

## 2021-08-02 PROCEDURE — G8420 CALC BMI NORM PARAMETERS: HCPCS | Performed by: INTERNAL MEDICINE

## 2021-08-02 PROCEDURE — G8756 NO BP MEASURE DOC: HCPCS | Performed by: INTERNAL MEDICINE

## 2021-08-02 RX ORDER — FLUDROCORTISONE ACETATE 0.1 MG/1
0.1 TABLET ORAL 2 TIMES DAILY
Qty: 180 TABLET | Refills: 1 | Status: SHIPPED | OUTPATIENT
Start: 2021-08-02 | End: 2021-09-07

## 2021-08-02 RX ORDER — ONDANSETRON 4 MG/1
4 TABLET, ORALLY DISINTEGRATING ORAL
Qty: 30 TABLET | Refills: 5 | Status: SHIPPED | OUTPATIENT
Start: 2021-08-02 | End: 2021-09-09 | Stop reason: SDUPTHER

## 2021-08-02 NOTE — PROGRESS NOTES
Health Maintenance Due   Topic Date Due    Hepatitis C Screening  Never done    DTaP/Tdap/Td series (1 - Tdap) Never done    Colorectal Cancer Screening Combo  Never done    Shingrix Vaccine Age 50> (1 of 2) Never done    Pneumococcal 65+ years (1 of 1 - PPSV23) Never done    Breast Cancer Screen Mammogram  04/08/2018       No chief complaint on file. 1. Have you been to the ER, urgent care clinic since your last visit? Hospitalized since your last visit? No    2. Have you seen or consulted any other health care providers outside of the [address] [place] [address] since your last visit? Include any pap smears or colon screening. No    3) Do you have an Advance Directive on file? no    4) Are you interested in receiving information on Advance Directives? NO      Patient is accompanied by  I have received verbal consent from Zeke Chong to discuss any/all medical information while they are present in the room.

## 2021-08-02 NOTE — TELEPHONE ENCOUNTER
Patient aware to d/c midodrine and when advised regarding cardiology referral, patient and  requested to speak with Dr Haim Morfin to discuss cardiology, scheduled VV at 1am this am for discussion

## 2021-08-02 NOTE — PROGRESS NOTES
Kris Rosa (: 1950) is a 80 y.o. female, established patient, here for evaluation of the following chief complaint(s):   Referral Request, Back Pain, and Discuss Medications       ASSESSMENT/PLAN:  Below is the assessment and plan developed based on review of pertinent labs, studies, and medications. 1. Essential hypertension  -     REFERRAL TO CARDIOLOGY  2. Allergic rhinitis, unspecified seasonality, unspecified trigger  -     fludrocortisone (FLORINEF) 0.1 mg tablet; Take 1 Tablet by mouth two (2) times a day., Normal, Disp-180 Tablet, R-1  3. Orthostatic hypotension  -     REFERRAL TO CARDIOLOGY  4. Parkinson's syndrome (HCC)  -     REFERRAL TO CARDIOLOGY  5. Anxiety      Return in about 3 months (around 2021), or if symptoms worsen or fail to improve. SUBJECTIVE/OBJECTIVE:  Pt. is seen virtually with her  for f/u. Has a few chronic medical issues as documented including Parkinson's syndrome, HTN, orthostatic hypotension, chronic pain, anxiety/depression, headaches with dizziness. I adjusted medications recently. Reports BP doing better. Still dropping 10-20 points when standing. She is off midodrine and taking Florinef once a day. No recent falls. Overall not feeling as bad. On a number of other medications including Sinemet. Followed by neurologist.  Lives with  and depends on him for some ADLs. Takes tramadol 3 times daily for pain. Has had Covid vaccination reports taking precautions for Covid 19. Denies any related signs or symptoms including fever, cough, SOB or CP. PMH/PSH/Allergies/Social History/medication list and most recent studies reviewed with patient. Labs in 2021 were stable. Tobacco use: No  Alcohol use: No    Reports compliance with medications and diet. She is not active physically. . Reports no other new c/o.     Plan:  Remain off midodrine  Increase Florinef to 1 twice daily  Monitor BP and pulse sitting and standing  Fall precautions discussed  Refill Zofran  Continue other medications  Referred to cardiology for further evaluation of orthostatic hypotension  COVID-19 precautions discussed with pt  Follow-up as scheduled or as needed    [INSTRUCTIONS:  \"[x]\" Indicates a positive item  \"[]\" Indicates a negative item  -- DELETE ALL ITEMS NOT EXAMINED]    Constitutional: [x] Appears well-developed and well-nourished [x] No apparent distress      [] Abnormal -     Mental status: [x] Alert and awake  [x] Oriented to person/place/time [x] Able to follow commands    [] Abnormal -     Eyes:   EOM    [x]  Normal    [] Abnormal -   Sclera  [x]  Normal    [] Abnormal -          Discharge [x]  None visible   [] Abnormal -     HENT: [x] Normocephalic, atraumatic  [] Abnormal -   [x] Mouth/Throat: Mucous membranes are moist    External Ears [x] Normal  [] Abnormal -    Neck: [x] No visualized mass [] Abnormal -     Pulmonary/Chest: [x] Respiratory effort normal   [x] No visualized signs of difficulty breathing or respiratory distress        [] Abnormal -      Musculoskeletal:   [x] Normal gait with no signs of ataxia         [x] Normal range of motion of neck        [] Abnormal -     Neurological:        [x] No Facial Asymmetry (Cranial nerve 7 motor function) (limited exam due to video visit)          [x] No gaze palsy        [] Abnormal -          Skin:        [x] No significant exanthematous lesions or discoloration noted on facial skin         [] Abnormal -            Psychiatric:       [x] Normal Affect [] Abnormal -        [x] No Hallucinations    Other pertinent observable physical exam findings:-        On this date 08/02/2021 I have spent 25 minutes reviewing previous notes, test results and face to face (virtual) with the patient discussing the diagnosis and importance of compliance with the treatment plan as well as documenting on the day of the visit.     Conor Mejia, was evaluated through a synchronous (real-time) audio-video encounter. The patient (or guardian if applicable) is aware that this is a billable service. Verbal consent to proceed has been obtained within the past 12 months. The visit was conducted pursuant to the emergency declaration under the [address] [place] [address], [address] [place] [address] authority and the [employer] Act. Patient identification was verified, and a caregiver was present when appropriate. The patient was located in a state where the provider was credentialed to provide care. An electronic signature was used to authenticate this note.   -- Sruthi Lubin, DO

## 2021-09-07 ENCOUNTER — OFFICE VISIT (OUTPATIENT)
Dept: CARDIOLOGY CLINIC | Age: 71
End: 2021-09-07
Payer: MEDICARE

## 2021-09-07 VITALS
DIASTOLIC BLOOD PRESSURE: 90 MMHG | SYSTOLIC BLOOD PRESSURE: 150 MMHG | RESPIRATION RATE: 16 BRPM | BODY MASS INDEX: 20.49 KG/M2 | OXYGEN SATURATION: 95 % | WEIGHT: 123 LBS | HEART RATE: 105 BPM | HEIGHT: 65 IN

## 2021-09-07 DIAGNOSIS — E87.1 HYPONATREMIA: ICD-10-CM

## 2021-09-07 DIAGNOSIS — I95.1 ORTHOSTATIC HYPOTENSION: Primary | ICD-10-CM

## 2021-09-07 DIAGNOSIS — R29.6 FREQUENT FALLS: ICD-10-CM

## 2021-09-07 DIAGNOSIS — G20 PARKINSON'S SYNDROME (HCC): ICD-10-CM

## 2021-09-07 DIAGNOSIS — R03.0 ELEVATED BP WITHOUT DIAGNOSIS OF HYPERTENSION: ICD-10-CM

## 2021-09-07 PROCEDURE — G0463 HOSPITAL OUTPT CLINIC VISIT: HCPCS | Performed by: SPECIALIST

## 2021-09-07 PROCEDURE — 1090F PRES/ABSN URINE INCON ASSESS: CPT | Performed by: SPECIALIST

## 2021-09-07 PROCEDURE — 93005 ELECTROCARDIOGRAM TRACING: CPT | Performed by: SPECIALIST

## 2021-09-07 PROCEDURE — 93010 ELECTROCARDIOGRAM REPORT: CPT | Performed by: SPECIALIST

## 2021-09-07 PROCEDURE — 99204 OFFICE O/P NEW MOD 45 MIN: CPT | Performed by: SPECIALIST

## 2021-09-07 RX ORDER — FLUDROCORTISONE ACETATE 0.1 MG/1
0.1 TABLET ORAL DAILY
Qty: 90 TABLET | Refills: 1
Start: 2021-09-07 | End: 2021-10-13

## 2021-09-07 NOTE — PATIENT INSTRUCTIONS
Please reduce your Florinef to once a day. Please keep a blood pressure diary. Please check your blood pressure once a day at different times. Be sure to make sure you are sitting still for at least five minutes with both feet flat on the floor and arm heart level. Please write down the blood pressure, heart rate, date and time. We see you back with a virtual visit in about 4 weeks.

## 2021-09-07 NOTE — Clinical Note
9/7/2021    Patient: Maria G Fine   YOB: 1950   Date of Visit: 9/7/2021     Antionette Singh DO  [address]  [address] [facility] [address]  Via In Basket    Dear Antionette Singh DO,      Thank you for referring Ms. Abena Fuller to [facility] for evaluation. My notes for this consultation are attached. If you have questions, please do not hesitate to call me. I look forward to following your patient along with you.       Sincerely,    Dot Crigler, MD

## 2021-09-07 NOTE — PROGRESS NOTES
Zeke Chong     1950       Vinnie Murphy MD, [facility]  Date of Visit-9/7/2021   PCP is DO Dio Lam [facility]  [facility]  HPI:  Zeke Chong is a 80 y.o. female   Referred by Billie Ridley DO for hypertension and orthostatic hypotension. Was on Midodrine and switched to Florinef. She had an echo in 2017 that was normal. At that time she was hospitalized for falls and confusion. She also had hyponatremia. Reviewing her last several BP's she varies from 116 to 150 in the office with a pulse varying also from 60 to 105. She takes Florinef 0.1 BID. Pt is accompanied by her . Pt is in a wheelchair, and notes that she has Parkinson's. Pt reports that she feels like her skin is \"hot\", especially in her back, and experiences nausea. She states that she is SOB and has instances where she cannot grasp a full breath. She reports that she was at [facility] and was told she had a urinary infection. Her  states that she had her orthostatic BP when she was at the hospital. Pt states that she had a fall and broke her shoulder, and her BP fell yesterday. Her  notes that she has had significant trouble walking recently and staying balanced. Her  reports that he takes her BP multiple times a day, especially when she is lightheaded or dizzy, and notes that she is on sodium which he believes is why she is susceptible to falling. Home BP's vary up to 191 and will go down to to 147 over the last 7 days. The diastolic's are also elevated in the 90's to 100. Denies chest pain, edema, syncope or shortness of breath at rest, has no tachycardia, palpitations or sense of arrhythmia. EKG: Sinus  Tachycardia   -Old anteroseptal infarct. Assessment/Plan:     The primary encounter diagnosis was Orthostatic hypotension.  Diagnoses of Elevated BP without diagnosis of hypertension, Hyponatremia, Parkinson's syndrome ([facility] and Frequent falls were also pertinent to this visit. She has HTN but also on salt and florinef. I will reduce florinef and have her reduce it to once a day and steadily reduce slat intake. Goal is reduce BP without episodes of orthostasis. Will see her back virtually in 3 weeks. - AMB POC EKG ROUTINE W/ 12 LEADS, INTER & REP    Patient Instructions   Please reduce your Florinef to once a day. Please keep a blood pressure diary. Please check your blood pressure once a day at different times. Be sure to make sure you are sitting still for at least five minutes with both feet flat on the floor and arm heart level. Please write down the blood pressure, heart rate, date and time. We see you back with a virtual visit in about 4 weeks. F/u in 3-4 weeks with VV     Impression:   1. Orthostatic hypotension    2. Elevated BP without diagnosis of hypertension    3. Hyponatremia    4. Parkinson's syndrome ([facility]    5. Frequent falls       Cardiac History:   No specialty comments available. Future Appointments   Date Time Provider Sarah Tiradoi   9/14/2021  8:45 AM DO SANDI Shepard AMB      Future Appointments   Date Time Provider [facility] Sh[facility]ila   10/15/2021  1:00 PM MD TOM Becker AMB      ROS-declined full form -except as noted above. . A complete cardiac and respiratory are reviewed and negative except as above ; Resp-denies wheezing  or productive cough,. Const- No unusual weight loss or fever; Neuro-no recent seizure or CVA ; GI- No BRBPR, abdom pain, bloating ; - no  hematuria   see supplement sheet, initialed and to be scanned by staff  Past Medical History:   Diagnosis Date    CAD (coronary artery disease)     Hypertension     Hypokalaemia     Sacrum and coccyx fracture ([facility] 06/05/2021      Social Hx= reports that she quit smoking about 9 years ago. She smoked 0.50 packs per day.  She has never used smokeless tobacco. She reports previous alcohol use of about 1.0 - 2.0 standard drinks of alcohol per week. She reports that she does not use drugs. family history includes Diabetes in her mother. Allergies   Allergen Reactions    Pcn [Penicillins] Swelling      Exam and Labs:  BP (!) 150/90   Pulse (!) 105   Resp 16   Ht 5' 5\" (1.651 m)   Wt 123 lb (55.8 kg)   SpO2 95%   BMI 20.47 kg/m² Constitutional:  NAD, comfortable  Head: NC,AT. Eyes: No scleral icterus. Neck:  Neck supple. No JVD present. Throat: moist mucous membranes. Chest: Effort normal & normal respiratory excursion . Neurological: alert, conversant and oriented . Skin: Skin is not cold. No obvious systemic rash noted. Not diaphoretic. No erythema. Psychiatric:  Grossly normal mood and affect. Behavior appears normal. Extremities:  no clubbing or cyanosis. Abdomen: non distended    Lungs:breath sounds normal. No stridor. distress, wheezes or  Rales. Heart: normal rate, regular rhythm, normal S1, S2, no murmurs, rubs, clicks or gallops , PMI non displaced. Edema: Edema is none.   Lab Results   Component Value Date/Time    Cholesterol, total 200 (H) 06/15/2021 04:04 PM    HDL Cholesterol 104 06/15/2021 04:04 PM    LDL, calculated 78 06/15/2021 04:04 PM    LDL, calculated 117 (H) 04/04/2017 10:49 AM    Triglyceride 108 06/15/2021 04:04 PM    CHOL/HDL Ratio 2.6 05/10/2010 09:42 AM     Lab Results   Component Value Date/Time    Sodium 139 06/15/2021 04:04 PM    Potassium 5.8 (H) 06/15/2021 04:04 PM    Chloride 101 06/15/2021 04:04 PM    CO2 22 06/15/2021 04:04 PM    Anion gap 9 07/13/2017 06:28 AM    Glucose 61 (L) 06/15/2021 04:04 PM    BUN 17 06/15/2021 04:04 PM    Creatinine 0.71 06/15/2021 04:04 PM    BUN/Creatinine ratio 24 06/15/2021 04:04 PM    GFR est  06/15/2021 04:04 PM    GFR est non-AA 87 06/15/2021 04:04 PM    Calcium 10.0 06/15/2021 04:04 PM      Wt Readings from Last 3 Encounters:   09/07/21 123 lb (55.8 kg)   06/15/21 123 lb (55.8 kg)   04/01/21 132 lb (59.9 kg)      BP Readings from Last 3 Encounters:   09/07/21 (!) 150/90   06/15/21 120/80   04/01/21 118/72      Current Outpatient Medications   Medication Sig    fludrocortisone (FLORINEF) 0.1 mg tablet Take 1 Tablet by mouth daily.  ondansetron (ZOFRAN ODT) 4 mg disintegrating tablet Take 1 Tablet by mouth every eight (8) hours as needed for Nausea or Vomiting.  carbidopa-levodopa (SINEMET)  mg per tablet 1.5 tablets every 4 hours during the day, total of 5 times daily    busPIRone (BUSPAR) 15 mg tablet Take 1 Tablet by mouth three (3) times daily.  QUEtiapine (SEROquel) 25 mg tablet Take 1 Tablet by mouth nightly.  polyethylene glycol (MIRALAX) 17 gram/dose powder Take 17 g by mouth two (2) times a day.  sodium chloride 1 gram tablet Take 1 Tablet by mouth two (2) times a day. (Patient taking differently: Take 1 g by mouth daily.)    diclofenac (Voltaren) 1 % gel Apply  to affected area four (4) times daily.  glucosamine sulfate 1,000 mg cap Take 500 mg by mouth.  sertraline (ZOLOFT) 100 mg tablet Take 1 Tablet by mouth daily.  traZODone (DESYREL) 50 mg tablet Take 1 Tablet by mouth nightly.  potassium chloride (K-DUR, KLOR-CON) 20 mEq tablet Take 1 Tablet by mouth two (2) times a day.  lidocaine (LIDODERM) 5 % Apply patch to the affected area/lower back/coccyx  for 12 hours a day and remove for 12 hours a day.  calcium citrate-vitamin D3 1,000 mg-10 mcg /30 mL liqd Take  by mouth.  acetaminophen (Tylenol Extra Strength) 500 mg tablet Take  by mouth every six (6) hours as needed for Pain.  pantoprazole (PROTONIX) 40 mg tablet Take 1 Tab by mouth daily.  cholecalciferol (VITAMIN D3) 1,000 unit tablet Take 1,000 Units by mouth daily. No current facility-administered medications for this visit. Impression see above.       Written by Bassem Luo, as dictated by Kasey Conde MD.

## 2021-09-09 ENCOUNTER — VIRTUAL VISIT (OUTPATIENT)
Dept: INTERNAL MEDICINE CLINIC | Age: 71
End: 2021-09-09
Payer: MEDICARE

## 2021-09-09 DIAGNOSIS — R11.2 NON-INTRACTABLE VOMITING WITH NAUSEA, UNSPECIFIED VOMITING TYPE: ICD-10-CM

## 2021-09-09 DIAGNOSIS — K21.00 GASTROESOPHAGEAL REFLUX DISEASE WITH ESOPHAGITIS, UNSPECIFIED WHETHER HEMORRHAGE: ICD-10-CM

## 2021-09-09 DIAGNOSIS — I95.1 ORTHOSTATIC HYPOTENSION: Primary | ICD-10-CM

## 2021-09-09 PROCEDURE — G9717 DOC PT DX DEP/BP F/U NT REQ: HCPCS | Performed by: INTERNAL MEDICINE

## 2021-09-09 PROCEDURE — G8536 NO DOC ELDER MAL SCRN: HCPCS | Performed by: INTERNAL MEDICINE

## 2021-09-09 PROCEDURE — 3017F COLORECTAL CA SCREEN DOC REV: CPT | Performed by: INTERNAL MEDICINE

## 2021-09-09 PROCEDURE — 99214 OFFICE O/P EST MOD 30 MIN: CPT | Performed by: INTERNAL MEDICINE

## 2021-09-09 PROCEDURE — G8756 NO BP MEASURE DOC: HCPCS | Performed by: INTERNAL MEDICINE

## 2021-09-09 PROCEDURE — 1090F PRES/ABSN URINE INCON ASSESS: CPT | Performed by: INTERNAL MEDICINE

## 2021-09-09 PROCEDURE — G8420 CALC BMI NORM PARAMETERS: HCPCS | Performed by: INTERNAL MEDICINE

## 2021-09-09 PROCEDURE — G0463 HOSPITAL OUTPT CLINIC VISIT: HCPCS | Performed by: INTERNAL MEDICINE

## 2021-09-09 PROCEDURE — G8400 PT W/DXA NO RESULTS DOC: HCPCS | Performed by: INTERNAL MEDICINE

## 2021-09-09 PROCEDURE — 1101F PT FALLS ASSESS-DOCD LE1/YR: CPT | Performed by: INTERNAL MEDICINE

## 2021-09-09 PROCEDURE — G8427 DOCREV CUR MEDS BY ELIG CLIN: HCPCS | Performed by: INTERNAL MEDICINE

## 2021-09-09 RX ORDER — FAMOTIDINE 20 MG/1
20 TABLET, FILM COATED ORAL 2 TIMES DAILY
Qty: 60 TABLET | Refills: 0 | Status: SHIPPED | OUTPATIENT
Start: 2021-09-09 | End: 2021-10-04

## 2021-09-09 RX ORDER — ONDANSETRON 4 MG/1
4 TABLET, ORALLY DISINTEGRATING ORAL
Qty: 30 TABLET | Refills: 5 | Status: SHIPPED | OUTPATIENT
Start: 2021-09-09 | End: 2021-12-29 | Stop reason: SDUPTHER

## 2021-09-09 NOTE — PROGRESS NOTES
Justine Painter is a 80 y.o. female who was seen by synchronous (real-time) audio-video technology on 9/9/2021 for Vomiting (since thursday ) and Blood Pressure Check (orthostatic )        Assessment & Plan:   Diagnoses and all orders for this visit:    1. Orthostatic hypotension        -      Saw cardiology earlier this week. Is to trend her BP. Is currently on florinef daily. 2. Non-intractable vomiting with nausea, unspecified vomiting type  -     ondansetron (ZOFRAN ODT) 4 mg disintegrating tablet; Take 1 Tablet by mouth every eight (8) hours as needed for Nausea or Vomiting.        -      Is to take 30 minutes prior to initiating food or medications. -       Went over a bland diet and hydration   3. Gastroesophageal reflux disease with esophagitis, unspecified whether hemorrhage  -     famotidine (PEPCID) 20 mg tablet; Take 1 Tablet by mouth two (2) times a day. Patient was suggested to seek ER attention if she was not able to keep down food and fluids and her BP remained orthostatic. Follow-up and Dispositions    · Return if symptoms worsen or fail to improve, for Follow up. Subjective:     Patient is seen with . Reports that since last week patient has been having nausea and vomiting. Has also been having on going issues with ORTHO static BP and currently is seeking cardiology's next steps.  reports that this is not a new issue but has its flares. Has been giving her Pedialyte and smart water. Often reports that he is giving Zofran Once and day and throughout the day at times. Does not give it to her before anything is induced, it is always when she is ok or after. Was able to keep a few bites of chicken noodle soup down. Has not had anything that is with bulking. Denies any fever, chills, cough. Is having some diarrhea on and off. But it is not current. Denies any blood in the stool. Also reports a burning sensation to the throat.  belching makes it better. Prior to Admission medications    Medication Sig Start Date End Date Taking? Authorizing Provider   famotidine (PEPCID) 20 mg tablet Take 1 Tablet by mouth two (2) times a day. 9/9/21  Yes Rodolfo Parham NP   ondansetron (ZOFRAN ODT) 4 mg disintegrating tablet Take 1 Tablet by mouth every eight (8) hours as needed for Nausea or Vomiting. 9/9/21  Yes Rodolfo Parham NP   fludrocortisone (FLORINEF) 0.1 mg tablet Take 1 Tablet by mouth daily. 9/7/21  Yes Nieves Diaz MD   carbidopa-levodopa (SINEMET)  mg per tablet 1.5 tablets every 4 hours during the day, total of 5 times daily 7/20/21  Yes Bri Walter MD   busPIRone (BUSPAR) 15 mg tablet Take 1 Tablet by mouth three (3) times daily. 7/16/21  Yes Julius Dickson DO   QUEtiapine (SEROquel) 25 mg tablet Take 1 Tablet by mouth nightly. 7/8/21  Yes Sachi Anthony DO   sodium chloride 1 gram tablet Take 1 Tablet by mouth two (2) times a day. Patient taking differently: Take 1 g by mouth daily. 7/8/21  Yes Julius Dickson DO   diclofenac (Voltaren) 1 % gel Apply  to affected area four (4) times daily. Yes Provider, Historical   glucosamine sulfate 1,000 mg cap Take 500 mg by mouth. Yes Provider, Historical   sertraline (ZOLOFT) 100 mg tablet Take 1 Tablet by mouth daily. 5/24/21  Yes Sachi Anthony DO   traZODone (DESYREL) 50 mg tablet Take 1 Tablet by mouth nightly. 5/24/21  Yes Julius Dickson DO   potassium chloride (K-DUR, KLOR-CON) 20 mEq tablet Take 1 Tablet by mouth two (2) times a day. 5/24/21  Yes Julius Dickson DO   lidocaine (LIDODERM) 5 % Apply patch to the affected area/lower back/coccyx  for 12 hours a day and remove for 12 hours a day. 5/24/21  Yes Julius Dickson,    calcium citrate-vitamin D3 1,000 mg-10 mcg /30 mL liqd Take  by mouth. Yes Provider, Historical   acetaminophen (Tylenol Extra Strength) 500 mg tablet Take  by mouth every six (6) hours as needed for Pain.    Yes Provider, Historical   pantoprazole (PROTONIX) 40 mg tablet Take 1 Tab by mouth daily. 2/2/21  Yes Mirshahi, Lynn Crigler,    cholecalciferol (VITAMIN D3) 1,000 unit tablet Take 2,000 Units by mouth daily. Yes Provider, Historical   ondansetron (ZOFRAN ODT) 4 mg disintegrating tablet Take 1 Tablet by mouth every eight (8) hours as needed for Nausea or Vomiting. 8/2/21 9/9/21  Darcie Frausto DO   polyethylene glycol (MIRALAX) 17 gram/dose powder Take 17 g by mouth two (2) times a day.   Patient not taking: Reported on 9/9/2021 7/8/21   Darcie Frausto DO     Patient Active Problem List   Diagnosis Code    Essential hypertension I10    Hypokalemia E87.6    CAD (coronary artery disease) I25.10    Hypercholesteremia E78.00    Vitamin D deficiency E55.9    Onychomycosis of toenail B35.1    Chronic leg pain M79.606, G89.29    Right LBP M54.5    Sacroiliac joint dysfunction of right side M53.3    Right buttock pain M79.18    Right leg pain M79.604    Trochanteric bursitis M70.60    Gluteal tendinitis M76.00    Gait instability R26.81    Cerebral atrophy, mild (HCC) G31.9    Weakness generalized R53.1    Sleep disorder, unspecified G47.9    Fidgeting R45.89    Neurological disorder G98.8    Frequent falls R29.6    Parkinson's syndrome (HCC) G20    Chronic depression F32.9    Anxiety F41.9    Iron deficiency anemia, unspecified iron deficiency anemia type D50.9    Gastroesophageal reflux disease without esophagitis K21.9    H/O shoulder surgery Z98.890    Hyponatremia E87.1    Orthostatic hypotension I95.1    Chronic insomnia F51.04    Closed fracture of sacrum, unspecified portion of sacrum, sequela S32.10XS     Patient Active Problem List    Diagnosis Date Noted    Closed fracture of sacrum, unspecified portion of sacrum, sequela 06/15/2021    Chronic insomnia 05/24/2021    Orthostatic hypotension 05/10/2021    Hyponatremia 02/26/2021    Parkinson's syndrome ([address] 02/02/2021    Chronic depression 02/02/2021    Anxiety 02/02/2021    Iron deficiency anemia, unspecified iron deficiency anemia type 02/02/2021    Gastroesophageal reflux disease without esophagitis 02/02/2021    H/O shoulder surgery 02/02/2021    Frequent falls 04/04/2017    Neurological disorder 02/03/2017    Sleep disorder, unspecified 01/09/2017    Fidgeting 01/09/2017    Gait instability 01/08/2016    Cerebral atrophy, mild (HCC) 01/08/2016    Weakness generalized 01/08/2016    Trochanteric bursitis 11/18/2014    Gluteal tendinitis 11/18/2014    Right buttock pain 10/21/2014    Right leg pain 10/21/2014    Right LBP 04/01/2014    Sacroiliac joint dysfunction of right side 04/01/2014    Onychomycosis of toenail 10/15/2012    Chronic leg pain 10/15/2012    Vitamin D deficiency 03/26/2012    Essential hypertension 12/16/2009    Hypokalemia 12/16/2009    CAD (coronary artery disease) 12/16/2009    Hypercholesteremia 12/16/2009     Current Outpatient Medications   Medication Sig Dispense Refill    famotidine (PEPCID) 20 mg tablet Take 1 Tablet by mouth two (2) times a day. 60 Tablet 0    ondansetron (ZOFRAN ODT) 4 mg disintegrating tablet Take 1 Tablet by mouth every eight (8) hours as needed for Nausea or Vomiting. 30 Tablet 5    fludrocortisone (FLORINEF) 0.1 mg tablet Take 1 Tablet by mouth daily. 90 Tablet 1    carbidopa-levodopa (SINEMET)  mg per tablet 1.5 tablets every 4 hours during the day, total of 5 times daily 675 Tablet 1    busPIRone (BUSPAR) 15 mg tablet Take 1 Tablet by mouth three (3) times daily. 90 Tablet 5    QUEtiapine (SEROquel) 25 mg tablet Take 1 Tablet by mouth nightly. 90 Tablet 1    sodium chloride 1 gram tablet Take 1 Tablet by mouth two (2) times a day. (Patient taking differently: Take 1 g by mouth daily.) 60 Tablet 2    diclofenac (Voltaren) 1 % gel Apply  to affected area four (4) times daily.  glucosamine sulfate 1,000 mg cap Take 500 mg by mouth.       sertraline (ZOLOFT) 100 mg tablet Take 1 Tablet by mouth daily. 90 Tablet 1    traZODone (DESYREL) 50 mg tablet Take 1 Tablet by mouth nightly. 90 Tablet 1    potassium chloride (K-DUR, KLOR-CON) 20 mEq tablet Take 1 Tablet by mouth two (2) times a day. 180 Tablet 1    lidocaine (LIDODERM) 5 % Apply patch to the affected area/lower back/coccyx  for 12 hours a day and remove for 12 hours a day. 10 Each 5    calcium citrate-vitamin D3 1,000 mg-10 mcg /30 mL liqd Take  by mouth.  acetaminophen (Tylenol Extra Strength) 500 mg tablet Take  by mouth every six (6) hours as needed for Pain.  pantoprazole (PROTONIX) 40 mg tablet Take 1 Tab by mouth daily. 90 Tab 1    cholecalciferol (VITAMIN D3) 1,000 unit tablet Take 2,000 Units by mouth daily.  polyethylene glycol (MIRALAX) 17 gram/dose powder Take 17 g by mouth two (2) times a day. (Patient not taking: Reported on 2021) 595 g 1     Allergies   Allergen Reactions    Pcn [Penicillins] Swelling     Past Medical History:   Diagnosis Date    CAD (coronary artery disease)     Hypertension     Hypokalaemia     Sacrum and coccyx fracture ([address] 2021     Past Surgical History:   Procedure Laterality Date    HX APPENDECTOMY      HX OOPHORECTOMY Right     HX SHOULDER REPLACEMENT      HX TONSILLECTOMY      IR DRAIN HEMATOMA SEROMA FLUID       Family History   Problem Relation Age of Onset    Diabetes Mother      Social History     Tobacco Use    Smoking status: Former Smoker     Packs/day: 0.50     Quit date: 10/19/2011     Years since quittin.8    Smokeless tobacco: Never Used   Substance Use Topics    Alcohol use: Not Currently     Alcohol/week: 1.0 - 2.0 standard drinks     Types: 1 - 2 Standard drinks or equivalent per week     Comment: one drink per week       Review of Systems   Constitutional: Negative. HENT: Negative. Respiratory: Negative. Cardiovascular: Negative. Gastrointestinal: Positive for diarrhea, heartburn, nausea and vomiting.  Negative for abdominal pain and blood in stool. Musculoskeletal: Positive for joint pain. Negative for falls. Neurological: Positive for headaches. Negative for dizziness. Objective:     Patient-Reported Vitals 9/9/2021   Patient-Reported Weight 122lb   Patient-Reported Pulse -   Patient-Reported Temperature -   Patient-Reported Systolic  87   Patient-Reported Diastolic 42   Patient-Reported LMP -        [INSTRUCTIONS:  \"[x]\" Indicates a positive item  \"[]\" Indicates a negative item  -- DELETE ALL ITEMS NOT EXAMINED]    Constitutional: [x] Appears well-developed and well-nourished [x] No apparent distress      [] Abnormal -     Mental status: [x] Alert and awake  [x] Oriented to person/place/time [x] Able to follow commands    [] Abnormal -     Eyes:   EOM    [x]  Normal    [] Abnormal -   Sclera  [x]  Normal    [] Abnormal -          Discharge [x]  None visible   [] Abnormal -     HENT: [x] Normocephalic, atraumatic  [] Abnormal -   [x] Mouth/Throat: Mucous membranes are moist    External Ears [x] Normal  [] Abnormal -    Neck: [x] No visualized mass [] Abnormal -     Pulmonary/Chest: [x] Respiratory effort normal   [x] No visualized signs of difficulty breathing or respiratory distress        [] Abnormal -      Musculoskeletal:   [x] Normal gait with no signs of ataxia         [x] Normal range of motion of neck        [] Abnormal -     Neurological:        [x] No Facial Asymmetry (Cranial nerve 7 motor function) (limited exam due to video visit)          [x] No gaze palsy        [] Abnormal -          Skin:        [x] No significant exanthematous lesions or discoloration noted on facial skin         [] Abnormal -            Psychiatric:       [x] Normal Affect [] Abnormal -        [x] No Hallucinations    Other pertinent observable physical exam findings:-        We discussed the expected course, resolution and complications of the diagnosis(es) in detail.   Medication risks, benefits, costs, interactions, and alternatives were discussed as indicated. I advised her to contact the office if her condition worsens, changes or fails to improve as anticipated. She expressed understanding with the diagnosis(es) and plan. Arianna Ortiz, was evaluated through a synchronous (real-time) audio-video encounter. The patient (or guardian if applicable) is aware that this is a billable service. Verbal consent to proceed has been obtained within the past 12 months. The visit was conducted pursuant to the emergency declaration under the [address] [place] [address] and the [employer] General Act. Patient identification was verified, and a caregiver was present when appropriate. The patient was located in a state where the provider was credentialed to provide care.       Jovanny Frazier NP

## 2021-09-09 NOTE — PROGRESS NOTES
Conor Mejia is a 80 y.o. female    Chief Complaint   Patient presents with    Vomiting     since thursday     Blood Pressure Check     orthostatic    this morning when she stood up, BP dropped    Health Maintenance Due   Topic Date Due    Hepatitis C Screening  Never done    DTaP/Tdap/Td series (1 - Tdap) Never done    Colorectal Cancer Screening Combo  Never done    Shingrix Vaccine Age 50> (1 of 2) Never done    Pneumococcal 65+ years (1 of 1 - PPSV23) Never done    Breast Cancer Screen Mammogram  04/08/2018    Flu Vaccine (1) Never done       There were no vitals taken for this visit. 3 most recent PHQ Screens 5/10/2021   PHQ Not Done -   Little interest or pleasure in doing things Nearly every day   Feeling down, depressed, irritable, or hopeless Nearly every day   Total Score PHQ 2 6   Trouble falling or staying asleep, or sleeping too much -   Feeling tired or having little energy -   Poor appetite, weight loss, or overeating -   Feeling bad about yourself - or that you are a failure or have let yourself or your family down -   Trouble concentrating on things such as school, work, reading, or watching TV -   Moving or speaking so slowly that other people could have noticed; or the opposite being so fidgety that others notice -   Thoughts of being better off dead, or hurting yourself in some way -   How difficult have these problems made it for you to do your work, take care of your home and get along with others -       Fall Risk Assessment, last 12 mths 9/7/2021   Able to walk? Yes   Fall in past 12 months? 0   Do you feel unsteady? 0   Are you worried about falling 0   Is TUG test greater than 12 seconds? -   Is the gait abnormal? -   Number of falls in past 12 months -   Fall with injury? -       Abuse Screening Questionnaire 8/2/2021   Do you ever feel afraid of your partner? N   Are you in a relationship with someone who physically or mentally threatens you?  N   Is it safe for you to go home? Y         1. Have you been to the ER, urgent care clinic since your last visit? Hospitalized since your last visit?no    2. Have you seen or consulted any other health care providers outside of the [address] [place] [address] since your last visit? Include any pap smears or colon screening. yes Dr Alfred Kidney neurology

## 2021-09-14 ENCOUNTER — OFFICE VISIT (OUTPATIENT)
Dept: INTERNAL MEDICINE CLINIC | Age: 71
End: 2021-09-14
Payer: MEDICARE

## 2021-09-14 VITALS
SYSTOLIC BLOOD PRESSURE: 130 MMHG | WEIGHT: 108 LBS | RESPIRATION RATE: 16 BRPM | TEMPERATURE: 97.7 F | BODY MASS INDEX: 17.99 KG/M2 | DIASTOLIC BLOOD PRESSURE: 72 MMHG | HEIGHT: 65 IN | OXYGEN SATURATION: 99 %

## 2021-09-14 DIAGNOSIS — F41.9 ANXIETY: ICD-10-CM

## 2021-09-14 DIAGNOSIS — R07.81 RIB PAIN ON LEFT SIDE: Primary | ICD-10-CM

## 2021-09-14 DIAGNOSIS — R26.81 GAIT INSTABILITY: ICD-10-CM

## 2021-09-14 DIAGNOSIS — F32.A CHRONIC DEPRESSION: ICD-10-CM

## 2021-09-14 DIAGNOSIS — K21.00 GASTROESOPHAGEAL REFLUX DISEASE WITH ESOPHAGITIS, UNSPECIFIED WHETHER HEMORRHAGE: ICD-10-CM

## 2021-09-14 DIAGNOSIS — I95.1 ORTHOSTATIC HYPOTENSION: ICD-10-CM

## 2021-09-14 PROCEDURE — G8427 DOCREV CUR MEDS BY ELIG CLIN: HCPCS | Performed by: INTERNAL MEDICINE

## 2021-09-14 PROCEDURE — G8752 SYS BP LESS 140: HCPCS | Performed by: INTERNAL MEDICINE

## 2021-09-14 PROCEDURE — 1101F PT FALLS ASSESS-DOCD LE1/YR: CPT | Performed by: INTERNAL MEDICINE

## 2021-09-14 PROCEDURE — G8419 CALC BMI OUT NRM PARAM NOF/U: HCPCS | Performed by: INTERNAL MEDICINE

## 2021-09-14 PROCEDURE — G8754 DIAS BP LESS 90: HCPCS | Performed by: INTERNAL MEDICINE

## 2021-09-14 PROCEDURE — 3017F COLORECTAL CA SCREEN DOC REV: CPT | Performed by: INTERNAL MEDICINE

## 2021-09-14 PROCEDURE — 99214 OFFICE O/P EST MOD 30 MIN: CPT | Performed by: INTERNAL MEDICINE

## 2021-09-14 PROCEDURE — G8400 PT W/DXA NO RESULTS DOC: HCPCS | Performed by: INTERNAL MEDICINE

## 2021-09-14 PROCEDURE — G8536 NO DOC ELDER MAL SCRN: HCPCS | Performed by: INTERNAL MEDICINE

## 2021-09-14 PROCEDURE — G0463 HOSPITAL OUTPT CLINIC VISIT: HCPCS | Performed by: INTERNAL MEDICINE

## 2021-09-14 PROCEDURE — 1090F PRES/ABSN URINE INCON ASSESS: CPT | Performed by: INTERNAL MEDICINE

## 2021-09-14 PROCEDURE — G9717 DOC PT DX DEP/BP F/U NT REQ: HCPCS | Performed by: INTERNAL MEDICINE

## 2021-09-14 RX ORDER — LIDOCAINE 50 MG/G
PATCH TOPICAL
Qty: 10 EACH | Refills: 5 | Status: SHIPPED | OUTPATIENT
Start: 2021-09-14 | End: 2022-01-28 | Stop reason: SDUPTHER

## 2021-09-14 RX ORDER — SUCRALFATE 1 G/10ML
1 SUSPENSION ORAL 4 TIMES DAILY
Qty: 414 ML | Refills: 2 | Status: SHIPPED | OUTPATIENT
Start: 2021-09-14 | End: 2021-10-13

## 2021-09-14 NOTE — PROGRESS NOTES
Health Maintenance Due   Topic Date Due    Hepatitis C Screening  Never done    DTaP/Tdap/Td series (1 - Tdap) Never done    Colorectal Cancer Screening Combo  Never done    Shingrix Vaccine Age 50> (1 of 2) Never done    Pneumococcal 65+ years (1 of 1 - PPSV23) Never done    Breast Cancer Screen Mammogram  04/08/2018    Flu Vaccine (1) Never done       Chief Complaint   Patient presents with   Justice See Tong on butt and hurt back, fall was on aug 28th    Back Pain    Pain (Chronic)     3m f/u       1. Have you been to the ER, urgent care clinic since your last visit? Hospitalized since your last visit? No    2. Have you seen or consulted any other health care providers outside of the [address] [place] [address] since your last visit? Include any pap smears or colon screening. No    3) Do you have an Advance Directive on file? no    4) Are you interested in receiving information on Advance Directives? NO      Patient is accompanied by self I have received verbal consent from Lamont Larkin to discuss any/all medical information while they are present in the room.

## 2021-09-15 LAB
BUN SERPL-MCNC: 13 MG/DL (ref 8–27)
BUN/CREAT SERPL: 19 (ref 12–28)
CALCIUM SERPL-MCNC: 9.2 MG/DL (ref 8.7–10.3)
CHLORIDE SERPL-SCNC: 107 MMOL/L (ref 96–106)
CO2 SERPL-SCNC: 22 MMOL/L (ref 20–29)
CREAT SERPL-MCNC: 0.7 MG/DL (ref 0.57–1)
GLUCOSE SERPL-MCNC: 98 MG/DL (ref 65–99)
POTASSIUM SERPL-SCNC: 4.3 MMOL/L (ref 3.5–5.2)
SODIUM SERPL-SCNC: 142 MMOL/L (ref 134–144)

## 2021-09-20 ENCOUNTER — TELEPHONE (OUTPATIENT)
Dept: INTERNAL MEDICINE CLINIC | Age: 71
End: 2021-09-20

## 2021-09-20 DIAGNOSIS — M54.6 THORACIC BACK PAIN, UNSPECIFIED BACK PAIN LATERALITY, UNSPECIFIED CHRONICITY: ICD-10-CM

## 2021-09-20 DIAGNOSIS — R29.6 FREQUENT FALLS: ICD-10-CM

## 2021-09-20 DIAGNOSIS — R07.81 RIB PAIN ON LEFT SIDE: Primary | ICD-10-CM

## 2021-09-21 ENCOUNTER — HOSPITAL ENCOUNTER (OUTPATIENT)
Dept: GENERAL RADIOLOGY | Age: 71
Discharge: HOME | End: 2021-09-21
Payer: MEDICARE

## 2021-09-21 DIAGNOSIS — R07.81 RIB PAIN ON LEFT SIDE: ICD-10-CM

## 2021-09-21 DIAGNOSIS — R29.6 FREQUENT FALLS: ICD-10-CM

## 2021-09-21 DIAGNOSIS — M54.6 THORACIC BACK PAIN, UNSPECIFIED BACK PAIN LATERALITY, UNSPECIFIED CHRONICITY: ICD-10-CM

## 2021-09-21 PROCEDURE — 72072 X-RAY EXAM THORAC SPINE 3VWS: CPT

## 2021-09-21 PROCEDURE — 71046 X-RAY EXAM CHEST 2 VIEWS: CPT

## 2021-09-21 PROCEDURE — 72100 X-RAY EXAM L-S SPINE 2/3 VWS: CPT

## 2021-09-21 NOTE — PROGRESS NOTES
Multilevel thoracic compression fracture of indeterminate age.   Refer to orthopedic spine specialist.

## 2021-09-22 ENCOUNTER — TELEPHONE (OUTPATIENT)
Dept: INTERNAL MEDICINE CLINIC | Age: 71
End: 2021-09-22

## 2021-09-22 DIAGNOSIS — R26.81 GAIT INSTABILITY: ICD-10-CM

## 2021-09-22 DIAGNOSIS — M54.6 THORACIC BACK PAIN, UNSPECIFIED BACK PAIN LATERALITY, UNSPECIFIED CHRONICITY: Primary | ICD-10-CM

## 2021-09-22 NOTE — TELEPHONE ENCOUNTER
Patient's  returning call to get results of xrays from yesterday. PLEase call him back at [phone]

## 2021-09-23 ENCOUNTER — TELEPHONE (OUTPATIENT)
Dept: INTERNAL MEDICINE CLINIC | Age: 71
End: 2021-09-23

## 2021-09-23 NOTE — TELEPHONE ENCOUNTER
Returned call to pts  and advised of imaging results and provided name and number of ortho .        Call also placed to pt at her home and left  for call back

## 2021-09-23 NOTE — TELEPHONE ENCOUNTER
Pt called back, iu advised her of her imaging results and recommendation for ortho, She will call to make appt

## 2021-09-24 DIAGNOSIS — G20 PARKINSON DISEASE (HCC): ICD-10-CM

## 2021-09-24 RX ORDER — CARBIDOPA AND LEVODOPA 25; 100 MG/1; MG/1
TABLET ORAL
Qty: 675 TABLET | Refills: 1 | Status: SHIPPED | OUTPATIENT
Start: 2021-09-24 | End: 2022-01-28 | Stop reason: SDUPTHER

## 2021-09-28 ENCOUNTER — TELEPHONE (OUTPATIENT)
Dept: INTERNAL MEDICINE CLINIC | Age: 71
End: 2021-09-28

## 2021-09-28 NOTE — PROGRESS NOTES
HISTORY OF PRESENT ILLNESS  Dee Hoffman is a 80 y.o. female. Pt. comes in with her  for f/u. PMH includes Parkinson's syndrome with gait instability and HTN with orthostatic hypotension. Vital signs are stable today. Appetite has not been great. Has lost some weight. Saw cardiologist.  Midodrine was stopped. Remains on Florinef. Reports a fall about 2 weeks ago. Having pain on left side of ribs. Also has continued pain on coccyx from previous fracture. Taking Tylenol for pain. Not taking tramadol anymore. Has chronic anxiety and depression. Medications seem to help some. GI issues are relatively stable on Protonix and Pepcid. Depends on her  for many ADLs. Followed by neurologist and remains on Sinemet which has helped some. Has had Covid vaccination. Denies any signs or symptoms of COVID-19. No other acute complaints. Med list and most recent studies reviewed. Labs have been stable. HPI    Review of Systems   Constitutional: Negative. HENT: Negative. Eyes: Negative for blurred vision. Respiratory: Negative for shortness of breath. Cardiovascular: Negative for chest pain and leg swelling. Gastrointestinal: Negative for abdominal pain. Genitourinary: Negative for dysuria and frequency. Musculoskeletal: Positive for back pain, falls and joint pain. Skin: Negative. Neurological: Positive for dizziness, focal weakness (R leg) and weakness. Negative for sensory change and headaches. Psychiatric/Behavioral: Positive for depression and memory loss. Negative for hallucinations, substance abuse and suicidal ideas. The patient is nervous/anxious. The patient does not have insomnia. All other systems reviewed and are negative. Physical Exam  Vitals and nursing note reviewed. Constitutional:       General: She is not in acute distress (with pain). Appearance: She is well-developed. She is diaphoretic.       Comments: Pleasant lady  Using a cane HENT:      Head: Normocephalic and atraumatic. Mouth/Throat:      Mouth: Mucous membranes are moist.   Eyes:      General: No scleral icterus. Conjunctiva/sclera: Conjunctivae normal.   Neck:      Thyroid: No thyromegaly. Vascular: No carotid bruit or JVD. Cardiovascular:      Rate and Rhythm: Normal rate and regular rhythm. Pulses: Normal pulses. Heart sounds: Normal heart sounds. No murmur heard. Pulmonary:      Effort: Pulmonary effort is normal. No respiratory distress. Breath sounds: Normal breath sounds. No wheezing or rales. Abdominal:      General: Bowel sounds are normal. There is no distension. Palpations: Abdomen is soft. Tenderness: There is no abdominal tenderness. There is no right CVA tenderness or left CVA tenderness. Musculoskeletal:         General: Tenderness (Sacral/coccyx area, left lateral ribs, no bruises) present. No deformity or signs of injury. Cervical back: Normal range of motion and neck supple. No rigidity. Right lower leg: No edema. Left lower leg: No edema. Comments: djd  Weak legs with muscle atrophy   Lymphadenopathy:      Cervical: No cervical adenopathy. Skin:     General: Skin is warm. Findings: No rash. Neurological:      Mental Status: She is alert and oriented to person, place, and time. Cranial Nerves: No cranial nerve deficit. Coordination: Coordination abnormal.      Gait: Gait abnormal.   Psychiatric:         Behavior: Behavior normal.      Comments: Seems anxious and depressed         ASSESSMENT and PLAN  Diagnoses and all orders for this visit:    1. Rib pain on left side  Tylenol 500 g 1-2 4 times daily as needed  Lidoderm  Apply heat as tolerated  2. Gait instability  Fall precautions discussed    3. Orthostatic hypotension  Stable chronic condition. Continue current treatment/medications. Follow-up with cardiologist as scheduled  4.  Gastroesophageal reflux disease with esophagitis, unspecified whether hemorrhage  Stable chronic condition. Continue current treatment/medications. Add Carafate  5. Anxiety  Continue BuSpar  6. Chronic depression  Continue Zoloft and Seroquel  Other orders  -     lidocaine (LIDODERM) 5 %; Apply patch to the affected area/L posterior rib area for 12 hours a day and remove for 12 hours a day. Dx: R07.81  -     sucralfate (Carafate) 100 mg/mL suspension; Take 10 mL by mouth four (4) times daily. Follow-up and Dispositions    · Return in about 3 months (around 12/14/2021). All chronic medical problems are stable  Continue with current medical management and plan  lab results and schedule of future lab studies reviewed with patient  reviewed diet, exercise and weight control  reviewed medications and side effects in detail  F/u with other MD's/ providers as scheduled  COVID-19 precautions discussed with pt  An After Visit Summary was printed and given to the patient.

## 2021-09-28 NOTE — TELEPHONE ENCOUNTER
Marybeth from [facility] called requesting pts last OV notes and Imaging results be faxed over to their office in regards to a recent referral.     Info was faxed to [phone]  Best contact number [phone] ext.  [address]

## 2021-09-29 ENCOUNTER — TELEPHONE (OUTPATIENT)
Dept: INTERNAL MEDICINE CLINIC | Age: 71
End: 2021-09-29

## 2021-09-29 NOTE — TELEPHONE ENCOUNTER
Call placed to pt. We received denial letter for insurance to cover Sucralfate suspension .    Left VM for pt to call back

## 2021-09-30 DIAGNOSIS — M54.6 THORACIC BACK PAIN, UNSPECIFIED BACK PAIN LATERALITY, UNSPECIFIED CHRONICITY: Primary | ICD-10-CM

## 2021-09-30 RX ORDER — ACETAMINOPHEN AND CODEINE PHOSPHATE 300; 30 MG/1; MG/1
1 TABLET ORAL
Qty: 30 TABLET | Refills: 0 | Status: SHIPPED | OUTPATIENT
Start: 2021-09-30 | End: 2021-10-18 | Stop reason: SDUPTHER

## 2021-10-03 DIAGNOSIS — F51.04 CHRONIC INSOMNIA: ICD-10-CM

## 2021-10-03 DIAGNOSIS — K21.00 GASTROESOPHAGEAL REFLUX DISEASE WITH ESOPHAGITIS, UNSPECIFIED WHETHER HEMORRHAGE: ICD-10-CM

## 2021-10-04 RX ORDER — FAMOTIDINE 20 MG/1
TABLET, FILM COATED ORAL
Qty: 60 TABLET | Refills: 0 | Status: SHIPPED | OUTPATIENT
Start: 2021-10-04 | End: 2021-12-29

## 2021-10-04 RX ORDER — FAMOTIDINE 20 MG/1
20 TABLET, FILM COATED ORAL 2 TIMES DAILY
Qty: 60 TABLET | Refills: 0 | Status: SHIPPED | OUTPATIENT
Start: 2021-10-04 | End: 2021-10-13

## 2021-10-05 RX ORDER — QUETIAPINE FUMARATE 25 MG/1
25 TABLET, FILM COATED ORAL
Qty: 90 TABLET | Refills: 1 | Status: SHIPPED | OUTPATIENT
Start: 2021-10-05 | End: 2022-04-05

## 2021-10-12 NOTE — PROGRESS NOTES
Hodan Ochoa     1950         Date of Visit-10/13/2021     [facility] [place]  [facility]      PCP is Hayde Blunt DO   Cardiologist: Jayant Larios. Miriam WILKS, [facility]      HPI:  Hodan Ochoa is a 80 y.o. female with orthostatic hypotension    She continues to have elevated BP readings and very low BP readings  Lowest SBP 60mmHg, highest SBP 190mmHg upon review of home readings  BP seems to be high at night, low in the morning per her    Says she can tell when her BP is out of range, gets some head pounding   Frequently has dizziness or lightheadedness  No recent syncope but continues to have falls   She denies any palpitations or chest pain   Sometimes she will have resting dyspnea with BP fluctuations  No LE edema   Patient's  reports that her florinef was stopped about a month ago by Dr. Ameena Mustafa  Has been off midodrine for some time   She is only taking one sodium chloride tablet daily   She salts her food a lot   Had some hyponatremia earlier this year so she cut back her fluid intake and is now drinking mostly smartwater and gatorade, a lengthy discussion ensued about how much fluid she is drinking daily and it sounds like overall she only drinks about 28 oz of fluid daily, encouraged her to increase her fluid intake   She has Parkinson's and is followed by Dr. Rosangela Jean at [facility], sees her again in November   Sees Dr. Ameena Mustafa again in December  Uses [facility] for mail order Rx   Pt is in a wheelchair - she has had significant trouble walking recently and staying balanced      Assessment/Plan:     The primary encounter diagnosis was Parkinson's disease ([address] Diagnoses of Hyponatremia, Orthostatic hypotension, HTN (hypertension), benign, and Shortness of breath were also pertinent to this visit.      1.  Orthostatic hypotension - now off midodrine and more recently florinef, wide fluctuations in BP as above, takes one sodium chloride tab daily and salts food regularly, advised her to increase her fluid intake to 40 oz daily, will check BMP and Mg level in 1 month due to her concern over electrolyte abnormalities with too much fluid intake  -will discuss possibly starting Northera for her autonomic dysfunction with Dr. Abhijit Loius and will communicate his decision to patient via [employer]  -tentatively scheduled her to follow up with him again in January 2022    2. HTN - complicated by #1, with episodes of dyspnea and severe HTN will repeat TTE to assess EF, LVH and valves    3. Parkinson's disease - followed by [facility] neurologist Dr. Harriet Serrato     4. Hyponatremia - will check BMP in 1 month       Patient Instructions   Please increase your fluid intake to at least 40 oz daily - smartwater and gatorade to prevent electrolyte issues    Please make sure to drink 8 oz of fluid first thing in the morning and hydrate before bedtime     Please have your labs drawn at [facility] in 1 month     If you do not hear back from me by the end of next week please call the office or send a [employer] message          Impression:   1. Parkinson's disease ([address]    2. Hyponatremia    3. Orthostatic hypotension    4. HTN (hypertension), benign    5. Shortness of breath       Cardiac History:   No specialty comments available. Future Appointments   Date Time Provider Sarah Jhaveri   10/27/2021  4:00 PM ALE [facility]   1/13/2022  4:00 PM Vinnie Pineda MD [facility]      Future Appointments   Date Time Provider Sarah Jhaveri   10/27/2021  4:00 PM [facility]   1/13/2022  4:00 PM Vinnie Pineda MD [facility]      ROS-declined full form -except as noted above. . A complete cardiac and respiratory are reviewed and negative except as above ; Resp-denies wheezing  or productive cough,.  Const- No unusual weight loss or fever; Neuro-no recent seizure or CVA ; GI- No BRBPR, abdom pain, bloating ; - no  hematuria Past Medical History:   Diagnosis Date    CAD (coronary artery disease)     Hypertension     Hypokalaemia     Sacrum and coccyx fracture ([address] 06/05/2021      Social Hx= reports that she quit smoking about 9 years ago. She smoked 0.50 packs per day. She has never used smokeless tobacco. She reports previous alcohol use of about 1.0 - 2.0 standard drinks of alcohol per week. She reports that she does not use drugs. family history includes Diabetes in her mother. Allergies   Allergen Reactions    Pcn [Penicillins] Swelling      Exam and Labs:  BP (!) 138/96 (BP 1 Location: Right arm, BP Patient Position: Standing, BP Cuff Size: Adult)   Pulse 63   Resp 16   Ht 5' 5\" (1.651 m)   Wt 118 lb (53.5 kg)   SpO2 98%   BMI 19.64 kg/m² Constitutional:  NAD, comfortable  Head: NC,AT. Eyes: No scleral icterus. Neck:  Neck supple. No JVD present. Throat: moist mucous membranes. Chest: Effort normal & normal respiratory excursion . Neurological: alert, conversant and oriented . Skin: Skin is not cold. No obvious systemic rash noted. Not diaphoretic. No erythema. Psychiatric:  Grossly normal mood and affect. Behavior appears normal. Extremities:  no clubbing or cyanosis. Abdomen: non distended    Lungs:breath sounds normal. No stridor. distress, wheezes or  Rales. Heart: normal rate, regular rhythm, normal S1, S2, no murmurs, rubs, clicks or gallops , PMI non displaced. Edema: Edema is none.     Lab Results   Component Value Date/Time    Cholesterol, total 200 (H) 06/15/2021 04:04 PM    HDL Cholesterol 104 06/15/2021 04:04 PM    LDL, calculated 78 06/15/2021 04:04 PM    LDL, calculated 117 (H) 04/04/2017 10:49 AM    Triglyceride 108 06/15/2021 04:04 PM    CHOL/HDL Ratio 2.6 05/10/2010 09:42 AM     Lab Results   Component Value Date/Time    Sodium 142 09/14/2021 07:43 AM    Potassium 4.3 09/14/2021 07:43 AM    Chloride 107 (H) 09/14/2021 07:43 AM    CO2 22 09/14/2021 07:43 AM    Anion gap 9 07/13/2017 06:28 AM    Glucose 98 09/14/2021 07:43 AM    BUN 13 09/14/2021 07:43 AM    Creatinine 0.70 09/14/2021 07:43 AM    BUN/Creatinine ratio 19 09/14/2021 07:43 AM    GFR est  09/14/2021 07:43 AM    GFR est non-AA 88 09/14/2021 07:43 AM    Calcium 9.2 09/14/2021 07:43 AM      Wt Readings from Last 3 Encounters:   10/13/21 118 lb (53.5 kg)   09/14/21 108 lb (49 kg)   09/07/21 123 lb (55.8 kg)      BP Readings from Last 3 Encounters:   10/13/21 (!) 138/96   09/14/21 130/72   09/07/21 (!) 150/90      Current Outpatient Medications   Medication Sig    QUEtiapine (SEROquel) 25 mg tablet Take 1 Tablet by mouth nightly.  famotidine (PEPCID) 20 mg tablet Take 1 tablet by mouth twice daily    carbidopa-levodopa (SINEMET)  mg per tablet 1.5 tablets every 4 hours during the day, total of 5 times daily    lidocaine (LIDODERM) 5 % Apply patch to the affected area/L posterior rib area for 12 hours a day and remove for 12 hours a day. Dx: R07.81    ondansetron (ZOFRAN ODT) 4 mg disintegrating tablet Take 1 Tablet by mouth every eight (8) hours as needed for Nausea or Vomiting.  busPIRone (BUSPAR) 15 mg tablet Take 1 Tablet by mouth three (3) times daily.  polyethylene glycol (MIRALAX) 17 gram/dose powder Take 17 g by mouth two (2) times a day.  sodium chloride 1 gram tablet Take 1 Tablet by mouth two (2) times a day. (Patient taking differently: Take 1 g by mouth daily.)    diclofenac (Voltaren) 1 % gel Apply  to affected area four (4) times daily.  glucosamine sulfate 1,000 mg cap Take 500 mg by mouth.  sertraline (ZOLOFT) 100 mg tablet Take 1 Tablet by mouth daily.  traZODone (DESYREL) 50 mg tablet Take 1 Tablet by mouth nightly.  potassium chloride (K-DUR, KLOR-CON) 20 mEq tablet Take 1 Tablet by mouth two (2) times a day.  calcium citrate-vitamin D3 1,000 mg-10 mcg /30 mL liqd Take  by mouth.     acetaminophen (Tylenol Extra Strength) 500 mg tablet Take  by mouth every six (6) hours as needed for Pain.  pantoprazole (PROTONIX) 40 mg tablet Take 1 Tab by mouth daily.  cholecalciferol (VITAMIN D3) 1,000 unit tablet Take 2,000 Units by mouth daily. No current facility-administered medications for this visit.      RONNI Mullen, [address] [facility]  [facility] [address] , [address] [facility]  [address]  () [phone] (DN) [phone]

## 2021-10-13 ENCOUNTER — OFFICE VISIT (OUTPATIENT)
Dept: CARDIOLOGY CLINIC | Age: 71
End: 2021-10-13
Payer: MEDICARE

## 2021-10-13 VITALS
WEIGHT: 118 LBS | SYSTOLIC BLOOD PRESSURE: 138 MMHG | DIASTOLIC BLOOD PRESSURE: 96 MMHG | BODY MASS INDEX: 19.66 KG/M2 | OXYGEN SATURATION: 98 % | HEART RATE: 63 BPM | HEIGHT: 65 IN | RESPIRATION RATE: 16 BRPM

## 2021-10-13 DIAGNOSIS — E87.1 HYPONATREMIA: ICD-10-CM

## 2021-10-13 DIAGNOSIS — I95.1 ORTHOSTATIC HYPOTENSION: ICD-10-CM

## 2021-10-13 DIAGNOSIS — I10 HTN (HYPERTENSION), BENIGN: ICD-10-CM

## 2021-10-13 DIAGNOSIS — G20 PARKINSON'S DISEASE (HCC): Primary | ICD-10-CM

## 2021-10-13 DIAGNOSIS — R06.02 SHORTNESS OF BREATH: ICD-10-CM

## 2021-10-13 PROCEDURE — 99214 OFFICE O/P EST MOD 30 MIN: CPT | Performed by: NURSE PRACTITIONER

## 2021-10-13 PROCEDURE — G8754 DIAS BP LESS 90: HCPCS | Performed by: NURSE PRACTITIONER

## 2021-10-13 PROCEDURE — G8420 CALC BMI NORM PARAMETERS: HCPCS | Performed by: NURSE PRACTITIONER

## 2021-10-13 PROCEDURE — 3017F COLORECTAL CA SCREEN DOC REV: CPT | Performed by: NURSE PRACTITIONER

## 2021-10-13 PROCEDURE — 1090F PRES/ABSN URINE INCON ASSESS: CPT | Performed by: NURSE PRACTITIONER

## 2021-10-13 PROCEDURE — G8536 NO DOC ELDER MAL SCRN: HCPCS | Performed by: NURSE PRACTITIONER

## 2021-10-13 PROCEDURE — G8400 PT W/DXA NO RESULTS DOC: HCPCS | Performed by: NURSE PRACTITIONER

## 2021-10-13 PROCEDURE — 1101F PT FALLS ASSESS-DOCD LE1/YR: CPT | Performed by: NURSE PRACTITIONER

## 2021-10-13 PROCEDURE — G8427 DOCREV CUR MEDS BY ELIG CLIN: HCPCS | Performed by: NURSE PRACTITIONER

## 2021-10-13 PROCEDURE — G9717 DOC PT DX DEP/BP F/U NT REQ: HCPCS | Performed by: NURSE PRACTITIONER

## 2021-10-13 PROCEDURE — G0463 HOSPITAL OUTPT CLINIC VISIT: HCPCS | Performed by: NURSE PRACTITIONER

## 2021-10-13 PROCEDURE — G8752 SYS BP LESS 140: HCPCS | Performed by: NURSE PRACTITIONER

## 2021-10-13 NOTE — PATIENT INSTRUCTIONS
Please increase your fluid intake to at least 40 oz daily - smartwater and gatorade to prevent electrolyte issues    Please make sure to drink 8 oz of fluid first thing in the morning and hydrate before bedtime     Please have your labs drawn at [facility] in 1 month     If you do not hear back from me by the end of next week please call the office or send a [employer] message

## 2021-10-14 ENCOUNTER — PATIENT MESSAGE (OUTPATIENT)
Dept: CARDIOLOGY CLINIC | Age: 71
End: 2021-10-14

## 2021-10-14 DIAGNOSIS — I95.1 ORTHOSTATIC HYPOTENSION: Primary | ICD-10-CM

## 2021-10-14 RX ORDER — DROXIDOPA 100 MG/1
100 CAPSULE ORAL 3 TIMES DAILY
Qty: 270 CAPSULE | Refills: 0 | Status: SHIPPED | OUTPATIENT
Start: 2021-10-14 | End: 2021-11-08

## 2021-10-14 NOTE — PROGRESS NOTES
Spoke with Dr. Bernardo Pope   Will start Selene Vernon for orthostatic hypotension - will send patient a [employer] message about this    Sherice Gonzalez - please schedule her for follow up with him the first week of November  Keep appt in January for now     Future Appointments   Date Time Provider Sarah Jhaveri   10/27/2021  4:00 PM [facility]   1/13/2022  4:00 PM Vinnie Pineda MD [facility]

## 2021-10-14 NOTE — PROGRESS NOTES
10/14/2021 at 4:55 spoke with patient advised up scheduled appointment with Dr. Ceasar Rice on 11/8/2021    Future Appointments   Date Time Provider Sarah Jhaveri   10/27/2021  4:00 PM [facility]   11/8/2021  1:40 PM Vinnie Pineda MD [facility]   1/13/2022  4:00 PM Vinnie Pineda MD [facility]

## 2021-10-18 DIAGNOSIS — E86.1 FLUID DEFICIT: ICD-10-CM

## 2021-10-18 DIAGNOSIS — M54.6 THORACIC BACK PAIN, UNSPECIFIED BACK PAIN LATERALITY, UNSPECIFIED CHRONICITY: ICD-10-CM

## 2021-10-19 RX ORDER — ACETAMINOPHEN AND CODEINE PHOSPHATE 300; 30 MG/1; MG/1
1 TABLET ORAL
Qty: 30 TABLET | Refills: 0 | Status: SHIPPED | OUTPATIENT
Start: 2021-10-19 | End: 2021-10-29

## 2021-10-19 RX ORDER — SODIUM CHLORIDE TAB 1 GM 1 G
1 TAB MISCELLANEOUS 2 TIMES DAILY
Qty: 60 TABLET | Refills: 2 | Status: SHIPPED | OUTPATIENT
Start: 2021-10-19 | End: 2022-04-05

## 2021-10-22 NOTE — PROGRESS NOTES
Shalonda I agree we should try Yuko Page can you help us with the initiation dosing and prescriptions thanks

## 2021-11-08 ENCOUNTER — OFFICE VISIT (OUTPATIENT)
Dept: CARDIOLOGY CLINIC | Age: 71
End: 2021-11-08
Payer: MEDICARE

## 2021-11-08 VITALS
HEART RATE: 90 BPM | DIASTOLIC BLOOD PRESSURE: 80 MMHG | HEIGHT: 65 IN | WEIGHT: 118 LBS | BODY MASS INDEX: 19.66 KG/M2 | OXYGEN SATURATION: 95 % | SYSTOLIC BLOOD PRESSURE: 130 MMHG | RESPIRATION RATE: 16 BRPM

## 2021-11-08 DIAGNOSIS — E87.1 HYPONATREMIA: ICD-10-CM

## 2021-11-08 DIAGNOSIS — R29.6 FREQUENT FALLS: ICD-10-CM

## 2021-11-08 DIAGNOSIS — R03.0 ELEVATED BP WITHOUT DIAGNOSIS OF HYPERTENSION: ICD-10-CM

## 2021-11-08 DIAGNOSIS — G20 PARKINSON'S DISEASE (HCC): ICD-10-CM

## 2021-11-08 DIAGNOSIS — I95.1 ORTHOSTATIC HYPOTENSION: Primary | ICD-10-CM

## 2021-11-08 PROCEDURE — G9717 DOC PT DX DEP/BP F/U NT REQ: HCPCS | Performed by: SPECIALIST

## 2021-11-08 PROCEDURE — G0463 HOSPITAL OUTPT CLINIC VISIT: HCPCS | Performed by: SPECIALIST

## 2021-11-08 PROCEDURE — G8427 DOCREV CUR MEDS BY ELIG CLIN: HCPCS | Performed by: SPECIALIST

## 2021-11-08 PROCEDURE — G8420 CALC BMI NORM PARAMETERS: HCPCS | Performed by: SPECIALIST

## 2021-11-08 PROCEDURE — 3017F COLORECTAL CA SCREEN DOC REV: CPT | Performed by: SPECIALIST

## 2021-11-08 PROCEDURE — 1101F PT FALLS ASSESS-DOCD LE1/YR: CPT | Performed by: SPECIALIST

## 2021-11-08 PROCEDURE — G8400 PT W/DXA NO RESULTS DOC: HCPCS | Performed by: SPECIALIST

## 2021-11-08 PROCEDURE — G8536 NO DOC ELDER MAL SCRN: HCPCS | Performed by: SPECIALIST

## 2021-11-08 PROCEDURE — 1090F PRES/ABSN URINE INCON ASSESS: CPT | Performed by: SPECIALIST

## 2021-11-08 PROCEDURE — G8754 DIAS BP LESS 90: HCPCS | Performed by: SPECIALIST

## 2021-11-08 PROCEDURE — G8752 SYS BP LESS 140: HCPCS | Performed by: SPECIALIST

## 2021-11-08 PROCEDURE — 99214 OFFICE O/P EST MOD 30 MIN: CPT | Performed by: SPECIALIST

## 2021-11-08 NOTE — PROGRESS NOTES
Hailey Cuadra     1950       Vinnie Louis MD, [facility]  Date of Visit-11/8/2021   PCP is Carmelita Babinski, DO [facility] [place]  [facility]  HPI:  Hailey Cuadra is a 80 y.o. female   2 month f/u of   hypertension and orthostatic hypotension. Was on Midodrine and switched to Florinef. She had an echo in 2017 that was normal.   At that time she was hospitalized for falls and confusion. She also had hyponatremia. Reviewing past several BP's she varies from 116 to 150 in the office with a pulse varying also from 60 to 105. She takes Florinef 0.1 BID. Today. .. Switched over to salt tablets and we discussed Northera. Pt is accompanied by her . Her  reports episodes of near-syncope. Her  says that she complains of extreme SOB, and her legs experiencing some pain. Pt states that she wakes up in the middle of the night and her throat feels \"full\" and notes she cannot get a full breath. Pt notes that her BP drops and then \"comes back quicker\". She reports her Parkinson's affects her, and that it is random where her BP drops and raises high. She says that she gets off balance sometimes, and is able to help brace when she falls, and can decrease the risk of injury by trying to catch herself. Denies chest pain, edema, syncope or shortness of breath at rest, has no tachycardia, palpitations or sense of arrhythmia. Assessment/Plan:     Patient Instructions   Please stop your Northera. Please take sodium twice a day and you may take an extra at night if BP is still low. We will call with results of your echocardiogram.        1. Orthostatic hypotension  Pt has hypotension as part of her parkinson's. She is not on florinef and midodrine and not clear as to why. Her  is not sure but thought it raised the BP. I explained that that was the goal of the meds.  Main problem for BP right now is low BP at 5 in the morning. I told her to increase salt twice a day and more in the evening. I need to talk to Dr. Tonya Multani if neuro about using Northera with Sinemet so we will hold off on the Northera. We will see what the echo shows and call with results. Not sure there is much more I have to offer. He is increase her p.o. intake and salt intake  2. Elevated BP without diagnosis of hypertension  Wide fluctuations in blood pressure which probably relate to both underlying Parkinson's and back pain she has multiple musculoskeletal complaints. It is unclear if she has baseline hypertension. Its important in patients with labile hypertension that can get hypotensive to avoid hypotension because of the risk of falls. This is true even if there are periods of time with higher blood pressure    BP Readings from Last 6 Encounters:   11/08/21 130/80   10/13/21 (!) 138/96   09/14/21 130/72   09/07/21 (!) 150/90   06/15/21 120/80   04/01/21 118/72        3. Hyponatremia  Stable  Lab Results   Component Value Date/Time    Sodium 142 09/14/2021 07:43 AM    Potassium 4.3 09/14/2021 07:43 AM    Chloride 107 (H) 09/14/2021 07:43 AM    CO2 22 09/14/2021 07:43 AM    Anion gap 9 07/13/2017 06:28 AM    Glucose 98 09/14/2021 07:43 AM    BUN 13 09/14/2021 07:43 AM    Creatinine 0.70 09/14/2021 07:43 AM    BUN/Creatinine ratio 19 09/14/2021 07:43 AM    GFR est  09/14/2021 07:43 AM    GFR est non-AA 88 09/14/2021 07:43 AM    Calcium 9.2 09/14/2021 07:43 AM        4. Parkinson's disease ([address]  On sinemet    5. Frequent falls  FU  PRN     Impression:   1. Orthostatic hypotension    2. Elevated BP without diagnosis of hypertension    3. Hyponatremia    4. Parkinson's disease ([address]    5. Frequent falls       Cardiac History:   No specialty comments available.      Future Appointments   Date Time Provider Sarah Jhaveri   11/12/2021  4:00 PM Nayana COPE   12/14/2021  8:30 AM JORGE Yao AMB   1/13/2022 4:00 PM Vinnie Pineda MD [facility]        ROS-except as noted above. . A complete cardiac and respiratory are reviewed and negative except as above ; Resp-denies wheezing  or productive cough,. Const- No unusual weight loss or fever; Neuro-no recent seizure or CVA ; GI- No BRBPR, abdom pain, bloating ; - no  hematuria   see supplement sheet, initialed and to be scanned by staff  Past Medical History:   Diagnosis Date    CAD (coronary artery disease)     Hypertension     Hypokalaemia     Sacrum and coccyx fracture ([address] 06/05/2021      Social Hx= reports that she quit smoking about 10 years ago. She smoked 0.50 packs per day. She has never used smokeless tobacco. She reports previous alcohol use of about 1.0 - 2.0 standard drink of alcohol per week. She reports that she does not use drugs. Exam and Labs:  /80 (BP 1 Location: Right arm, BP Patient Position: Lying, BP Cuff Size: Adult)   Pulse 90   Resp 16   Ht 5' 5\" (1.651 m)   Wt 118 lb (53.5 kg)   SpO2 95%   BMI 19.64 kg/m² Constitutional:  NAD, comfortable  Head: NC,AT. Eyes: No scleral icterus. Neck:  Neck supple. No JVD present. Throat: moist mucous membranes. Chest: Effort normal & normal respiratory excursion . Neurological: alert, conversant and oriented . Skin: Skin is not cold. No obvious systemic rash noted. Not diaphoretic. No erythema. Psychiatric:  Grossly normal mood and affect. Behavior appears normal. Extremities:  no clubbing or cyanosis. Abdomen: non distended  Frail chronically weak appearing appearing in a wheelchair  Lungs:breath sounds normal. No stridor. distress, wheezes or  Rales. Heart: normal rate, regular rhythm, normal S1, S2, no murmurs, rubs, clicks or gallops , PMI non displaced. Edema: Edema is none.   Lab Results   Component Value Date/Time    Cholesterol, total 200 (H) 06/15/2021 04:04 PM    HDL Cholesterol 104 06/15/2021 04:04 PM    LDL, calculated 78 06/15/2021 04:04 PM    LDL, calculated 117 (H) 04/04/2017 10:49 AM    Triglyceride 108 06/15/2021 04:04 PM    CHOL/HDL Ratio 2.6 05/10/2010 09:42 AM     Lab Results   Component Value Date/Time    Sodium 142 09/14/2021 07:43 AM    Potassium 4.3 09/14/2021 07:43 AM    Chloride 107 (H) 09/14/2021 07:43 AM    CO2 22 09/14/2021 07:43 AM    Anion gap 9 07/13/2017 06:28 AM    Glucose 98 09/14/2021 07:43 AM    BUN 13 09/14/2021 07:43 AM    Creatinine 0.70 09/14/2021 07:43 AM    BUN/Creatinine ratio 19 09/14/2021 07:43 AM    GFR est  09/14/2021 07:43 AM    GFR est non-AA 88 09/14/2021 07:43 AM    Calcium 9.2 09/14/2021 07:43 AM      Wt Readings from Last 3 Encounters:   11/08/21 118 lb (53.5 kg)   10/13/21 118 lb (53.5 kg)   09/14/21 108 lb (49 kg)      BP Readings from Last 3 Encounters:   11/08/21 130/80   10/13/21 (!) 138/96   09/14/21 130/72      Current Outpatient Medications   Medication Sig    potassium chloride (K-DUR, KLOR-CON) 20 mEq tablet Take 1 tablet by mouth twice daily    sodium chloride 1 gram tablet Take 1 Tablet by mouth two (2) times a day.  QUEtiapine (SEROquel) 25 mg tablet Take 1 Tablet by mouth nightly.  famotidine (PEPCID) 20 mg tablet Take 1 tablet by mouth twice daily    carbidopa-levodopa (SINEMET)  mg per tablet 1.5 tablets every 4 hours during the day, total of 5 times daily    lidocaine (LIDODERM) 5 % Apply patch to the affected area/L posterior rib area for 12 hours a day and remove for 12 hours a day. Dx: R07.81    ondansetron (ZOFRAN ODT) 4 mg disintegrating tablet Take 1 Tablet by mouth every eight (8) hours as needed for Nausea or Vomiting.  busPIRone (BUSPAR) 15 mg tablet Take 1 Tablet by mouth three (3) times daily.  polyethylene glycol (MIRALAX) 17 gram/dose powder Take 17 g by mouth two (2) times a day.  diclofenac (Voltaren) 1 % gel Apply  to affected area four (4) times daily.  glucosamine sulfate 1,000 mg cap Take 500 mg by mouth.     traZODone (DESYREL) 50 mg tablet Take 1 Tablet by mouth nightly.  acetaminophen (Tylenol Extra Strength) 500 mg tablet Take  by mouth every six (6) hours as needed for Pain.  pantoprazole (PROTONIX) 40 mg tablet Take 1 Tab by mouth daily.  cholecalciferol (VITAMIN D3) 1,000 unit tablet Take 2,000 Units by mouth daily.  droxidopa (Northera) 100 mg cap capsule Take 1 Capsule by mouth three (3) times daily. (Patient not taking: Reported on 11/8/2021)    sertraline (ZOLOFT) 100 mg tablet Take 1 Tablet by mouth daily. (Patient not taking: Reported on 11/8/2021)    calcium citrate-vitamin D3 1,000 mg-10 mcg /30 mL liqd Take  by mouth. No current facility-administered medications for this visit. Impression see above.       Written by Bernardo Pink, as dictated by Dave Upton MD.

## 2021-11-08 NOTE — PATIENT INSTRUCTIONS
Please stop your Northera. Please take sodium twice a day and you may take an extra at night if BP is still low.  We will call with results of your echocardiogram.

## 2021-11-08 NOTE — Clinical Note
11/8/2021    Patient: Елена Alston   YOB: 1950   Date of Visit: 11/8/2021     Korey Gayle DO  [address] [facility]  [address] [place] [address]  Via In Basket    Dear Korey Gayle DO,      Thank you for referring Ms. Alissa Amaya to [facility] for evaluation. My notes for this consultation are attached. If you have questions, please do not hesitate to call me. I look forward to following your patient along with you.       Sincerely,    Erik Watts MD

## 2021-11-12 ENCOUNTER — ANCILLARY PROCEDURE (OUTPATIENT)
Dept: CARDIOLOGY CLINIC | Age: 71
End: 2021-11-12
Payer: MEDICARE

## 2021-11-12 VITALS — WEIGHT: 118 LBS | BODY MASS INDEX: 19.66 KG/M2 | HEIGHT: 65 IN

## 2021-11-12 DIAGNOSIS — R06.02 SHORTNESS OF BREATH: ICD-10-CM

## 2021-11-12 DIAGNOSIS — I10 HTN (HYPERTENSION), BENIGN: ICD-10-CM

## 2021-11-12 DIAGNOSIS — I95.1 ORTHOSTATIC HYPOTENSION: ICD-10-CM

## 2021-11-12 PROCEDURE — 93306 TTE W/DOPPLER COMPLETE: CPT | Performed by: SPECIALIST

## 2021-11-16 DIAGNOSIS — M79.604 CHRONIC PAIN OF RIGHT LOWER EXTREMITY: Primary | ICD-10-CM

## 2021-11-16 DIAGNOSIS — G89.29 CHRONIC PAIN OF RIGHT LOWER EXTREMITY: Primary | ICD-10-CM

## 2021-11-16 RX ORDER — ACETAMINOPHEN 500 MG
TABLET ORAL
Qty: 90 TABLET | Refills: 2 | Status: SHIPPED | OUTPATIENT
Start: 2021-11-16 | End: 2022-03-03 | Stop reason: SDUPTHER

## 2021-11-16 NOTE — TELEPHONE ENCOUNTER
New Prescription Request:Acetami/Codein 300-30mg tablet //on her current meds:listing it for Acetaminophen(tylenol Extra Strength)500mg tablet. last-ov-9/14/21

## 2021-11-18 DIAGNOSIS — M79.604 CHRONIC PAIN OF RIGHT LOWER EXTREMITY: Primary | ICD-10-CM

## 2021-11-18 DIAGNOSIS — G89.29 CHRONIC PAIN OF RIGHT LOWER EXTREMITY: Primary | ICD-10-CM

## 2021-11-18 LAB
ECHO AO ROOT DIAM: 3.3 CM
ECHO AV AREA PEAK VELOCITY: 2.22 CM2
ECHO AV AREA VTI: 2.01 CM2
ECHO AV AREA/BSA PEAK VELOCITY: 1.4 CM2/M2
ECHO AV AREA/BSA VTI: 1.3 CM2/M2
ECHO AV MEAN GRADIENT: 2.78 MMHG
ECHO AV PEAK GRADIENT: 4.15 MMHG
ECHO AV PEAK VELOCITY: 101.85 CM/S
ECHO AV VTI: 24.49 CM
ECHO IVC PROX: 1.83 CM
ECHO LA AREA 4C: 16.29 CM2
ECHO LA VOL 2C: 57.17 ML (ref 22–52)
ECHO LA VOL 4C: 37.42 ML (ref 22–52)
ECHO LA VOL BP: 49.14 ML (ref 22–52)
ECHO LA VOL/BSA BIPLANE: 31.1 ML/M2 (ref 16–28)
ECHO LA VOLUME INDEX A2C: 36.18 ML/M2 (ref 16–28)
ECHO LA VOLUME INDEX A4C: 23.68 ML/M2 (ref 16–28)
ECHO LV E' LATERAL VELOCITY: 4.85 CM/S
ECHO LV E' SEPTAL VELOCITY: 5.04 CM/S
ECHO LV EDV A2C: 45.68 ML
ECHO LV EDV A4C: 45.46 ML
ECHO LV EDV BP: 46.81 ML (ref 56–104)
ECHO LV EDV INDEX A4C: 28.8 ML/M2
ECHO LV EDV INDEX BP: 29.6 ML/M2
ECHO LV EDV NDEX A2C: 28.9 ML/M2
ECHO LV EJECTION FRACTION A2C: 53 PERCENT
ECHO LV EJECTION FRACTION A4C: 46 PERCENT
ECHO LV EJECTION FRACTION BIPLANE: 50.1 PERCENT (ref 55–100)
ECHO LV ESV A2C: 21.53 ML
ECHO LV ESV A4C: 24.65 ML
ECHO LV ESV BP: 23.35 ML (ref 19–49)
ECHO LV ESV INDEX A2C: 13.6 ML/M2
ECHO LV ESV INDEX A4C: 15.6 ML/M2
ECHO LV ESV INDEX BP: 14.8 ML/M2
ECHO LVOT DIAM: 2.07 CM
ECHO LVOT PEAK GRADIENT: 1.81 MMHG
ECHO LVOT PEAK VELOCITY: 67.23 CM/S
ECHO LVOT SV: 49.2 ML
ECHO LVOT VTI: 14.61 CM
ECHO MV A VELOCITY: 80.54 CM/S
ECHO MV E DECELERATION TIME (DT): 171.07 MS
ECHO MV E VELOCITY: 66.76 CM/S
ECHO MV E/A RATIO: 0.83
ECHO MV E/E' LATERAL: 13.76
ECHO MV E/E' RATIO (AVERAGED): 13.51
ECHO MV E/E' SEPTAL: 13.25
ECHO PV MAX VELOCITY: 79.77 CM/S
ECHO PV PEAK INSTANTANEOUS GRADIENT SYSTOLIC: 2.55 MMHG
ECHO RA MINOR AXIS: 3.62 CM
ECHO RV INTERNAL DIMENSION: 2.98 CM
ECHO RV TAPSE: 2.1 CM (ref 1.5–2)
ECHO TV REGURGITANT MAX VELOCITY: 270.34 CM/S
ECHO TV REGURGITANT PEAK GRADIENT: 29.23 MMHG
LA VOL DISK BP: 46.37 ML (ref 22–52)

## 2021-11-18 RX ORDER — ACETAMINOPHEN AND CODEINE PHOSPHATE 300; 30 MG/1; MG/1
1 TABLET ORAL
Qty: 30 TABLET | Refills: 0 | Status: SHIPPED | OUTPATIENT
Start: 2021-11-18 | End: 2021-12-18

## 2021-11-24 NOTE — PROGRESS NOTES
Patient wall motion normalities patient was recently seen in office has complex orthostasis.     Has no chest pain but episodes of near syncope  Call pt and see if she thinks she could lay still to tolerate Lexiscan nuclear at time of next OV  Future Appointments  12/14/2021 8:30 AM    Poli Pool PA [facility]                BS AMB  12/29/2021 3:00 PM    DO SANDI Mehta                 BS AMB  1/13/2022  4:00 PM    Vinnie Pineda MD [facility]              BS AMB

## 2021-11-30 ENCOUNTER — TELEPHONE (OUTPATIENT)
Dept: CARDIOLOGY CLINIC | Age: 71
End: 2021-11-30

## 2021-11-30 DIAGNOSIS — R03.0 ELEVATED BP WITHOUT DIAGNOSIS OF HYPERTENSION: ICD-10-CM

## 2021-11-30 DIAGNOSIS — R06.02 SHORTNESS OF BREATH: ICD-10-CM

## 2021-11-30 DIAGNOSIS — R29.6 FREQUENT FALLS: ICD-10-CM

## 2021-11-30 DIAGNOSIS — G20 PARKINSON'S DISEASE (HCC): ICD-10-CM

## 2021-11-30 DIAGNOSIS — I10 HTN (HYPERTENSION), BENIGN: ICD-10-CM

## 2021-11-30 DIAGNOSIS — I95.1 ORTHOSTATIC HYPOTENSION: Primary | ICD-10-CM

## 2021-11-30 NOTE — TELEPHONE ENCOUNTER
Attempted to reach patient by telephone. A message was left for return call. Patient needs Linden Dimas.

## 2021-11-30 NOTE — TELEPHONE ENCOUNTER
Verified patient with two types of identifiers. Patient given instructions for nuclear stress test and thinks she can lay down for 15 and then 10 minutes. Patient verbalized understanding and will call with any other questions.

## 2021-11-30 NOTE — TELEPHONE ENCOUNTER
Patient returning phone call. Informed the patient that per nurse Manriquez, she will need to have a lilia scan done.  The appt is set for 1/13/22 at 1pm and will f/u with Dr. Lars Gonzalez right after at [place] [address].       Phone: [phone]

## 2021-12-15 ENCOUNTER — TELEPHONE (OUTPATIENT)
Dept: INTERNAL MEDICINE CLINIC | Age: 71
End: 2021-12-15

## 2021-12-15 NOTE — TELEPHONE ENCOUNTER
[facility] is requesting permission to extend orders to continue 2x per week for 4 wks for strength, balance, and gait training

## 2021-12-20 RX ORDER — TRAZODONE HYDROCHLORIDE 50 MG/1
50 TABLET ORAL
Qty: 90 TABLET | Refills: 0 | Status: SHIPPED | OUTPATIENT
Start: 2021-12-20 | End: 2022-03-28

## 2021-12-29 ENCOUNTER — OFFICE VISIT (OUTPATIENT)
Dept: INTERNAL MEDICINE CLINIC | Age: 71
End: 2021-12-29
Payer: MEDICARE

## 2021-12-29 VITALS
RESPIRATION RATE: 20 BRPM | WEIGHT: 114 LBS | OXYGEN SATURATION: 98 % | DIASTOLIC BLOOD PRESSURE: 80 MMHG | BODY MASS INDEX: 18.99 KG/M2 | HEIGHT: 65 IN | TEMPERATURE: 98.2 F | SYSTOLIC BLOOD PRESSURE: 138 MMHG | HEART RATE: 78 BPM

## 2021-12-29 DIAGNOSIS — F11.99 OPIOID USE, UNSPECIFIED WITH UNSPECIFIED OPIOID-INDUCED DISORDER (HCC): ICD-10-CM

## 2021-12-29 DIAGNOSIS — F41.9 ANXIETY: ICD-10-CM

## 2021-12-29 DIAGNOSIS — G20 PARKINSON'S SYNDROME (HCC): ICD-10-CM

## 2021-12-29 DIAGNOSIS — E87.1 HYPONATREMIA: ICD-10-CM

## 2021-12-29 DIAGNOSIS — F32.A CHRONIC DEPRESSION: ICD-10-CM

## 2021-12-29 DIAGNOSIS — R11.2 NON-INTRACTABLE VOMITING WITH NAUSEA, UNSPECIFIED VOMITING TYPE: ICD-10-CM

## 2021-12-29 DIAGNOSIS — R26.81 GAIT INSTABILITY: ICD-10-CM

## 2021-12-29 DIAGNOSIS — I95.1 ORTHOSTATIC HYPOTENSION: ICD-10-CM

## 2021-12-29 DIAGNOSIS — M54.6 THORACIC BACK PAIN, UNSPECIFIED BACK PAIN LATERALITY, UNSPECIFIED CHRONICITY: ICD-10-CM

## 2021-12-29 DIAGNOSIS — K21.00 GASTROESOPHAGEAL REFLUX DISEASE WITH ESOPHAGITIS, UNSPECIFIED WHETHER HEMORRHAGE: Primary | ICD-10-CM

## 2021-12-29 PROCEDURE — 99214 OFFICE O/P EST MOD 30 MIN: CPT | Performed by: INTERNAL MEDICINE

## 2021-12-29 PROCEDURE — 1101F PT FALLS ASSESS-DOCD LE1/YR: CPT | Performed by: INTERNAL MEDICINE

## 2021-12-29 PROCEDURE — G8400 PT W/DXA NO RESULTS DOC: HCPCS | Performed by: INTERNAL MEDICINE

## 2021-12-29 PROCEDURE — G9717 DOC PT DX DEP/BP F/U NT REQ: HCPCS | Performed by: INTERNAL MEDICINE

## 2021-12-29 PROCEDURE — G8752 SYS BP LESS 140: HCPCS | Performed by: INTERNAL MEDICINE

## 2021-12-29 PROCEDURE — G8420 CALC BMI NORM PARAMETERS: HCPCS | Performed by: INTERNAL MEDICINE

## 2021-12-29 PROCEDURE — G8536 NO DOC ELDER MAL SCRN: HCPCS | Performed by: INTERNAL MEDICINE

## 2021-12-29 PROCEDURE — G8427 DOCREV CUR MEDS BY ELIG CLIN: HCPCS | Performed by: INTERNAL MEDICINE

## 2021-12-29 PROCEDURE — 1090F PRES/ABSN URINE INCON ASSESS: CPT | Performed by: INTERNAL MEDICINE

## 2021-12-29 PROCEDURE — 3017F COLORECTAL CA SCREEN DOC REV: CPT | Performed by: INTERNAL MEDICINE

## 2021-12-29 PROCEDURE — G8754 DIAS BP LESS 90: HCPCS | Performed by: INTERNAL MEDICINE

## 2021-12-29 PROCEDURE — G0463 HOSPITAL OUTPT CLINIC VISIT: HCPCS | Performed by: INTERNAL MEDICINE

## 2021-12-29 RX ORDER — ACETAMINOPHEN AND CODEINE PHOSPHATE 300; 30 MG/1; MG/1
1 TABLET ORAL
Qty: 60 TABLET | Refills: 0 | Status: SHIPPED | OUTPATIENT
Start: 2021-12-29 | End: 2022-01-28 | Stop reason: SDUPTHER

## 2021-12-29 RX ORDER — ONDANSETRON 4 MG/1
4 TABLET, ORALLY DISINTEGRATING ORAL
Qty: 30 TABLET | Refills: 5 | Status: SHIPPED | OUTPATIENT
Start: 2021-12-29 | End: 2022-05-05 | Stop reason: SDUPTHER

## 2021-12-29 NOTE — LETTER
12/29/2021 4:08 PM    MsAlfonso  [address] [facility] [id number]        Immunization History   Administered Date(s) Administered    COVID-19, PFIZER, MRNA, LNP-S, PF, 30MCG/0.3ML DOSE 03/13/2021, 03/27/2021         Sincerely,      Chad Busch DO

## 2021-12-29 NOTE — PROGRESS NOTES
Health Maintenance Due   Topic Date Due    Hepatitis C Screening  Never done    DTaP/Tdap/Td series (1 - Tdap) Never done    Colorectal Cancer Screening Combo  Never done    Shingrix Vaccine Age 50> (1 of 2) Never done    Pneumococcal 65+ years (1 of 1 - PPSV23) Never done    Breast Cancer Screen Mammogram  04/08/2018    Flu Vaccine (1) Never done    COVID-19 Vaccine (3 - Booster) 09/27/2021       Chief Complaint   Patient presents with    Hypertension    Dizziness    Fatigue    Back Pain       1. Have you been to the ER, urgent care clinic since your last visit? Hospitalized since your last visit? No    2. Have you seen or consulted any other health care providers outside of the [address] [place] [address] since your last visit? Include any pap smears or colon screening. No    3) Do you have an Advance Directive on file? no    4) Are you interested in receiving information on Advance Directives? NO      Patient is accompanied by  I have received verbal consent from Neto Palencia to discuss any/all medical information while they are present in the room.

## 2021-12-31 NOTE — PROGRESS NOTES
HISTORY OF PRESENT ILLNESS  Courtney Frank is a 80 y.o. female. Pt. comes in for f/u. Has a few chronic medical issues as documented including including Parkinson's syndrome with gait instability and HTN with orthostatic hypotension. Vital signs are stable. BMI is 18.9. Has lost some weight. Had recent thoracic kyphoplasty. Continues have chronic back and rib pains. Has been on Tylenol and as needed Tylenol 3. Asking me to give her something stronger. Has chronic orthostatic hypotension with frequent falls and injuries. Depends on  for some ADLs. Has chronic depression anxiety. Followed by neurologist for Parkinson's disease. Remains on Sinemet. She is on disability. Has GERD with chronic nausea. Takes Zofran as needed. Has had Covid-19 vaccination. Reports taking proper precautions. Denies any related signs or symptoms. PMH/PSH/Allergies/Social History/medication list and most recent studies reviewed with patient. Tobacco use: No  Alcohol use: Social    Reports compliance with medications and diet. Trying to be active physically to control weight. Reports no other new c/o. HPI    Review of Systems   Constitutional: Negative. HENT: Negative. Eyes: Negative for blurred vision. Respiratory: Negative for shortness of breath. Cardiovascular: Negative for chest pain and leg swelling. Gastrointestinal: Negative for abdominal pain. Genitourinary: Negative for dysuria and frequency. Musculoskeletal: Positive for back pain, falls and joint pain. Skin: Negative. Neurological: Positive for dizziness, focal weakness (R leg) and weakness. Negative for sensory change and headaches. Psychiatric/Behavioral: Positive for depression and memory loss. Negative for hallucinations, substance abuse and suicidal ideas. The patient is nervous/anxious. The patient does not have insomnia. All other systems reviewed and are negative.       Physical Exam  Vitals and nursing note reviewed. Constitutional:       General: She is not in acute distress (with pain). Appearance: She is well-developed. Comments: Pleasant lady  Using a cane   HENT:      Head: Normocephalic and atraumatic. Mouth/Throat:      Mouth: Mucous membranes are moist.   Eyes:      General: No scleral icterus. Conjunctiva/sclera: Conjunctivae normal.   Neck:      Thyroid: No thyromegaly. Vascular: No carotid bruit or JVD. Cardiovascular:      Rate and Rhythm: Normal rate and regular rhythm. Pulses: Normal pulses. Heart sounds: Normal heart sounds. No murmur heard. Pulmonary:      Effort: Pulmonary effort is normal. No respiratory distress. Breath sounds: Normal breath sounds. No wheezing or rales. Abdominal:      General: Bowel sounds are normal. There is no distension. Palpations: Abdomen is soft. Tenderness: There is no abdominal tenderness. There is no right CVA tenderness or left CVA tenderness. Musculoskeletal:         General: Tenderness (Mid and lower back/bilateral mid ribs, no bruising) present. No deformity or signs of injury. Cervical back: Normal range of motion and neck supple. No rigidity. Right lower leg: No edema. Left lower leg: No edema. Comments: djd  Weak legs with muscle atrophy   Lymphadenopathy:      Cervical: No cervical adenopathy. Skin:     General: Skin is warm. Findings: No rash. Neurological:      Mental Status: She is alert and oriented to person, place, and time. Cranial Nerves: No cranial nerve deficit. Coordination: Coordination abnormal.      Gait: Gait abnormal.   Psychiatric:         Behavior: Behavior normal.      Comments: Seems anxious and depressed         ASSESSMENT and PLAN  Diagnoses and all orders for this visit:    1. Gastroesophageal reflux disease with esophagitis, unspecified whether hemorrhage  Continue Protonix  2.  Non-intractable vomiting with nausea, unspecified vomiting type  -     ondansetron (ZOFRAN ODT) 4 mg disintegrating tablet; Take 1 Tablet by mouth every eight (8) hours as needed for Nausea or Vomiting. 3. Gait instability  Fall precautions discussed    4. Orthostatic hypotension  Monitor BP at home  Not taking any BP meds now  5. Anxiety  Continue BuSpar and Seroquel  6. Chronic depression    7. Parkinson's syndrome ([facility] [address]  Continue Sinemet  Follow-up with neurologist as scheduled  8. Hyponatremia    9. Thoracic back pain, unspecified back pain laterality, unspecified chronicity  -     acetaminophen-codeine (TYLENOL #3) 300-30 mg per tablet; Take 1 Tablet by mouth two (2) times daily as needed for Pain for up to 30 days. Max Daily Amount: 2 Tablets. Potential side effects and risks of narcotics discussed in detail  Fall precautions discussed  10. Opioid use, unspecified with unspecified opioid-induced disorder      Follow-up and Dispositions    · Return in about 4 weeks (around 1/26/2022). Continue with current medical management and plan  lab results and schedule of future lab studies reviewed with patient  reviewed diet, exercise and weight control  reviewed medications and side effects in detail  F/u with other MD's/ providers as scheduled  COVID-19 precautions discussed with pt  An After Visit Summary was printed and given to the patient.

## 2022-01-03 RX ORDER — SERTRALINE HYDROCHLORIDE 100 MG/1
TABLET, FILM COATED ORAL
Qty: 90 TABLET | Refills: 0 | Status: SHIPPED | OUTPATIENT
Start: 2022-01-03 | End: 2022-04-12

## 2022-01-13 LAB — SARS-COV-2, NAA: NEGATIVE

## 2022-01-26 ENCOUNTER — VIRTUAL VISIT (OUTPATIENT)
Dept: INTERNAL MEDICINE CLINIC | Age: 72
End: 2022-01-26
Payer: MEDICARE

## 2022-01-26 DIAGNOSIS — Z98.890 S/P KYPHOPLASTY: ICD-10-CM

## 2022-01-26 DIAGNOSIS — Z00.00 MEDICARE ANNUAL WELLNESS VISIT, SUBSEQUENT: ICD-10-CM

## 2022-01-26 DIAGNOSIS — R07.81 RIB PAIN ON LEFT SIDE: ICD-10-CM

## 2022-01-26 DIAGNOSIS — G20 PARKINSON'S SYNDROME (HCC): ICD-10-CM

## 2022-01-26 DIAGNOSIS — F32.A CHRONIC DEPRESSION: ICD-10-CM

## 2022-01-26 DIAGNOSIS — G31.9 CEREBRAL ATROPHY, MILD (HCC): ICD-10-CM

## 2022-01-26 DIAGNOSIS — I95.1 ORTHOSTATIC HYPOTENSION: Primary | ICD-10-CM

## 2022-01-26 DIAGNOSIS — R11.2 NON-INTRACTABLE VOMITING WITH NAUSEA, UNSPECIFIED VOMITING TYPE: ICD-10-CM

## 2022-01-26 DIAGNOSIS — F11.99 OPIOID USE, UNSPECIFIED WITH UNSPECIFIED OPIOID-INDUCED DISORDER (HCC): ICD-10-CM

## 2022-01-26 PROCEDURE — G8400 PT W/DXA NO RESULTS DOC: HCPCS | Performed by: INTERNAL MEDICINE

## 2022-01-26 PROCEDURE — 1101F PT FALLS ASSESS-DOCD LE1/YR: CPT | Performed by: INTERNAL MEDICINE

## 2022-01-26 PROCEDURE — G8756 NO BP MEASURE DOC: HCPCS | Performed by: INTERNAL MEDICINE

## 2022-01-26 PROCEDURE — G9717 DOC PT DX DEP/BP F/U NT REQ: HCPCS | Performed by: INTERNAL MEDICINE

## 2022-01-26 PROCEDURE — G0463 HOSPITAL OUTPT CLINIC VISIT: HCPCS | Performed by: INTERNAL MEDICINE

## 2022-01-26 PROCEDURE — 1090F PRES/ABSN URINE INCON ASSESS: CPT | Performed by: INTERNAL MEDICINE

## 2022-01-26 PROCEDURE — G8427 DOCREV CUR MEDS BY ELIG CLIN: HCPCS | Performed by: INTERNAL MEDICINE

## 2022-01-26 PROCEDURE — G8420 CALC BMI NORM PARAMETERS: HCPCS | Performed by: INTERNAL MEDICINE

## 2022-01-26 PROCEDURE — 99214 OFFICE O/P EST MOD 30 MIN: CPT | Performed by: INTERNAL MEDICINE

## 2022-01-26 PROCEDURE — G0439 PPPS, SUBSEQ VISIT: HCPCS | Performed by: INTERNAL MEDICINE

## 2022-01-26 PROCEDURE — G8536 NO DOC ELDER MAL SCRN: HCPCS | Performed by: INTERNAL MEDICINE

## 2022-01-26 PROCEDURE — 3017F COLORECTAL CA SCREEN DOC REV: CPT | Performed by: INTERNAL MEDICINE

## 2022-01-26 NOTE — PROGRESS NOTES
Health Maintenance Due   Topic Date Due    Hepatitis C Screening  Never done    DTaP/Tdap/Td series (1 - Tdap) Never done    Colorectal Cancer Screening Combo  Never done    Shingrix Vaccine Age 50> (1 of 2) Never done    Pneumococcal 65+ years (1 of 1 - PPSV23) Never done    Breast Cancer Screen Mammogram  04/08/2018    Medicare Yearly Exam  02/03/2022       No chief complaint on file. 1. Have you been to the ER, urgent care clinic since your last visit? Hospitalized since your last visit? No    2. Have you seen or consulted any other health care providers outside of the [address] [place] [address] since your last visit? Include any pap smears or colon screening. No    3) Do you have an Advance Directive on file? no    4) Are you interested in receiving information on Advance Directives? NO      Patient is accompanied by self I have received verbal consent from Chetna Escobar to discuss any/all medical information while they are present in the room.

## 2022-01-26 NOTE — PROGRESS NOTES
Collin Batres (: 1950) is a 80 y.o. female, established patient, here for evaluation of the following chief complaint(s):   Hypertension, GERD, Other (4 wk f/u), and Back Pain (recent T6 kypho , fell at home)       ASSESSMENT/PLAN:  Below is the assessment and plan developed based on review of pertinent history, labs, studies, and medications. 1. Orthostatic hypotension  Stable chronic condition. Continue current treatment/medications. Monitor BP at home with goal of 140/90 or less  Fall precautions discussed    2. Parkinson's syndrome ([facility] [address]  Stable chronic condition. Continue current treatment/medications. Continue Sinemet  Follow-up with neurologist as scheduled  3. Cerebral atrophy, mild (HCC)  4. Opioid use, unspecified with unspecified opioid-induced disorder ([facility] [address]  On Tylenol 3 twice daily  Potential risk and side effects discussed  5. Chronic depression  Stable chronic condition. Continue current treatment/medications. Continue Zoloft  6. Rib pain on left side  Apply heat/ice to the area as tolerated  Continue Tylenol 3 and regular Tylenol as directed  7. S/P kyphoplasty  This was done recently and she is recovering  8. Non-intractable vomiting with nausea, unspecified vomiting type  Continue Zofran as needed  9. Medicare annual wellness visit, subsequent      Return in about 3 months (around 2022). SUBJECTIVE/OBJECTIVE:  Pt. is seen virtually with her  for f/u. Has a few chronic medical issues as documented including HTN, orthostatic hypotension, gait instability, Parkinson's disease, depression/anxiety. Reports BP doing well at home. Reports having lost some weight. Appetite is poor. Not very active physically. Depends on  for many ADLs. Had recent T6 kyphoplasty. Doing better. Had a fall the same day reports some pain on the left posterior ribs with bruising. Remains on combination of #3 on regular Tylenol. Has had Covid vaccination.   Taking precautions for Covid 19. Denies any related signs or symptoms including fever, cough, SOB or CP. PMH/PSH/Allergies/Social History/medication list and most recent studies reviewed with patient. Tobacco use: No  Alcohol use: No  Reports compliance with medications and diet. Reports no other new c/o.     Plan:  Continue current medications  Watch diet and remain active physically  Follow-up with other MDs/specialists as scheduled  COVID-19 precautions discussed with pt  Follow-up 3 months or as needed      Physical Exam    [INSTRUCTIONS:  \"[x]\" Indicates a positive item  \"[]\" Indicates a negative item  -- DELETE ALL ITEMS NOT EXAMINED]    Constitutional: [x] Appears well-developed and well-nourished [x] No apparent distress      [] Abnormal -     Mental status: [x] Alert and awake  [x] Oriented to person/place/time [x] Able to follow commands    [] Abnormal -     Eyes:   EOM    [x]  Normal    [] Abnormal -   Sclera  [x]  Normal    [] Abnormal -          Discharge [x]  None visible   [] Abnormal -     HENT: [x] Normocephalic, atraumatic  [] Abnormal -   [x] Mouth/Throat: Mucous membranes are moist    External Ears [x] Normal  [] Abnormal -    Neck: [x] No visualized mass [] Abnormal -     Pulmonary/Chest: [x] Respiratory effort normal   [x] No visualized signs of difficulty breathing or respiratory distress        [] Abnormal -      Musculoskeletal:   [x] Normal gait with no signs of ataxia         [x] Normal range of motion of neck        [] Abnormal -     Neurological:        [x] No Facial Asymmetry (Cranial nerve 7 motor function) (limited exam due to video visit)          [x] No gaze palsy        [] Abnormal -          Skin:        [x] No significant exanthematous lesions or discoloration noted on facial skin         [] Abnormal -            Psychiatric:       [x] Normal Affect [] Abnormal -        [x] No Hallucinations    Other pertinent observable physical exam findings:-        On this date 01/26/2022 I have spent 30 minutes reviewing previous notes, test results and face to face (virtual) with the patient discussing the diagnosis and importance of compliance with the treatment plan as well as documenting on the day of the visit. Nathan Reyes was evaluated through a synchronous (real-time) audio-video encounter. The patient (or guardian if applicable) is aware that this is a billable service, which includes applicable co-pays. Verbal consent to proceed has been obtained. The visit was conducted pursuant to the emergency declaration under the [facility], [address] [place] [address] authority and the [employer] Act. Patient identification was verified, and a caregiver was present when appropriate. The patient was located at home in a state where the provider was licensed to provide care. An electronic signature was used to authenticate this note. -- Sari Al,  Schedule of Personalized Health Plan  (Provide Copy to Patient)  The best way to stay healthy is to live a healthy lifestyle. A healthy lifestyle includes regular exercise, eating a well-balanced diet, keeping a healthy weight and not smoking. Regular physical exams and screening tests are another important way to take care of yourself. Preventive exams provided by health care providers can find health problems early when treatment works best and can keep you from getting certain diseases or illnesses. Preventive services include exams, lab tests, screenings, shots, monitoring and information to help you take care of your own health. All people over 65 should have a pneumonia shot. Pneumonia shots are usually only needed once in a lifetime unless your doctor decides differently. All people over 65 should have a yearly flu shot. People over 65 are at medium to high risk for Hepatitis B. Three shots are needed for complete protection.   In addition to your physical exam, some screening tests are recommended:    Bone mass measurement (dexa scan) is recommended every two years  Diabetes Mellitus screening is recommended every year. Glaucoma is an eye disease caused by high pressure in the eye. An eye exam is recommended every year. Cardiovascular screening tests that check your cholesterol and other blood fat (lipid) levels are recommended every five years. Colorectal Cancer screening tests help to find pre-cancerous polyps (growths in the colon) so they can be removed before they turn into cancer. Tests ordered for screening depend on your personal and family history risk factors. Screening for Breast Cancer is recommended yearly with a mammogram.    Screening for Cervical Cancer is recommended every two years (annually for certain risk factors, such as previous history of STD or abnormal PAP in past 7 years), with a Pelvic Exam with PAP    Here is a list of your current Health Maintenance items with a due date:  Health Maintenance   Topic Date Due    Hepatitis C Screening  Never done    DTaP/Tdap/Td series (1 - Tdap) Never done    Colorectal Cancer Screening Combo  Never done    Shingrix Vaccine Age 50> (1 of 2) Never done    Pneumococcal 65+ years (1 of 1 - PPSV23) Never done    Breast Cancer Screen Mammogram  04/08/2018    Flu Vaccine (1) 06/30/2022 (Originally 9/1/2021)    Medicare Yearly Exam  01/27/2023    Lipid Screen  06/15/2026    COVID-19 Vaccine  Completed    Bone Densitometry (Dexa) Screening  Addressed       This is the Subsequent Medicare Annual Wellness Exam, performed 12 months or more after the Initial AWV or the last Subsequent AWV    I have reviewed the patient's medical history in detail and updated the computerized patient record. Assessment/Plan   Education and counseling provided:  Are appropriate based on today's review and evaluation    1. Orthostatic hypotension  2.  Parkinson's syndrome ([address]  3. Cerebral atrophy, mild (HCC)  4. Opioid use, unspecified with unspecified opioid-induced disorder ([facility] [address]  5. Chronic depression  6. Rib pain on left side  7. S/P kyphoplasty  8. Non-intractable vomiting with nausea, unspecified vomiting type  9. Medicare annual wellness visit, subsequent       Depression Risk Factor Screening     3 most recent PHQ Screens 12/29/2021   PHQ Not Done -   Little interest or pleasure in doing things Not at all   Feeling down, depressed, irritable, or hopeless Not at all   Total Score PHQ 2 0   Trouble falling or staying asleep, or sleeping too much -   Feeling tired or having little energy -   Poor appetite, weight loss, or overeating -   Feeling bad about yourself - or that you are a failure or have let yourself or your family down -   Trouble concentrating on things such as school, work, reading, or watching TV -   Moving or speaking so slowly that other people could have noticed; or the opposite being so fidgety that others notice -   Thoughts of being better off dead, or hurting yourself in some way -   How difficult have these problems made it for you to do your work, take care of your home and get along with others -       Alcohol & Drug Abuse Risk Screen    Do you average more than 1 drink per night or more than 7 drinks a week:  No    On any one occasion in the past three months have you have had more than 3 drinks containing alcohol:  No          Functional Ability and Level of Safety    Hearing: Hearing is good. Activities of Daily Living: The home contains: handrails and grab bars  Patient needs help with:  transportation, shopping, preparing meals, laundry, housework, managing medications and managing money      Ambulation: with mild difficulty     Fall Risk:  Fall Risk Assessment, last 12 mths 1/26/2022   Able to walk? Yes   Fall in past 12 months? 1   Do you feel unsteady? 1   Are you worried about falling 1   Is TUG test greater than 12 seconds?  -   Is the gait abnormal? - Number of falls in past 12 months 1   Fall with injury? 0      Abuse Screen:  Patient is not abused       Cognitive Screening    Has your family/caregiver stated any concerns about your memory: no     Cognitive Screening: A+O x 3    Health Maintenance Due     Health Maintenance Due   Topic Date Due    Hepatitis C Screening  Never done    DTaP/Tdap/Td series (1 - Tdap) Never done    Colorectal Cancer Screening Combo  Never done    Shingrix Vaccine Age 50> (1 of 2) Never done    Pneumococcal 65+ years (1 of 1 - PPSV23) Never done    Breast Cancer Screen Mammogram  04/08/2018       Patient Care Team   Patient Care Team:  Carol Drummond DO as PCP - General  Carol Drummond DO as PCP [facility]  Denson Mohs, MD (Orthopedic Surgery)  Christine Galloway MD (Orthopedic Surgery)  Miriam Ivory DO (Neurology)  Mike Mckeon PsyD (Psychology)  Sonia Swartz MD (Cardiology)    History     Patient Active Problem List   Diagnosis Code    Essential hypertension I10    Hypokalemia E87.6    CAD (coronary artery disease) I25.10    Hypercholesteremia E78.00    Vitamin D deficiency E55.9    Onychomycosis of toenail B35.1    Chronic leg pain M79.606, G89.29    Right LBP M54.50    Sacroiliac joint dysfunction of right side M53.3    Right buttock pain M79.18    Right leg pain M79.604    Trochanteric bursitis M70.60    Gluteal tendinitis M76.00    Gait instability R26.81    Cerebral atrophy, mild (HCC) G31.9    Weakness generalized R53.1    Sleep disorder, unspecified G47.9    Fidgeting R45.89    Neurological disorder G98.8    Frequent falls R29.6    Medicare annual wellness visit, subsequent Z00.00    Parkinson's syndrome ([facility] [address] G20    Chronic depression F32. A    Anxiety F41.9    Iron deficiency anemia, unspecified iron deficiency anemia type D50.9    Gastroesophageal reflux disease without esophagitis K21.9    S/P kyphoplasty Z98.890    Hyponatremia E87.1  Orthostatic hypotension I95.1    Chronic insomnia F51.04    Closed fracture of sacrum, unspecified portion of sacrum, sequela S32.10XS    Opioid use, unspecified with unspecified opioid-induced disorder F11.99    Non-intractable vomiting with nausea, unspecified vomiting type R11.2     Past Medical History:   Diagnosis Date    CAD (coronary artery disease)     Hypertension     Hypokalaemia     Sacrum and coccyx fracture ([address] 06/05/2021      Past Surgical History:   Procedure Laterality Date    HX APPENDECTOMY      HX OOPHORECTOMY Right     HX SHOULDER REPLACEMENT      HX TONSILLECTOMY      IR DRAIN HEMATOMA SEROMA FLUID       Current Outpatient Medications   Medication Sig Dispense Refill    sertraline (ZOLOFT) 100 mg tablet Take 1 tablet by mouth once daily 90 Tablet 0    ondansetron (ZOFRAN ODT) 4 mg disintegrating tablet Take 1 Tablet by mouth every eight (8) hours as needed for Nausea or Vomiting. 30 Tablet 5    acetaminophen-codeine (TYLENOL #3) 300-30 mg per tablet Take 1 Tablet by mouth two (2) times daily as needed for Pain for up to 30 days. Max Daily Amount: 2 Tablets. 60 Tablet 0    traZODone (DESYREL) 50 mg tablet Take 1 tablet by mouth nightly 90 Tablet 0    acetaminophen (Tylenol Extra Strength) 500 mg tablet Take  by mouth every six (6) hours as needed for Pain. 90 Tablet 2    potassium chloride (K-DUR, KLOR-CON) 20 mEq tablet Take 1 tablet by mouth twice daily 180 Tablet 1    sodium chloride 1 gram tablet Take 1 Tablet by mouth two (2) times a day. 60 Tablet 2    QUEtiapine (SEROquel) 25 mg tablet Take 1 Tablet by mouth nightly. 90 Tablet 1    carbidopa-levodopa (SINEMET)  mg per tablet 1.5 tablets every 4 hours during the day, total of 5 times daily 675 Tablet 1    lidocaine (LIDODERM) 5 % Apply patch to the affected area/L posterior rib area for 12 hours a day and remove for 12 hours a day.  Dx: R07.81 10 Each 5    busPIRone (BUSPAR) 15 mg tablet Take 1 Tablet by mouth three (3) times daily. 90 Tablet 5    polyethylene glycol (MIRALAX) 17 gram/dose powder Take 17 g by mouth two (2) times a day. 595 g 1    diclofenac (Voltaren) 1 % gel Apply  to affected area four (4) times daily.  glucosamine sulfate 1,000 mg cap Take 500 mg by mouth.  pantoprazole (PROTONIX) 40 mg tablet Take 1 Tab by mouth daily. 90 Tab 1    cholecalciferol (VITAMIN D3) 1,000 unit tablet Take 2,000 Units by mouth daily.        Allergies   Allergen Reactions    Pcn [Penicillins] Swelling       Family History   Problem Relation Age of Onset    Diabetes Mother      Social History     Tobacco Use    Smoking status: Former Smoker     Packs/day: 0.50     Quit date: 10/19/2011     Years since quitting: 10.2    Smokeless tobacco: Never Used   Substance Use Topics    Alcohol use: Not Currently     Alcohol/week: 1.0 - 2.0 standard drink     Types: 1 - 2 Standard drinks or equivalent per week     Comment: one drink per week         Jasmeet Toscano DO

## 2022-01-28 DIAGNOSIS — M54.6 THORACIC BACK PAIN, UNSPECIFIED BACK PAIN LATERALITY, UNSPECIFIED CHRONICITY: ICD-10-CM

## 2022-01-28 DIAGNOSIS — G20 PARKINSON DISEASE (HCC): ICD-10-CM

## 2022-01-28 DIAGNOSIS — I25.10 CORONARY ARTERY DISEASE INVOLVING NATIVE CORONARY ARTERY OF NATIVE HEART WITHOUT ANGINA PECTORIS: Primary | ICD-10-CM

## 2022-01-28 DIAGNOSIS — G31.9 CEREBRAL ATROPHY, MILD (HCC): ICD-10-CM

## 2022-01-28 RX ORDER — LIDOCAINE 50 MG/G
PATCH TOPICAL
Qty: 10 EACH | Refills: 5 | Status: SHIPPED | OUTPATIENT
Start: 2022-01-28 | End: 2022-08-10 | Stop reason: SDUPTHER

## 2022-01-28 RX ORDER — POTASSIUM CHLORIDE 20 MEQ/1
20 TABLET, EXTENDED RELEASE ORAL 2 TIMES DAILY
Qty: 180 TABLET | Refills: 1 | Status: SHIPPED | OUTPATIENT
Start: 2022-01-28 | End: 2022-02-05 | Stop reason: SDUPTHER

## 2022-01-28 RX ORDER — ACETAMINOPHEN AND CODEINE PHOSPHATE 300; 30 MG/1; MG/1
1 TABLET ORAL
Qty: 60 TABLET | Refills: 0 | Status: SHIPPED | OUTPATIENT
Start: 2022-01-28 | End: 2022-03-03

## 2022-01-28 RX ORDER — BUSPIRONE HYDROCHLORIDE 15 MG/1
15 TABLET ORAL 3 TIMES DAILY
Qty: 90 TABLET | Refills: 5 | Status: SHIPPED | OUTPATIENT
Start: 2022-01-28 | End: 2022-06-22 | Stop reason: SDUPTHER

## 2022-01-31 ENCOUNTER — DOCUMENTATION ONLY (OUTPATIENT)
Dept: INTERNAL MEDICINE CLINIC | Age: 72
End: 2022-01-31

## 2022-01-31 DIAGNOSIS — G20 PARKINSON DISEASE (HCC): ICD-10-CM

## 2022-01-31 RX ORDER — CARBIDOPA AND LEVODOPA 25; 100 MG/1; MG/1
TABLET ORAL
Qty: 675 TABLET | Refills: 1 | Status: CANCELLED | OUTPATIENT
Start: 2022-01-31

## 2022-01-31 NOTE — PROGRESS NOTES
Chief Complaint   Patient presents with    Prior Auth     Lidocaine 5% patches     Denied today  Your request has been denied

## 2022-01-31 NOTE — PROGRESS NOTES
Chief Complaint   Patient presents with    Prior Auth     Lidocaine 5% patches      Sent to plan today

## 2022-02-01 RX ORDER — CARBIDOPA AND LEVODOPA 25; 100 MG/1; MG/1
TABLET ORAL
Qty: 675 TABLET | Refills: 1 | Status: SHIPPED | OUTPATIENT
Start: 2022-02-01 | End: 2022-06-02 | Stop reason: SDUPTHER

## 2022-02-01 NOTE — TELEPHONE ENCOUNTER
Future Appointments   Date Time Provider Sarah Shakila   2022  8:00 AM ALE CONTRERAS AMB   2022  9:40 AM Vinnie Pineda MD [facility] AMB    follow up made at next available                     Last Appointment My Department:  2021    Please review and send in refill below if warranted.     Requested Prescriptions     Pending Prescriptions Disp Refills    carbidopa-levodopa (SINEMET)  mg per tablet 675 Tablet 1     Si.5 tablets every 4 hours during the day, total of 5 times daily
DISPLAY PLAN FREE TEXT

## 2022-02-05 DIAGNOSIS — K21.00 GASTROESOPHAGEAL REFLUX DISEASE WITH ESOPHAGITIS, UNSPECIFIED WHETHER HEMORRHAGE: Primary | ICD-10-CM

## 2022-02-05 DIAGNOSIS — I25.10 CORONARY ARTERY DISEASE INVOLVING NATIVE CORONARY ARTERY OF NATIVE HEART WITHOUT ANGINA PECTORIS: ICD-10-CM

## 2022-02-07 RX ORDER — PANTOPRAZOLE SODIUM 40 MG/1
40 TABLET, DELAYED RELEASE ORAL DAILY
Qty: 90 TABLET | Refills: 1 | Status: SHIPPED | OUTPATIENT
Start: 2022-02-07 | End: 2022-05-05 | Stop reason: SDUPTHER

## 2022-02-07 RX ORDER — POTASSIUM CHLORIDE 20 MEQ/1
20 TABLET, EXTENDED RELEASE ORAL 2 TIMES DAILY
Qty: 180 TABLET | Refills: 1 | Status: SHIPPED | OUTPATIENT
Start: 2022-02-07 | End: 2022-05-05 | Stop reason: SDUPTHER

## 2022-02-21 ENCOUNTER — ANCILLARY PROCEDURE (OUTPATIENT)
Dept: CARDIOLOGY CLINIC | Age: 72
End: 2022-02-21
Payer: MEDICARE

## 2022-02-21 VITALS
WEIGHT: 114 LBS | HEIGHT: 65 IN | SYSTOLIC BLOOD PRESSURE: 130 MMHG | DIASTOLIC BLOOD PRESSURE: 80 MMHG | BODY MASS INDEX: 18.99 KG/M2

## 2022-02-21 DIAGNOSIS — I10 ESSENTIAL HYPERTENSION: ICD-10-CM

## 2022-02-21 DIAGNOSIS — R06.02 SHORTNESS OF BREATH: ICD-10-CM

## 2022-02-21 DIAGNOSIS — R29.6 FREQUENT FALLS: ICD-10-CM

## 2022-02-21 DIAGNOSIS — I95.1 ORTHOSTATIC HYPOTENSION: ICD-10-CM

## 2022-02-21 DIAGNOSIS — R03.0 ELEVATED BP WITHOUT DIAGNOSIS OF HYPERTENSION: ICD-10-CM

## 2022-02-21 DIAGNOSIS — I10 HTN (HYPERTENSION), BENIGN: ICD-10-CM

## 2022-02-21 DIAGNOSIS — R53.1 WEAKNESS GENERALIZED: ICD-10-CM

## 2022-02-21 DIAGNOSIS — E78.00 HYPERCHOLESTEREMIA: ICD-10-CM

## 2022-02-21 DIAGNOSIS — I25.10 CORONARY ARTERY DISEASE INVOLVING NATIVE CORONARY ARTERY OF NATIVE HEART WITHOUT ANGINA PECTORIS: ICD-10-CM

## 2022-02-21 DIAGNOSIS — G20 PARKINSON'S DISEASE (HCC): ICD-10-CM

## 2022-02-21 PROCEDURE — A9500 TC99M SESTAMIBI: HCPCS | Performed by: SPECIALIST

## 2022-02-21 PROCEDURE — 93017 CV STRESS TEST TRACING ONLY: CPT | Performed by: SPECIALIST

## 2022-02-21 RX ORDER — TETRAKIS(2-METHOXYISOBUTYLISOCYANIDE)COPPER(I) TETRAFLUOROBORATE 1 MG/ML
10 INJECTION, POWDER, LYOPHILIZED, FOR SOLUTION INTRAVENOUS ONCE
Status: COMPLETED | OUTPATIENT
Start: 2022-02-21 | End: 2022-02-21

## 2022-02-21 RX ORDER — TETRAKIS(2-METHOXYISOBUTYLISOCYANIDE)COPPER(I) TETRAFLUOROBORATE 1 MG/ML
30 INJECTION, POWDER, LYOPHILIZED, FOR SOLUTION INTRAVENOUS ONCE
Status: COMPLETED | OUTPATIENT
Start: 2022-02-21 | End: 2022-02-21

## 2022-02-21 RX ORDER — AMINOPHYLLINE 25 MG/ML
100 INJECTION, SOLUTION INTRAVENOUS ONCE
Status: COMPLETED | OUTPATIENT
Start: 2022-02-21 | End: 2022-02-21

## 2022-02-21 RX ADMIN — TECHNETIUM TC 99M SESTAMIBI 24.8 MILLICURIE: 1 INJECTION INTRAVENOUS at 12:10

## 2022-02-21 RX ADMIN — TETRAKIS(2-METHOXYISOBUTYLISOCYANIDE)COPPER(I) TETRAFLUOROBORATE 8.2 MILLICURIE: 1 INJECTION, POWDER, LYOPHILIZED, FOR SOLUTION INTRAVENOUS at 10:50

## 2022-02-21 RX ADMIN — AMINOPHYLLINE 100 MG: 25 INJECTION, SOLUTION INTRAVENOUS at 12:10

## 2022-02-21 RX ADMIN — REGADENOSON 0.4 MG: 0.08 INJECTION, SOLUTION INTRAVENOUS at 12:10

## 2022-02-25 PROBLEM — Z00.00 MEDICARE ANNUAL WELLNESS VISIT, SUBSEQUENT: Status: RESOLVED | Noted: 2017-05-02 | Resolved: 2022-02-25

## 2022-02-26 LAB
NUC STRESS EJECTION FRACTION: 60 %
STRESS BASELINE DIAS BP: 80 MMHG
STRESS BASELINE HR: 82 BPM
STRESS BASELINE ST DEPRESSION: 0 MM
STRESS BASELINE SYS BP: 130 MMHG
STRESS O2 SAT PEAK: 98 %
STRESS O2 SAT REST: 99 %
STRESS PEAK DIAS BP: 60 MMHG
STRESS PEAK SYS BP: 100 MMHG
STRESS PERCENT HR ACHIEVED: 82 %
STRESS POST PEAK HR: 123 BPM
STRESS RATE PRESSURE PRODUCT: NORMAL BPM*MMHG
STRESS ST DEPRESSION: 0 MM
STRESS TARGET HR: 150 BPM
TID: 1.02

## 2022-02-26 PROCEDURE — 93016 CV STRESS TEST SUPVJ ONLY: CPT | Performed by: SPECIALIST

## 2022-02-26 PROCEDURE — 93018 CV STRESS TEST I&R ONLY: CPT | Performed by: SPECIALIST

## 2022-02-26 PROCEDURE — 78452 HT MUSCLE IMAGE SPECT MULT: CPT | Performed by: SPECIALIST

## 2022-03-01 DIAGNOSIS — M54.6 THORACIC BACK PAIN, UNSPECIFIED BACK PAIN LATERALITY, UNSPECIFIED CHRONICITY: ICD-10-CM

## 2022-03-02 ENCOUNTER — PATIENT MESSAGE (OUTPATIENT)
Dept: INTERNAL MEDICINE CLINIC | Age: 72
End: 2022-03-02

## 2022-03-02 DIAGNOSIS — G89.29 CHRONIC PAIN OF RIGHT LOWER EXTREMITY: ICD-10-CM

## 2022-03-02 DIAGNOSIS — M79.604 CHRONIC PAIN OF RIGHT LOWER EXTREMITY: ICD-10-CM

## 2022-03-03 RX ORDER — ACETAMINOPHEN AND CODEINE PHOSPHATE 300; 30 MG/1; MG/1
TABLET ORAL
Qty: 60 TABLET | Refills: 0 | Status: SHIPPED | OUTPATIENT
Start: 2022-03-03 | End: 2022-04-27

## 2022-03-03 RX ORDER — ACETAMINOPHEN 500 MG
TABLET ORAL
Qty: 90 TABLET | Refills: 2 | Status: SHIPPED | OUTPATIENT
Start: 2022-03-03

## 2022-03-18 PROBLEM — F11.99 OPIOID USE, UNSPECIFIED WITH UNSPECIFIED OPIOID-INDUCED DISORDER (HCC): Status: ACTIVE | Noted: 2021-12-29

## 2022-03-18 PROBLEM — R11.2 NON-INTRACTABLE VOMITING WITH NAUSEA, UNSPECIFIED VOMITING TYPE: Status: ACTIVE | Noted: 2022-01-26

## 2022-03-18 PROBLEM — G20 PARKINSON'S SYNDROME (HCC): Status: ACTIVE | Noted: 2021-02-02

## 2022-03-18 PROBLEM — G20.A1 PARKINSON'S SYNDROME: Status: ACTIVE | Noted: 2021-02-02

## 2022-03-18 PROBLEM — R29.6 FREQUENT FALLS: Status: ACTIVE | Noted: 2017-04-04

## 2022-03-19 PROBLEM — F51.04 CHRONIC INSOMNIA: Status: ACTIVE | Noted: 2021-05-24

## 2022-03-19 PROBLEM — K21.9 GASTROESOPHAGEAL REFLUX DISEASE WITHOUT ESOPHAGITIS: Status: ACTIVE | Noted: 2021-02-02

## 2022-03-19 PROBLEM — I95.1 ORTHOSTATIC HYPOTENSION: Status: ACTIVE | Noted: 2021-05-10

## 2022-03-19 PROBLEM — Z98.890 S/P KYPHOPLASTY: Status: ACTIVE | Noted: 2021-02-02

## 2022-03-19 PROBLEM — G47.9 SLEEP DISORDER, UNSPECIFIED: Status: ACTIVE | Noted: 2017-01-09

## 2022-03-19 PROBLEM — F41.9 ANXIETY: Status: ACTIVE | Noted: 2021-02-02

## 2022-03-19 PROBLEM — R45.89 FIDGETING: Status: ACTIVE | Noted: 2017-01-09

## 2022-03-19 PROBLEM — G98.8 NEUROLOGICAL DISORDER: Status: ACTIVE | Noted: 2017-02-03

## 2022-03-19 PROBLEM — S32.10XS CLOSED FRACTURE OF SACRUM, UNSPECIFIED PORTION OF SACRUM, SEQUELA: Status: ACTIVE | Noted: 2021-06-15

## 2022-03-19 PROBLEM — D50.9 IRON DEFICIENCY ANEMIA, UNSPECIFIED IRON DEFICIENCY ANEMIA TYPE: Status: ACTIVE | Noted: 2021-02-02

## 2022-03-20 PROBLEM — F32.A CHRONIC DEPRESSION: Status: ACTIVE | Noted: 2021-02-02

## 2022-03-20 PROBLEM — E87.1 HYPONATREMIA: Status: ACTIVE | Noted: 2021-02-26

## 2022-03-22 ENCOUNTER — OFFICE VISIT (OUTPATIENT)
Dept: INTERNAL MEDICINE CLINIC | Age: 72
End: 2022-03-22
Payer: MEDICARE

## 2022-03-22 VITALS
RESPIRATION RATE: 16 BRPM | WEIGHT: 118 LBS | OXYGEN SATURATION: 98 % | TEMPERATURE: 97.9 F | SYSTOLIC BLOOD PRESSURE: 118 MMHG | HEART RATE: 78 BPM | DIASTOLIC BLOOD PRESSURE: 78 MMHG | BODY MASS INDEX: 19.66 KG/M2 | HEIGHT: 65 IN

## 2022-03-22 DIAGNOSIS — G20 PARKINSON'S SYNDROME (HCC): ICD-10-CM

## 2022-03-22 DIAGNOSIS — I10 ESSENTIAL HYPERTENSION: Primary | ICD-10-CM

## 2022-03-22 DIAGNOSIS — F32.A CHRONIC DEPRESSION: ICD-10-CM

## 2022-03-22 DIAGNOSIS — R26.81 GAIT INSTABILITY: ICD-10-CM

## 2022-03-22 DIAGNOSIS — F41.9 ANXIETY: ICD-10-CM

## 2022-03-22 PROCEDURE — 1090F PRES/ABSN URINE INCON ASSESS: CPT | Performed by: INTERNAL MEDICINE

## 2022-03-22 PROCEDURE — G0463 HOSPITAL OUTPT CLINIC VISIT: HCPCS | Performed by: INTERNAL MEDICINE

## 2022-03-22 PROCEDURE — G8400 PT W/DXA NO RESULTS DOC: HCPCS | Performed by: INTERNAL MEDICINE

## 2022-03-22 PROCEDURE — 99214 OFFICE O/P EST MOD 30 MIN: CPT | Performed by: INTERNAL MEDICINE

## 2022-03-22 PROCEDURE — G8427 DOCREV CUR MEDS BY ELIG CLIN: HCPCS | Performed by: INTERNAL MEDICINE

## 2022-03-22 PROCEDURE — G9717 DOC PT DX DEP/BP F/U NT REQ: HCPCS | Performed by: INTERNAL MEDICINE

## 2022-03-22 PROCEDURE — 1101F PT FALLS ASSESS-DOCD LE1/YR: CPT | Performed by: INTERNAL MEDICINE

## 2022-03-22 PROCEDURE — G8752 SYS BP LESS 140: HCPCS | Performed by: INTERNAL MEDICINE

## 2022-03-22 PROCEDURE — G8754 DIAS BP LESS 90: HCPCS | Performed by: INTERNAL MEDICINE

## 2022-03-22 PROCEDURE — G8536 NO DOC ELDER MAL SCRN: HCPCS | Performed by: INTERNAL MEDICINE

## 2022-03-22 PROCEDURE — 3017F COLORECTAL CA SCREEN DOC REV: CPT | Performed by: INTERNAL MEDICINE

## 2022-03-22 PROCEDURE — G8420 CALC BMI NORM PARAMETERS: HCPCS | Performed by: INTERNAL MEDICINE

## 2022-03-22 RX ORDER — DICLOFENAC SODIUM 10 MG/G
GEL TOPICAL 4 TIMES DAILY
Qty: 100 G | Refills: 5 | Status: SHIPPED | OUTPATIENT
Start: 2022-03-22 | End: 2022-08-10 | Stop reason: SDUPTHER

## 2022-03-22 NOTE — PROGRESS NOTES
ADVISED PATIENT OF THE FOLLOWING HEALTH MAINTAINCE DUE  Health Maintenance Due   Topic Date Due    Hepatitis C Screening  Never done    DTaP/Tdap/Td series (1 - Tdap) Never done    Colorectal Cancer Screening Combo  Never done    Shingrix Vaccine Age 50> (1 of 2) Never done    Pneumococcal 65+ years (1 of 1 - PPSV23) Never done    Breast Cancer Screen Mammogram  04/08/2018      Chief Complaint   Patient presents with    Leg Pain    Hypertension    Coronary Artery Disease    Tremors       1. Have you been to the ER, urgent care clinic since your last visit? Hospitalized since your last visit? No    2. Have you seen or consulted any other health care providers outside of the [address] [place] [address] since your last visit? Include any DEXA scan, mammography  or colon screening. No    3. Do you have an Advance Directive on file? Yes     4. Do you have a DNR on file? no    Patient is accompanied by self and spouse I have received verbal consent from Augusto Rivera to discuss any/all medical information while they are present in the room.       Advance Care Planning 7/11/2017   Patient's Healthcare Decision Maker is: Legal Next of Kin   Confirm Advance Directive Yes, on file         [address] [facility],[address], [address] [place] [address]  Phone: [phone] Fax: [phone]

## 2022-03-22 NOTE — PROGRESS NOTES
HISTORY OF PRESENT ILLNESS  Justin Ascencio is a 71 y.o. female. Pt. comes in with her  for f/u. Has a few chronic medical issues as documented including HTN, Parkinson's disease, depression, anxiety, gait instability. Vital signs are stable. BMI 19.64. Has gained a few pounds. Reports occasional dizziness with standing. Overall has been feeling better. Still having weakness and pain in the legs. Aspercreme helps. Gait is unsteady but no recent falls. Depends on  for some ADLs. Her chronic anxiety, depression and insomnia seems stable on current treatment regimen. Reports worrying about everything especially when watching the news. Followed by neurologist.  Amado Schmidt on Sinemet 5 times daily. Symptoms are improved and stable. All other chronic medical issues are stable on current treatment regimen. Has had Covid-19 vaccination. Reports taking proper precautions. Denies any related signs or symptoms. PMH/PSH/Allergies/Social History/medication list and most recent studies reviewed with patient. Tobacco use: No  Alcohol use: No    Reports compliance with medications and diet. Trying to be active physically as tolerated. Reports no other new c/o. HPI    Review of Systems   Constitutional: Negative. HENT: Negative. Eyes: Negative for blurred vision. Respiratory: Negative for shortness of breath. Cardiovascular: Negative for chest pain and leg swelling. Gastrointestinal: Negative for abdominal pain. Genitourinary: Negative for dysuria and frequency. Musculoskeletal: Positive for back pain and joint pain. Negative for falls. Skin: Negative. Neurological: Positive for dizziness, focal weakness (R leg) and weakness. Negative for sensory change and headaches. Psychiatric/Behavioral: Positive for depression and memory loss. Negative for hallucinations, substance abuse and suicidal ideas. The patient is nervous/anxious. The patient does not have insomnia. All other systems reviewed and are negative. Physical Exam  Vitals and nursing note reviewed. Constitutional:       General: She is not in acute distress (with pain). Appearance: She is well-developed. Comments: Pleasant lady  Using a cane   HENT:      Head: Normocephalic and atraumatic. Mouth/Throat:      Mouth: Mucous membranes are moist.   Eyes:      General: No scleral icterus. Conjunctiva/sclera: Conjunctivae normal.   Neck:      Thyroid: No thyromegaly. Vascular: No carotid bruit or JVD. Cardiovascular:      Rate and Rhythm: Normal rate and regular rhythm. Pulses: Normal pulses. Heart sounds: Normal heart sounds. No murmur heard. Pulmonary:      Effort: Pulmonary effort is normal. No respiratory distress. Breath sounds: Normal breath sounds. No wheezing or rales. Abdominal:      General: Bowel sounds are normal. There is no distension. Palpations: Abdomen is soft. Tenderness: There is no abdominal tenderness. There is no right CVA tenderness or left CVA tenderness. Musculoskeletal:         General: Tenderness (Mid and lower back, chronic) present. No deformity or signs of injury. Cervical back: Normal range of motion and neck supple. No rigidity. Right lower leg: No edema. Left lower leg: No edema. Comments: djd  Weak legs with muscle atrophy   Lymphadenopathy:      Cervical: No cervical adenopathy. Skin:     General: Skin is warm. Findings: No rash. Neurological:      Mental Status: She is alert and oriented to person, place, and time. Mental status is at baseline. Cranial Nerves: No cranial nerve deficit. Coordination: Coordination abnormal.      Gait: Gait abnormal.   Psychiatric:         Behavior: Behavior normal.      Comments: Chronically anxious and depressed         ASSESSMENT and PLAN  Diagnoses and all orders for this visit:    1.  Essential hypertension  Monitor BP at home with goal of 140/90 or less   Stable chronic condition. Continue current treatment/medications. 2. Parkinson's syndrome ([address]  Stable chronic condition. Continue current treatment/medications. 3. Chronic depression  Stable chronic condition. Continue current treatment/medications. 4. Gait instability  Fall precautions discussed    5. Anxiety  Stable chronic condition. Continue current treatment/medications. Advised patient to stop watching the news. Advised patient to stop worrying about anything she has no control over. Advised patient to meditate/pray and do the stuff that she enjoys. Other orders  -     diclofenac (Voltaren) 1 % gel; Apply  to affected area four (4) times daily. Follow-up and Dispositions    · Return in about 4 months (around 7/22/2022). All chronic medical problems are stable  Continue with current medical management and plan  lab results and schedule of future lab studies reviewed with patient  reviewed diet, exercise and weight control  reviewed medications and side effects in detail  F/u with other MD's/ providers as scheduled  COVID-19 precautions discussed with pt  An After Visit Summary was printed and given to the patient.

## 2022-03-23 ENCOUNTER — DOCUMENTATION ONLY (OUTPATIENT)
Dept: INTERNAL MEDICINE CLINIC | Age: 72
End: 2022-03-23

## 2022-03-23 NOTE — PROGRESS NOTES
Chief Complaint   Patient presents with    Prior Auth     Diclofenac Sodium 1% gel      Approvedtoday  Your request has been approved

## 2022-03-28 RX ORDER — TRAZODONE HYDROCHLORIDE 50 MG/1
50 TABLET ORAL
Qty: 90 TABLET | Refills: 0 | Status: SHIPPED | OUTPATIENT
Start: 2022-03-28 | End: 2022-04-04

## 2022-04-04 RX ORDER — TRAZODONE HYDROCHLORIDE 50 MG/1
50 TABLET ORAL
Qty: 90 TABLET | Refills: 0 | Status: SHIPPED | OUTPATIENT
Start: 2022-04-04 | End: 2022-06-22 | Stop reason: SDUPTHER

## 2022-04-27 DIAGNOSIS — M54.6 THORACIC BACK PAIN, UNSPECIFIED BACK PAIN LATERALITY, UNSPECIFIED CHRONICITY: ICD-10-CM

## 2022-04-27 RX ORDER — ACETAMINOPHEN AND CODEINE PHOSPHATE 300; 30 MG/1; MG/1
TABLET ORAL
Qty: 60 TABLET | Refills: 0 | Status: SHIPPED | OUTPATIENT
Start: 2022-04-27 | End: 2022-07-26

## 2022-05-05 DIAGNOSIS — I25.10 CORONARY ARTERY DISEASE INVOLVING NATIVE CORONARY ARTERY OF NATIVE HEART WITHOUT ANGINA PECTORIS: ICD-10-CM

## 2022-05-05 DIAGNOSIS — R11.2 NON-INTRACTABLE VOMITING WITH NAUSEA: ICD-10-CM

## 2022-05-05 DIAGNOSIS — E86.1 FLUID DEFICIT: ICD-10-CM

## 2022-05-05 DIAGNOSIS — K21.00 GASTROESOPHAGEAL REFLUX DISEASE WITH ESOPHAGITIS, UNSPECIFIED WHETHER HEMORRHAGE: ICD-10-CM

## 2022-05-05 RX ORDER — ONDANSETRON 4 MG/1
4 TABLET, ORALLY DISINTEGRATING ORAL
Qty: 30 TABLET | Refills: 5 | Status: SHIPPED | OUTPATIENT
Start: 2022-05-05 | End: 2022-08-10 | Stop reason: SDUPTHER

## 2022-05-05 RX ORDER — POTASSIUM CHLORIDE 20 MEQ/1
20 TABLET, EXTENDED RELEASE ORAL 2 TIMES DAILY
Qty: 180 TABLET | Refills: 1 | Status: SHIPPED | OUTPATIENT
Start: 2022-05-05

## 2022-05-05 RX ORDER — PANTOPRAZOLE SODIUM 40 MG/1
40 TABLET, DELAYED RELEASE ORAL DAILY
Qty: 90 TABLET | Refills: 1 | Status: SHIPPED | OUTPATIENT
Start: 2022-05-05 | End: 2022-10-21

## 2022-05-05 RX ORDER — SODIUM CHLORIDE 1 G/1
1 TABLET ORAL 2 TIMES DAILY
Qty: 60 TABLET | Refills: 5 | Status: SHIPPED | OUTPATIENT
Start: 2022-05-05 | End: 2022-06-22 | Stop reason: SDUPTHER

## 2022-06-02 ENCOUNTER — TELEPHONE (OUTPATIENT)
Dept: INTERNAL MEDICINE CLINIC | Age: 72
End: 2022-06-02

## 2022-06-02 DIAGNOSIS — G20 PARKINSON DISEASE (HCC): ICD-10-CM

## 2022-06-02 RX ORDER — CARBIDOPA AND LEVODOPA 25; 100 MG/1; MG/1
TABLET ORAL
Qty: 675 TABLET | Refills: 1 | Status: SHIPPED | OUTPATIENT
Start: 2022-06-02

## 2022-06-22 ENCOUNTER — OFFICE VISIT (OUTPATIENT)
Dept: INTERNAL MEDICINE CLINIC | Age: 72
End: 2022-06-22
Payer: MEDICARE

## 2022-06-22 VITALS
HEART RATE: 95 BPM | BODY MASS INDEX: 19.99 KG/M2 | WEIGHT: 120 LBS | DIASTOLIC BLOOD PRESSURE: 70 MMHG | HEIGHT: 65 IN | SYSTOLIC BLOOD PRESSURE: 132 MMHG | OXYGEN SATURATION: 98 % | TEMPERATURE: 97.8 F | RESPIRATION RATE: 16 BRPM

## 2022-06-22 DIAGNOSIS — I10 ESSENTIAL HYPERTENSION: ICD-10-CM

## 2022-06-22 DIAGNOSIS — E55.9 VITAMIN D DEFICIENCY: ICD-10-CM

## 2022-06-22 DIAGNOSIS — G31.9 CEREBRAL ATROPHY, MILD (HCC): ICD-10-CM

## 2022-06-22 DIAGNOSIS — E86.1 FLUID DEFICIT: ICD-10-CM

## 2022-06-22 DIAGNOSIS — R26.81 GAIT INSTABILITY: ICD-10-CM

## 2022-06-22 DIAGNOSIS — F41.9 ANXIETY: ICD-10-CM

## 2022-06-22 DIAGNOSIS — F32.A CHRONIC DEPRESSION: ICD-10-CM

## 2022-06-22 DIAGNOSIS — G20 PARKINSON'S SYNDROME (HCC): Primary | ICD-10-CM

## 2022-06-22 PROCEDURE — 3017F COLORECTAL CA SCREEN DOC REV: CPT | Performed by: INTERNAL MEDICINE

## 2022-06-22 PROCEDURE — 1090F PRES/ABSN URINE INCON ASSESS: CPT | Performed by: INTERNAL MEDICINE

## 2022-06-22 PROCEDURE — G8752 SYS BP LESS 140: HCPCS | Performed by: INTERNAL MEDICINE

## 2022-06-22 PROCEDURE — 99214 OFFICE O/P EST MOD 30 MIN: CPT | Performed by: INTERNAL MEDICINE

## 2022-06-22 PROCEDURE — G9717 DOC PT DX DEP/BP F/U NT REQ: HCPCS | Performed by: INTERNAL MEDICINE

## 2022-06-22 PROCEDURE — G8400 PT W/DXA NO RESULTS DOC: HCPCS | Performed by: INTERNAL MEDICINE

## 2022-06-22 PROCEDURE — G8754 DIAS BP LESS 90: HCPCS | Performed by: INTERNAL MEDICINE

## 2022-06-22 PROCEDURE — G8420 CALC BMI NORM PARAMETERS: HCPCS | Performed by: INTERNAL MEDICINE

## 2022-06-22 PROCEDURE — G8427 DOCREV CUR MEDS BY ELIG CLIN: HCPCS | Performed by: INTERNAL MEDICINE

## 2022-06-22 PROCEDURE — G8536 NO DOC ELDER MAL SCRN: HCPCS | Performed by: INTERNAL MEDICINE

## 2022-06-22 PROCEDURE — G0463 HOSPITAL OUTPT CLINIC VISIT: HCPCS | Performed by: INTERNAL MEDICINE

## 2022-06-22 PROCEDURE — 1101F PT FALLS ASSESS-DOCD LE1/YR: CPT | Performed by: INTERNAL MEDICINE

## 2022-06-22 RX ORDER — SODIUM CHLORIDE 1 G/1
1 TABLET ORAL 2 TIMES DAILY
Qty: 60 TABLET | Refills: 5 | Status: SHIPPED | OUTPATIENT
Start: 2022-06-22

## 2022-06-22 RX ORDER — BUSPIRONE HYDROCHLORIDE 15 MG/1
15 TABLET ORAL 3 TIMES DAILY
Qty: 90 TABLET | Refills: 5 | Status: SHIPPED | OUTPATIENT
Start: 2022-06-22 | End: 2022-08-10 | Stop reason: SDUPTHER

## 2022-06-22 RX ORDER — TRAZODONE HYDROCHLORIDE 50 MG/1
50 TABLET ORAL
Qty: 90 TABLET | Refills: 1 | Status: SHIPPED | OUTPATIENT
Start: 2022-06-22

## 2022-06-22 NOTE — PROGRESS NOTES
HISTORY OF PRESENT ILLNESS  America Qiu is a 71 y.o. female. Pt. comes in with her /caregiver for f/u. Has a few chronic medical issues as documented including Parkinson's disease, HTN, orthostatic hypotension, gait instability, depression/anxiety/insomnia, hyponatremia. Vital signs are stable. Has gained some weight. BMI 19.97. Reports overall being about the same. Reports continued weakness and unsteadiness especially in the legs. Not very active physically. Finishing home health PT. Plans to go to outpatient PT over the next few weeks. Depends on  for many ADLs. Gait is unsteady but denies any recent falls. Mostly at home and inactive. Her depression anxiety seems to be stable on current regimen. No recent hospitalizations. Her chronic back and leg pain is stable on Tylenol. Takes Tylenol with codeine as needed. On Sinemet for Parkinson's disease. Has not seen her neurologist in a while. All other chronic medical issues are stable on current treatment regimen. Has had Covid-19 vaccination. Reports taking proper precautions. Denies any related signs or symptoms. PMH/PSH/Allergies/Social History/medication list and most recent studies reviewed with patient. Tobacco use: No  Alcohol use: No  Reports compliance with medications and diet. Reports no other new c/o. HPI    Review of Systems   Constitutional: Negative. HENT: Negative. Eyes: Negative for blurred vision. Respiratory: Negative for shortness of breath. Cardiovascular: Negative for chest pain and leg swelling. Gastrointestinal: Negative for abdominal pain. Genitourinary: Negative for dysuria and frequency. Musculoskeletal: Positive for back pain and joint pain. Negative for falls. Skin: Negative. Neurological: Positive for dizziness, focal weakness (R leg) and weakness. Negative for sensory change and headaches. Psychiatric/Behavioral: Positive for depression and memory loss. Negative for hallucinations, substance abuse and suicidal ideas. The patient is nervous/anxious. The patient does not have insomnia. All other systems reviewed and are negative. Physical Exam  Vitals and nursing note reviewed. Constitutional:       General: She is not in acute distress. Appearance: She is well-developed. Comments: Pleasant lady  Using a cane   HENT:      Head: Normocephalic and atraumatic. Mouth/Throat:      Mouth: Mucous membranes are moist.   Eyes:      General: No scleral icterus. Conjunctiva/sclera: Conjunctivae normal.   Neck:      Thyroid: No thyromegaly. Vascular: No carotid bruit or JVD. Cardiovascular:      Rate and Rhythm: Normal rate and regular rhythm. Pulses: Normal pulses. Heart sounds: Normal heart sounds. No murmur heard. Pulmonary:      Effort: Pulmonary effort is normal. No respiratory distress. Breath sounds: Normal breath sounds. No wheezing or rales. Abdominal:      General: Bowel sounds are normal. There is no distension. Palpations: Abdomen is soft. Tenderness: There is no abdominal tenderness. There is no right CVA tenderness or left CVA tenderness. Musculoskeletal:         General: Tenderness (Mid and lower back, chronic) present. No deformity or signs of injury. Cervical back: Normal range of motion and neck supple. No rigidity. Right lower leg: No edema. Left lower leg: No edema. Comments: djd  Weak legs with muscle atrophy   Lymphadenopathy:      Cervical: No cervical adenopathy. Skin:     General: Skin is warm. Findings: No rash. Neurological:      Mental Status: She is alert and oriented to person, place, and time. Mental status is at baseline. Cranial Nerves: No cranial nerve deficit.       Coordination: Coordination abnormal.      Gait: Gait abnormal.   Psychiatric:         Behavior: Behavior normal.      Comments: Chronically anxious and depressed         ASSESSMENT and PLAN  Diagnoses and all orders for this visit:    1. Parkinson's syndrome ([facility] [address]  -     METABOLIC PANEL, COMPREHENSIVE; Future  -     CBC W/O DIFF; Future  Follow-up with neurologist as scheduled  Advised patient to increase physical activity as tolerated  2. Cerebral atrophy, mild ([facility] [address]      3. Chronic depression  Stable chronic condition. Continue current treatment/medications. 4. Anxiety  Refill    busPIRone (BUSPAR) 15 mg tablet; Take 1 Tablet by mouth three (3) times daily. 5. Gait instability  Fall precautions discussed    6. Essential hypertension  Monitor BP at home with goal of 140/90 or less   Stable chronic condition. Continue current treatment/medications. 7. Vitamin D deficiency  Continue vitamin D supplements  8. Fluid deficit/hyponatremia  -   Refill sodium chloride 1,000 mg soluble tablet; Take 1 Tablet by mouth two (2) times a day. 9.  Chronic insomnia  -Refill    traZODone (DESYREL) 50 mg tablet; Take 1 Tablet by mouth nightly. Follow-up and Dispositions    · Return in about 4 months (around 10/22/2022). All chronic medical problems are stable  Continue with current medical management and plan  lab results and schedule of future lab studies reviewed with patient  reviewed diet, exercise and weight control  reviewed medications and side effects in detail  F/u with other MD's/ providers as scheduled  COVID-19 precautions discussed with pt  An After Visit Summary was printed and given to the patient.

## 2022-06-22 NOTE — PROGRESS NOTES
Health Maintenance Due   Topic Date Due    Hepatitis C Screening  Never done    DTaP/Tdap/Td series (1 - Tdap) Never done    Colorectal Cancer Screening Combo  Never done    Shingrix Vaccine Age 50> (1 of 2) Never done    Pneumococcal 65+ years (1 - PCV) Never done    Breast Cancer Screen Mammogram  04/08/2018       Chief Complaint   Patient presents with    Wheezing    Extremity Weakness    Other     4 month follow up       1. Have you been to the ER, urgent care clinic since your last visit? Hospitalized since your last visit? No    2. Have you seen or consulted any other health care providers outside of the [address] [place] [address] since your last visit? Include any pap smears or colon screening. No    3) Do you have an Advance Directive on file? no    4) Are you interested in receiving information on Advance Directives? NO      Patient is accompanied by  I have received verbal consent from Maura Scott to discuss any/all medical information while they are present in the room.

## 2022-06-23 LAB
ALBUMIN SERPL-MCNC: 4.6 G/DL (ref 3.7–4.7)
ALBUMIN/GLOB SERPL: 2 {RATIO} (ref 1.2–2.2)
ALP SERPL-CCNC: 110 IU/L (ref 44–121)
ALT SERPL-CCNC: 3 IU/L (ref 0–32)
AST SERPL-CCNC: 12 IU/L (ref 0–40)
BILIRUB SERPL-MCNC: 0.3 MG/DL (ref 0–1.2)
BUN SERPL-MCNC: 16 MG/DL (ref 8–27)
BUN/CREAT SERPL: 24 (ref 12–28)
CALCIUM SERPL-MCNC: 9.7 MG/DL (ref 8.7–10.3)
CHLORIDE SERPL-SCNC: 98 MMOL/L (ref 96–106)
CO2 SERPL-SCNC: 23 MMOL/L (ref 20–29)
CREAT SERPL-MCNC: 0.68 MG/DL (ref 0.57–1)
EGFR: 93 ML/MIN/1.73
ERYTHROCYTE [DISTWIDTH] IN BLOOD BY AUTOMATED COUNT: 13.5 % (ref 11.7–15.4)
GLOBULIN SER CALC-MCNC: 2.3 G/DL (ref 1.5–4.5)
GLUCOSE SERPL-MCNC: 101 MG/DL (ref 65–99)
HCT VFR BLD AUTO: 37.6 % (ref 34–46.6)
HGB BLD-MCNC: 12.6 G/DL (ref 11.1–15.9)
MCH RBC QN AUTO: 28.3 PG (ref 26.6–33)
MCHC RBC AUTO-ENTMCNC: 33.5 G/DL (ref 31.5–35.7)
MCV RBC AUTO: 84 FL (ref 79–97)
PLATELET # BLD AUTO: 295 X10E3/UL (ref 150–450)
POTASSIUM SERPL-SCNC: 4.5 MMOL/L (ref 3.5–5.2)
PROT SERPL-MCNC: 6.9 G/DL (ref 6–8.5)
RBC # BLD AUTO: 4.46 X10E6/UL (ref 3.77–5.28)
SODIUM SERPL-SCNC: 136 MMOL/L (ref 134–144)
WBC # BLD AUTO: 8.3 X10E3/UL (ref 3.4–10.8)

## 2022-07-24 DIAGNOSIS — M54.6 THORACIC BACK PAIN, UNSPECIFIED BACK PAIN LATERALITY, UNSPECIFIED CHRONICITY: ICD-10-CM

## 2022-07-26 RX ORDER — ACETAMINOPHEN AND CODEINE PHOSPHATE 300; 30 MG/1; MG/1
TABLET ORAL
Qty: 60 TABLET | Refills: 0 | Status: SHIPPED | OUTPATIENT
Start: 2022-07-26 | End: 2022-08-25

## 2022-08-10 DIAGNOSIS — G31.9 CEREBRAL ATROPHY, MILD (HCC): ICD-10-CM

## 2022-08-10 DIAGNOSIS — M54.6 THORACIC BACK PAIN, UNSPECIFIED BACK PAIN LATERALITY, UNSPECIFIED CHRONICITY: ICD-10-CM

## 2022-08-10 DIAGNOSIS — R11.2 NON-INTRACTABLE VOMITING WITH NAUSEA: ICD-10-CM

## 2022-08-10 RX ORDER — ONDANSETRON 4 MG/1
4 TABLET, ORALLY DISINTEGRATING ORAL
Qty: 30 TABLET | Refills: 5 | Status: SHIPPED | OUTPATIENT
Start: 2022-08-10

## 2022-08-10 RX ORDER — DICLOFENAC SODIUM 10 MG/G
GEL TOPICAL 4 TIMES DAILY
Qty: 100 G | Refills: 5 | Status: SHIPPED | OUTPATIENT
Start: 2022-08-10

## 2022-08-10 RX ORDER — LIDOCAINE 50 MG/G
PATCH TOPICAL
Qty: 10 EACH | Refills: 5 | Status: SHIPPED | OUTPATIENT
Start: 2022-08-10

## 2022-08-10 RX ORDER — BUSPIRONE HYDROCHLORIDE 15 MG/1
15 TABLET ORAL 3 TIMES DAILY
Qty: 90 TABLET | Refills: 5 | Status: SHIPPED | OUTPATIENT
Start: 2022-08-10

## 2022-08-15 ENCOUNTER — DOCUMENTATION ONLY (OUTPATIENT)
Dept: INTERNAL MEDICINE CLINIC | Age: 72
End: 2022-08-15

## 2022-08-15 NOTE — PROGRESS NOTES
Chief Complaint   Patient presents with    Prior Auth     Lidocaine 5% patches         Deniedon August 11  Your request has been denied

## 2022-10-21 DIAGNOSIS — K21.00 GASTROESOPHAGEAL REFLUX DISEASE WITH ESOPHAGITIS, UNSPECIFIED WHETHER HEMORRHAGE: ICD-10-CM

## 2022-10-21 DIAGNOSIS — F51.04 CHRONIC INSOMNIA: ICD-10-CM

## 2022-10-21 RX ORDER — QUETIAPINE FUMARATE 25 MG/1
25 TABLET, FILM COATED ORAL
Qty: 90 TABLET | Refills: 0 | Status: SHIPPED | OUTPATIENT
Start: 2022-10-21

## 2022-10-21 RX ORDER — PANTOPRAZOLE SODIUM 40 MG/1
TABLET, DELAYED RELEASE ORAL
Qty: 90 TABLET | Refills: 0 | Status: SHIPPED | OUTPATIENT
Start: 2022-10-21

## 2023-02-08 ENCOUNTER — TELEPHONE (OUTPATIENT)
Dept: INTERNAL MEDICINE CLINIC | Age: 73
End: 2023-02-08

## 2023-02-10 ENCOUNTER — TELEPHONE (OUTPATIENT)
Dept: INTERNAL MEDICINE CLINIC | Age: 73
End: 2023-02-10

## 2023-02-10 ENCOUNTER — OFFICE VISIT (OUTPATIENT)
Dept: INTERNAL MEDICINE CLINIC | Age: 73
End: 2023-02-10
Payer: MEDICARE

## 2023-02-10 VITALS
RESPIRATION RATE: 16 BRPM | BODY MASS INDEX: 19.99 KG/M2 | DIASTOLIC BLOOD PRESSURE: 80 MMHG | HEIGHT: 65 IN | HEART RATE: 77 BPM | WEIGHT: 120 LBS | SYSTOLIC BLOOD PRESSURE: 138 MMHG | OXYGEN SATURATION: 99 % | TEMPERATURE: 98 F

## 2023-02-10 DIAGNOSIS — G20 PARKINSON'S SYNDROME (HCC): Primary | ICD-10-CM

## 2023-02-10 DIAGNOSIS — F11.99 OPIOID USE, UNSPECIFIED WITH UNSPECIFIED OPIOID-INDUCED DISORDER (HCC): ICD-10-CM

## 2023-02-10 DIAGNOSIS — F51.04 CHRONIC INSOMNIA: ICD-10-CM

## 2023-02-10 DIAGNOSIS — R26.81 GAIT INSTABILITY: ICD-10-CM

## 2023-02-10 DIAGNOSIS — Z00.00 MEDICARE ANNUAL WELLNESS VISIT, SUBSEQUENT: ICD-10-CM

## 2023-02-10 DIAGNOSIS — R11.2 NON-INTRACTABLE VOMITING WITH NAUSEA: ICD-10-CM

## 2023-02-10 DIAGNOSIS — F32.A CHRONIC DEPRESSION: ICD-10-CM

## 2023-02-10 DIAGNOSIS — G31.9 CEREBRAL ATROPHY, MILD (HCC): ICD-10-CM

## 2023-02-10 RX ORDER — ONDANSETRON 4 MG/1
4 TABLET, ORALLY DISINTEGRATING ORAL
Qty: 30 TABLET | Refills: 5 | Status: SHIPPED | OUTPATIENT
Start: 2023-02-10

## 2023-02-10 RX ORDER — HYDROCODONE BITARTRATE AND ACETAMINOPHEN 5; 325 MG/1; MG/1
TABLET ORAL
COMMUNITY
End: 2023-02-10

## 2023-02-10 RX ORDER — HYDRALAZINE HYDROCHLORIDE 10 MG/1
10 TABLET, FILM COATED ORAL DAILY
COMMUNITY

## 2023-02-10 RX ORDER — MIDODRINE HYDROCHLORIDE 5 MG/1
5 TABLET ORAL DAILY
COMMUNITY
End: 2023-02-10 | Stop reason: SDUPTHER

## 2023-02-10 RX ORDER — MIDODRINE HYDROCHLORIDE 5 MG/1
5 TABLET ORAL
Qty: 90 TABLET | Refills: 5 | Status: SHIPPED | OUTPATIENT
Start: 2023-02-10

## 2023-02-10 NOTE — PROGRESS NOTES
Schedule of Personalized Health Plan  (Provide Copy to Patient)  The best way to stay healthy is to live a healthy lifestyle. A healthy lifestyle includes regular exercise, eating a well-balanced diet, keeping a healthy weight and not smoking. Regular physical exams and screening tests are another important way to take care of yourself. Preventive exams provided by health care providers can find health problems early when treatment works best and can keep you from getting certain diseases or illnesses. Preventive services include exams, lab tests, screenings, shots, monitoring and information to help you take care of your own health. All people over 65 should have a pneumonia shot. Pneumonia shots are usually only needed once in a lifetime unless your doctor decides differently. All people over 65 should have a yearly flu shot. People over 65 are at medium to high risk for Hepatitis B. Three shots are needed for complete protection. In addition to your physical exam, some screening tests are recommended:    Bone mass measurement (dexa scan) is recommended every two years  Diabetes Mellitus screening is recommended every year. Glaucoma is an eye disease caused by high pressure in the eye. An eye exam is recommended every year. Cardiovascular screening tests that check your cholesterol and other blood fat (lipid) levels are recommended every five years. Colorectal Cancer screening tests help to find pre-cancerous polyps (growths in the colon) so they can be removed before they turn into cancer. Tests ordered for screening depend on your personal and family history risk factors.     Screening for Breast Cancer is recommended yearly with a mammogram.    Screening for Cervical Cancer is recommended every two years (annually for certain risk factors, such as previous history of STD or abnormal PAP in past 7 years), with a Pelvic Exam with PAP    Here is a list of your current Health Maintenance items with a due date:  Health Maintenance   Topic Date Due    Hepatitis C Screening  Never done    DTaP/Tdap/Td series (1 - Tdap) Never done    Colorectal Cancer Screening Combo  Never done    Shingles Vaccine (1 of 2) Never done    Pneumococcal 65+ years (1 - PCV) Never done    COVID-19 Vaccine (5 - Booster) 11/16/2021    Flu Vaccine (1) Never done    Breast Cancer Screen Mammogram  04/08/2023    Depression Monitoring  06/22/2023    Medicare Yearly Exam  02/11/2024    Lipid Screen  06/15/2026    Bone Densitometry (Dexa) Screening  Addressed       This is the Subsequent Medicare Annual Wellness Exam, performed 12 months or more after the Initial AWV or the last Subsequent AWV    I have reviewed the patient's medical history in detail and updated the computerized patient record. Assessment/Plan   Education and counseling provided:  Are appropriate based on today's review and evaluation    1. Parkinson's syndrome ([facility] [address]  2. Cerebral atrophy, mild (HCC)  3. Opioid use, unspecified with unspecified opioid-induced disorder ([facility] [address]  4. Chronic depression  5. Gait instability  6. Chronic insomnia  7.  Medicare annual wellness visit, subsequent       Depression Risk Factor Screening     3 most recent PHQ Screens 2/10/2023   PHQ Not Done -   Little interest or pleasure in doing things Not at all   Feeling down, depressed, irritable, or hopeless Not at all   Total Score PHQ 2 0   Trouble falling or staying asleep, or sleeping too much -   Feeling tired or having little energy -   Poor appetite, weight loss, or overeating -   Feeling bad about yourself - or that you are a failure or have let yourself or your family down -   Trouble concentrating on things such as school, work, reading, or watching TV -   Moving or speaking so slowly that other people could have noticed; or the opposite being so fidgety that others notice -   Thoughts of being better off dead, or hurting yourself in some way -   PHQ 9 Score -   How difficult have these problems made it for you to do your work, take care of your home and get along with others -       Alcohol & Drug Abuse Risk Screen    Do you average more than 1 drink per night or more than 7 drinks a week:  No    On any one occasion in the past three months have you have had more than 3 drinks containing alcohol:  No       Opioid Risk: (Low risk score <55, High risk score ?55)  Opioid risk score: 19      Click here to complete the Controlled Substance Monitoring SmartForm    Last PDMP Franco as Reviewed:  Review User Review Instant Review Result            Functional Ability and Level of Safety    Hearing: Hearing is good. Activities of Daily Living: The home contains: handrails and grab bars  Patient needs help with:  transportation and managing money      Ambulation: with difficulty, uses a walker     Fall Risk:  Fall Risk Assessment, last 12 mths 2/10/2023   Able to walk? Yes   Fall in past 12 months? 0   Do you feel unsteady? 0   Are you worried about falling 0   Is TUG test greater than 12 seconds? -   Is the gait abnormal? -   Number of falls in past 12 months -   Fall with injury?  -      Abuse Screen:  Patient is not abused       Cognitive Screening    Has your family/caregiver stated any concerns about your memory: no     Cognitive Screening: A+O x 3    Health Maintenance Due     Health Maintenance Due   Topic Date Due    Hepatitis C Screening  Never done    DTaP/Tdap/Td series (1 - Tdap) Never done    Colorectal Cancer Screening Combo  Never done    Shingles Vaccine (1 of 2) Never done    Pneumococcal 65+ years (1 - PCV) Never done    COVID-19 Vaccine (5 - Booster) 11/16/2021    Flu Vaccine (1) Never done       Patient Care Team   Patient Care Team:  Bethanie Santa DO as PCP - General  Bethanie Santa DO as PCP - [facility] Empaneled Provider  Alessandro Morrissey MD (Orthopedic Surgery)  Kamran Norton MD (Orthopedic Surgery)  Henry Merida DO (Neurology)  Larnell Boast, Gabriela (Psychology)  Adrienne Valadez MD (Cardiovascular Disease Physician)    History     Patient Active Problem List   Diagnosis Code    Essential hypertension I10    Hypokalemia E87.6    CAD (coronary artery disease) I25.10    Hypercholesteremia E78.00    Vitamin D deficiency E55.9    Onychomycosis of toenail B35.1    Chronic leg pain M79.606, G89.29    Right LBP M54.50    Sacroiliac joint dysfunction of right side M53.3    Right buttock pain M79.18    Right leg pain M79.604    Trochanteric bursitis M70.60    Gluteal tendinitis M76.00    Gait instability R26.81    Cerebral atrophy, mild (HCC) G31.9    Weakness generalized R53.1    Sleep disorder, unspecified G47.9    Fidgeting R45.89    Neurological disorder G98.8    Frequent falls R29.6    Medicare annual wellness visit, subsequent Z00.00    Parkinson's syndrome ([facility] [address] G20    Chronic depression F32. A    Anxiety F41.9    Iron deficiency anemia, unspecified iron deficiency anemia type D50.9    Gastroesophageal reflux disease without esophagitis K21.9    S/P kyphoplasty Z98.890    Hyponatremia E87.1    Orthostatic hypotension I95.1    Chronic insomnia F51.04    Closed fracture of sacrum, unspecified portion of sacrum, sequela S32.10XS    Opioid use, unspecified with unspecified opioid-induced disorder F11.99    Non-intractable vomiting with nausea, unspecified vomiting type R11.2     Past Medical History:   Diagnosis Date    CAD (coronary artery disease)     Hypertension     Hypokalaemia     Sacrum and coccyx fracture ([facility] [address] 06/05/2021      Past Surgical History:   Procedure Laterality Date    HX APPENDECTOMY      HX OOPHORECTOMY Right     HX SHOULDER REPLACEMENT      HX TONSILLECTOMY      IR DRAIN HEMATOMA SEROMA FLUID       Current Outpatient Medications   Medication Sig Dispense Refill    hydrALAZINE (APRESOLINE) 10 mg tablet Take 10 mg by mouth daily. HYDROcodone-acetaminophen (NORCO) 5-325 mg per tablet Take  by mouth every four (4) hours as needed for Pain. midodrine (PROAMATINE) 5 mg tablet Take 5 mg by mouth daily. QUEtiapine (SEROquel) 25 mg tablet Take 1 tablet by mouth nightly 90 Tablet 0    pantoprazole (PROTONIX) 40 mg tablet Take 1 tablet by mouth once daily 90 Tablet 0    sertraline (ZOLOFT) 100 mg tablet Take 1 tablet by mouth once daily 90 Tablet 1    lidocaine (LIDODERM) 5 % Apply patch to the affected area/L posterior rib area for 12 hours a day and remove for 12 hours a day. Dx: R07.81 10 Each 5    diclofenac (Voltaren) 1 % gel Apply  to affected area four (4) times daily. 100 g 5    ondansetron (ZOFRAN ODT) 4 mg disintegrating tablet Take 1 Tablet by mouth every eight (8) hours as needed for Nausea or Vomiting. 30 Tablet 5    busPIRone (BUSPAR) 15 mg tablet Take 1 Tablet by mouth three (3) times daily. 90 Tablet 5    traZODone (DESYREL) 50 mg tablet Take 1 Tablet by mouth nightly. 90 Tablet 1    sodium chloride 1,000 mg soluble tablet Take 1 Tablet by mouth two (2) times a day. 60 Tablet 5    carbidopa-levodopa (SINEMET)  mg per tablet 1.5 tablets every 4 hours during the day, total of 5 times daily 675 Tablet 1    potassium chloride (K-DUR, KLOR-CON M20) 20 mEq tablet Take 1 Tablet by mouth two (2) times a day. 180 Tablet 1    polyethylene glycol (MIRALAX) 17 gram/dose powder Take 17 g by mouth two (2) times a day. 595 g 1    glucosamine sulfate 1,000 mg cap Take 500 mg by mouth. cholecalciferol (VITAMIN D3) (1000 Units /25 mcg) tablet Take 2,000 Units by mouth daily. acetaminophen-codeine (TYLENOL #3) 300-30 mg per tablet Take 1 Tablet by mouth daily as needed for Pain for up to 30 days. (Patient not taking: Reported on 2/10/2023) 30 Tablet 0    acetaminophen (Tylenol Extra Strength) 500 mg tablet Take  by mouth every six (6) hours as needed for Pain. (Patient not taking: Reported on 2/10/2023) 90 Tablet 2     Allergies   Allergen Reactions    Penicillins Swelling     Other reaction(s): Other  Reaction Type:  Allergy; Reaction(s): Swelling or edema       Family History   Problem Relation Age of Onset    Diabetes Mother      Social History     Tobacco Use    Smoking status: Former     Packs/day: 0.50     Types: Cigarettes     Quit date: 10/19/2011     Years since quittin.3    Smokeless tobacco: Never   Substance Use Topics    Alcohol use: Not Currently     Alcohol/week: 1.0 - 2.0 standard drink     Types: 1 - 2 Standard drinks or equivalent per week     Comment: one drink per week         Honey Dan DO

## 2023-02-10 NOTE — PROGRESS NOTES
Subjective  Ezekiel Fowler is a 71 y.o. female. Pt. comes in for f/u. Has a few chronic medical issues as documented. Reports being hospitalized at [facility] in December and then going to SNF. Had a fall with broken right ribs. Reports some pain but overall much better. Has been switched from Tylenol 3 to Norco.  Gait is unsteady. Uses a walker but not all the time according to her . No recent falls. Followed by neurologist for parkinsonian syndrome. Remains on Sinemet which is helping. Her chronic depression and anxiety seems to be stable on current medications. Remains on Seroquel at bedtime which helps her sleep. BP and cardiac status stable on medications. On midodrine 3 times daily for orthostatic hypotension. All other chronic medical issues are stable on current treatment regimen. Denies any issues with  Covid-19 vaccination. PMH/PSH/Allergies/Social History/medication list and most recent studies reviewed with patient. Tobacco use: No  Alcohol use: no  Reports compliance with medications and diet. Trying to be active physically as tolerated. Reports no other new c/o. Past Medical History:   Diagnosis Date    CAD (coronary artery disease)     Hypertension     Hypokalaemia     Sacrum and coccyx fracture (HCC) 06/05/2021       Allergies   Allergen Reactions    Penicillins Swelling     Other reaction(s): Other  Reaction Type: Allergy; Reaction(s): Swelling or edema       Current Outpatient Medications on File Prior to Visit   Medication Sig Dispense Refill    hydrALAZINE (APRESOLINE) 10 mg tablet Take 10 mg by mouth daily.       QUEtiapine (SEROquel) 25 mg tablet Take 1 tablet by mouth nightly 90 Tablet 0    pantoprazole (PROTONIX) 40 mg tablet Take 1 tablet by mouth once daily 90 Tablet 0    sertraline (ZOLOFT) 100 mg tablet Take 1 tablet by mouth once daily 90 Tablet 1    lidocaine (LIDODERM) 5 % Apply patch to the affected area/L posterior rib area for 12 hours a day and remove for 12 hours a day. Dx: R07.81 10 Each 5    diclofenac (Voltaren) 1 % gel Apply  to affected area four (4) times daily. 100 g 5    busPIRone (BUSPAR) 15 mg tablet Take 1 Tablet by mouth three (3) times daily. 90 Tablet 5    traZODone (DESYREL) 50 mg tablet Take 1 Tablet by mouth nightly. 90 Tablet 1    sodium chloride 1,000 mg soluble tablet Take 1 Tablet by mouth two (2) times a day. 60 Tablet 5    carbidopa-levodopa (SINEMET)  mg per tablet 1.5 tablets every 4 hours during the day, total of 5 times daily 675 Tablet 1    potassium chloride (K-DUR, KLOR-CON M20) 20 mEq tablet Take 1 Tablet by mouth two (2) times a day. 180 Tablet 1    polyethylene glycol (MIRALAX) 17 gram/dose powder Take 17 g by mouth two (2) times a day. 595 g 1    glucosamine sulfate 1,000 mg cap Take 500 mg by mouth. cholecalciferol (VITAMIN D3) (1000 Units /25 mcg) tablet Take 2,000 Units by mouth daily. [DISCONTINUED] HYDROcodone-acetaminophen (NORCO) 5-325 mg per tablet Take  by mouth every four (4) hours as needed for Pain. [DISCONTINUED] midodrine (PROAMATINE) 5 mg tablet Take 5 mg by mouth daily. acetaminophen-codeine (TYLENOL #3) 300-30 mg per tablet Take 1 Tablet by mouth daily as needed for Pain for up to 30 days. (Patient not taking: Reported on 2/10/2023) 30 Tablet 0    [DISCONTINUED] ondansetron (ZOFRAN ODT) 4 mg disintegrating tablet Take 1 Tablet by mouth every eight (8) hours as needed for Nausea or Vomiting. 30 Tablet 5    acetaminophen (Tylenol Extra Strength) 500 mg tablet Take  by mouth every six (6) hours as needed for Pain. (Patient not taking: Reported on 2/10/2023) 90 Tablet 2     No current facility-administered medications on file prior to visit.        Visit Vitals  Blood Pressure 138/80 (BP 1 Location: Left upper arm, BP Patient Position: Sitting, BP Cuff Size: Adult)   Pulse 77   Temperature 98 °F (36.7 °C) (Oral)   Respiration 16   Height 5' 5\" (1.651 m)   Weight 120 lb (54.4 kg) Oxygen Saturation 99%   Body Mass Index 19.97 kg/m²             HPI  Review of Systems   Constitutional: Negative. HENT: Negative. Eyes:  Negative for blurred vision. Respiratory:  Negative for shortness of breath. Cardiovascular:  Negative for chest pain and leg swelling. Gastrointestinal:  Negative for abdominal pain. Genitourinary:  Negative for dysuria and frequency. Musculoskeletal:  Positive for back pain, falls and joint pain. Skin: Negative. Neurological:  Positive for dizziness, focal weakness (R leg) and weakness. Negative for sensory change and headaches. Psychiatric/Behavioral:  Positive for depression and memory loss. Negative for hallucinations, substance abuse and suicidal ideas. The patient is nervous/anxious. The patient does not have insomnia. All other systems reviewed and are negative. Objective  Physical Exam  Vitals and nursing note reviewed. Constitutional:       General: She is not in acute distress. Appearance: She is well-developed. Comments: Pleasant lady  Using a walker   HENT:      Head: Normocephalic and atraumatic. Mouth/Throat:      Mouth: Mucous membranes are moist.   Eyes:      General: No scleral icterus. Conjunctiva/sclera: Conjunctivae normal.   Neck:      Thyroid: No thyromegaly. Vascular: No carotid bruit or JVD. Cardiovascular:      Rate and Rhythm: Normal rate and regular rhythm. Pulses: Normal pulses. Heart sounds: Normal heart sounds. No murmur heard. Pulmonary:      Effort: Pulmonary effort is normal. No respiratory distress. Breath sounds: Normal breath sounds. No wheezing or rales. Abdominal:      General: Bowel sounds are normal. There is no distension. Palpations: Abdomen is soft. Tenderness: There is no abdominal tenderness. There is no right CVA tenderness or left CVA tenderness.    Musculoskeletal:         General: Tenderness (Mid and lower back, chronic//R lower ribs, no bruise) present. No deformity or signs of injury. Cervical back: Normal range of motion and neck supple. No rigidity. Right lower leg: No edema. Left lower leg: No edema. Comments: djd  Weak legs with muscle atrophy   Lymphadenopathy:      Cervical: No cervical adenopathy. Skin:     General: Skin is warm. Findings: No rash. Neurological:      Mental Status: She is alert and oriented to person, place, and time. Mental status is at baseline. Cranial Nerves: No cranial nerve deficit. Coordination: Coordination abnormal.      Gait: Gait abnormal.   Psychiatric:         Behavior: Behavior normal.      Comments: Chronically anxious and depressed        Assessment & Plan    ICD-10-CM ICD-9-CM    1. Parkinson's syndrome ([facility] [address]  G20 332.0 Stable on Sinemet. Followed by neurologist.      2. Cerebral atrophy, mild (HCC)  G31.9 331.9       3. Opioid use, unspecified with unspecified opioid-induced disorder (HCC)  F11.99 292.9 Continue Tylenol with codeine twice daily for pain. Stop Golden.     305.50       4. Chronic depression  F32. A 311 Stable on Zoloft and BuSpar      5. Gait instability  R26.81 781. 2 Fall precautions discussed        6. Chronic insomnia  F51.04 780.52 Stable on Seroquel      7. Medicare annual wellness visit, subsequent  Z00.00 V70.0       8. Non-intractable vomiting with nausea  R11.2 787.01 ondansetron (ZOFRAN ODT) 4 mg disintegrating tablet        Orders Placed This Encounter    ondansetron (ZOFRAN ODT) 4 mg disintegrating tablet     Sig: Take 1 Tablet by mouth every eight (8) hours as needed for Nausea or Vomiting. Dispense:  30 Tablet     Refill:  5    midodrine (PROAMATINE) 5 mg tablet     Sig: Take 1 Tablet by mouth three (3) times daily (with meals). Dispense:  90 Tablet     Refill:  5     Follow-up and Dispositions    Return in about 6 months (around 8/10/2023).      All chronic medical problems are stable  Continue with current medical management and plan  lab results and schedule of future lab studies reviewed with patient  reviewed diet, exercise and weight control  reviewed medications and side effects in detail  F/u with other MD's/ providers as scheduled  COVID-19 precautions discussed with pt  An After Visit Summary was printed and given to the patient.     Glo Carrel, DO

## 2023-02-10 NOTE — PROGRESS NOTES
Health Maintenance Due   Topic Date Due    Hepatitis C Screening  Never done    DTaP/Tdap/Td series (1 - Tdap) Never done    Colorectal Cancer Screening Combo  Never done    Shingles Vaccine (1 of 2) Never done    Pneumococcal 65+ years (1 - PCV) Never done    COVID-19 Vaccine (5 - Booster) 11/16/2021    Flu Vaccine (1) Never done    Medicare Yearly Exam  01/27/2023       Chief Complaint   Patient presents with    Hypertension    GERD    Follow Up Chronic Condition       1. Have you been to the ER, urgent care clinic since your last visit? Hospitalized since your last visit? Yes, [facility], 1/2/23       2. Have you seen or consulted any other health care providers outside of the [address] [place] [address] since your last visit? Include any pap smears or colon screening. No    3) Do you have an Advance Directive on file? no    4) Are you interested in receiving information on Advance Directives? NO      Patient is accompanied by self I have received verbal consent from Joan Lawson to discuss any/all medical information while they are present in the room.

## 2023-02-13 DIAGNOSIS — I10 ESSENTIAL HYPERTENSION: Primary | ICD-10-CM

## 2023-02-13 RX ORDER — HYDRALAZINE HYDROCHLORIDE 10 MG/1
10 TABLET, FILM COATED ORAL DAILY
Qty: 90 TABLET | Refills: 1 | Status: SHIPPED | OUTPATIENT
Start: 2023-02-13

## 2023-02-13 NOTE — TELEPHONE ENCOUNTER
Requested Prescriptions     Pending Prescriptions Disp Refills    hydrALAZINE (APRESOLINE) 10 mg tablet       Sig: Take 1 Tablet by mouth daily.          [address] , [address] [place] [address]  [address] [place] [address]  Phone: [phone] Fax: [phone]       2/10/2023 is LAST OFFICE VISIT     Future Appointments   Date Time Provider Sarah Jhaveri   8/11/2023  9:15 AM DO SANDI Odonnell BS AMB

## 2023-02-15 DIAGNOSIS — M54.6 THORACIC BACK PAIN, UNSPECIFIED BACK PAIN LATERALITY, UNSPECIFIED CHRONICITY: ICD-10-CM

## 2023-02-16 RX ORDER — ACETAMINOPHEN AND CODEINE PHOSPHATE 300; 30 MG/1; MG/1
TABLET ORAL
Qty: 30 TABLET | Refills: 0 | Status: SHIPPED | OUTPATIENT
Start: 2023-02-16 | End: 2023-03-18

## 2023-03-02 ENCOUNTER — TELEPHONE (OUTPATIENT)
Dept: INTERNAL MEDICINE CLINIC | Age: 73
End: 2023-03-02

## 2023-03-02 NOTE — TELEPHONE ENCOUNTER
Patient is requesting a call back to discuss what X-Rays she will be needing. Patient thinks she fractured her tail bone. Patient stated that she is in excruciating pain. Patient was advised to go to the ER. Patient stated that she will be going to [facility] ER but would like a call back immediately.

## 2023-03-02 NOTE — TELEPHONE ENCOUNTER
Call placed to patient, patient states she went to ER on Sunday because of pain but they only did xrays of her chest. Explained to patient that last office visit note did not talk about any additional xrays. Advised patient to seek ED or local urgent care if pain is this bad. Patient states pain is very excruciating and will go to local ed.

## 2023-03-08 ENCOUNTER — OFFICE VISIT (OUTPATIENT)
Dept: INTERNAL MEDICINE CLINIC | Age: 73
End: 2023-03-08
Payer: MEDICARE

## 2023-03-08 VITALS
HEIGHT: 65 IN | SYSTOLIC BLOOD PRESSURE: 128 MMHG | OXYGEN SATURATION: 99 % | HEART RATE: 87 BPM | TEMPERATURE: 98 F | RESPIRATION RATE: 16 BRPM | BODY MASS INDEX: 20.33 KG/M2 | WEIGHT: 122 LBS | DIASTOLIC BLOOD PRESSURE: 80 MMHG

## 2023-03-08 DIAGNOSIS — M54.50 ACUTE LEFT-SIDED LOW BACK PAIN WITHOUT SCIATICA: ICD-10-CM

## 2023-03-08 DIAGNOSIS — R30.0 DYSURIA: ICD-10-CM

## 2023-03-08 DIAGNOSIS — S32.000A COMPRESSION FRACTURE OF LUMBAR VERTEBRA, UNSPECIFIED LUMBAR VERTEBRAL LEVEL, INITIAL ENCOUNTER (HCC): ICD-10-CM

## 2023-03-08 DIAGNOSIS — R29.6 FREQUENT FALLS: Primary | ICD-10-CM

## 2023-03-08 DIAGNOSIS — R11.0 CHRONIC NAUSEA: ICD-10-CM

## 2023-03-08 DIAGNOSIS — G20 PARKINSON'S SYNDROME (HCC): ICD-10-CM

## 2023-03-08 DIAGNOSIS — R26.81 GAIT INSTABILITY: ICD-10-CM

## 2023-03-08 DIAGNOSIS — R11.2 NON-INTRACTABLE VOMITING WITH NAUSEA: ICD-10-CM

## 2023-03-08 DIAGNOSIS — Z98.890 S/P KYPHOPLASTY: ICD-10-CM

## 2023-03-08 LAB
BILIRUB UR QL STRIP: NEGATIVE
GLUCOSE UR-MCNC: NEGATIVE MG/DL
KETONES P FAST UR STRIP-MCNC: NORMAL MG/DL
PH UR STRIP: 6.5 [PH] (ref 4.6–8)
PROT UR QL STRIP: NEGATIVE
SP GR UR STRIP: 1.02 (ref 1–1.03)
UA UROBILINOGEN AMB POC: NORMAL (ref 0.2–1)
URINALYSIS CLARITY POC: CLEAR
URINALYSIS COLOR POC: NORMAL
URINE BLOOD POC: NEGATIVE
URINE LEUKOCYTES POC: NEGATIVE
URINE NITRITES POC: NEGATIVE

## 2023-03-08 PROCEDURE — G0463 HOSPITAL OUTPT CLINIC VISIT: HCPCS | Performed by: INTERNAL MEDICINE

## 2023-03-08 PROCEDURE — G8420 CALC BMI NORM PARAMETERS: HCPCS | Performed by: INTERNAL MEDICINE

## 2023-03-08 PROCEDURE — G8427 DOCREV CUR MEDS BY ELIG CLIN: HCPCS | Performed by: INTERNAL MEDICINE

## 2023-03-08 PROCEDURE — G9717 DOC PT DX DEP/BP F/U NT REQ: HCPCS | Performed by: INTERNAL MEDICINE

## 2023-03-08 PROCEDURE — 81003 URINALYSIS AUTO W/O SCOPE: CPT | Performed by: INTERNAL MEDICINE

## 2023-03-08 PROCEDURE — 1101F PT FALLS ASSESS-DOCD LE1/YR: CPT | Performed by: INTERNAL MEDICINE

## 2023-03-08 PROCEDURE — 3017F COLORECTAL CA SCREEN DOC REV: CPT | Performed by: INTERNAL MEDICINE

## 2023-03-08 PROCEDURE — 1090F PRES/ABSN URINE INCON ASSESS: CPT | Performed by: INTERNAL MEDICINE

## 2023-03-08 PROCEDURE — G8400 PT W/DXA NO RESULTS DOC: HCPCS | Performed by: INTERNAL MEDICINE

## 2023-03-08 PROCEDURE — 99214 OFFICE O/P EST MOD 30 MIN: CPT | Performed by: INTERNAL MEDICINE

## 2023-03-08 PROCEDURE — G8536 NO DOC ELDER MAL SCRN: HCPCS | Performed by: INTERNAL MEDICINE

## 2023-03-08 RX ORDER — ONDANSETRON 4 MG/1
4 TABLET, ORALLY DISINTEGRATING ORAL
Qty: 30 TABLET | Refills: 5 | Status: SHIPPED | OUTPATIENT
Start: 2023-03-08

## 2023-03-08 NOTE — PROGRESS NOTES
Health Maintenance Due   Topic Date Due    Hepatitis C Screening  Never done    DTaP/Tdap/Td series (1 - Tdap) Never done    Colorectal Cancer Screening Combo  Never done    Shingles Vaccine (1 of 2) Never done    Pneumococcal 65+ years (1 - PCV) Never done    COVID-19 Vaccine (5 - Booster) 11/16/2021    Flu Vaccine (1) Never done    Breast Cancer Screen Mammogram  04/08/2023       No chief complaint on file. 1. Have you been to the ER, urgent care clinic since your last visit? Hospitalized since your last visit? No    2. Have you seen or consulted any other health care providers outside of the [address] [place] [address] since your last visit? Include any pap smears or colon screening. No    3) Do you have an Advance Directive on file? no    4) Are you interested in receiving information on Advance Directives? NO      Patient is accompanied by self I have received verbal consent from Jennifer Villatoro to discuss any/all medical information while they are present in the room.

## 2023-03-12 PROBLEM — Z00.00 MEDICARE ANNUAL WELLNESS VISIT, SUBSEQUENT: Status: RESOLVED | Noted: 2017-05-02 | Resolved: 2023-03-12

## 2023-03-16 ENCOUNTER — HOSPITAL ENCOUNTER (OUTPATIENT)
Dept: MRI IMAGING | Age: 73
Discharge: HOME | End: 2023-03-16
Attending: INTERNAL MEDICINE
Payer: MEDICARE

## 2023-03-16 DIAGNOSIS — M54.50 ACUTE LEFT-SIDED LOW BACK PAIN WITHOUT SCIATICA: ICD-10-CM

## 2023-03-16 DIAGNOSIS — S32.000A COMPRESSION FRACTURE OF LUMBAR VERTEBRA, UNSPECIFIED LUMBAR VERTEBRAL LEVEL, INITIAL ENCOUNTER (HCC): ICD-10-CM

## 2023-03-16 DIAGNOSIS — R26.81 GAIT INSTABILITY: ICD-10-CM

## 2023-03-16 DIAGNOSIS — Z98.890 S/P KYPHOPLASTY: ICD-10-CM

## 2023-03-16 DIAGNOSIS — R29.6 FREQUENT FALLS: ICD-10-CM

## 2023-03-16 PROCEDURE — 72148 MRI LUMBAR SPINE W/O DYE: CPT

## 2023-03-17 ENCOUNTER — TELEPHONE (OUTPATIENT)
Dept: INTERNAL MEDICINE CLINIC | Age: 73
End: 2023-03-17

## 2023-03-17 NOTE — TELEPHONE ENCOUNTER
Lionel Martell saw pt today for re assessment for Occupational therapy to extend for 7 more ot visits.  Please call back for verbal authorization

## 2023-03-30 ENCOUNTER — TELEPHONE (OUTPATIENT)
Dept: INTERNAL MEDICINE CLINIC | Age: 73
End: 2023-03-30

## 2023-03-30 NOTE — TELEPHONE ENCOUNTER
Call placed to pt left  for call back , call placed to pt  and spoke with him, pt is at [facility]. She was admitted on 3/19 and has already had kyphoplasty done. He states she may be discharged to [facility] , but not sure yet. He will keep us updated.

## 2023-03-31 NOTE — PROGRESS NOTES
Bean Alejandro is a 68 y.o. female. Pt. comes in with her  for f/u. Has a few chronic medical issues as documented. Continues to have falls. Had a bad one back in February. Has been to [facility] ER 4 times. Reports having a lot of pain in lower back especially left side. Has had some bruising. BP and cardiac status has been stable on current regimen. Depends on  for some ADLs. All other chronic medical issues are stable on current treatment regimen. Denies any issues with  Covid-19 vaccination. PMH/PSH/Allergies/Social History/medication list and most recent studies reviewed with patient. Tobacco use: No  Alcohol use: No  Reports compliance with medications and diet. Not very active physically. Uses assistive devices to get around. Reports no other new c/o. Past Medical History:   Diagnosis Date    CAD (coronary artery disease)     Hypertension     Hypokalaemia     Sacrum and coccyx fracture (HCC) 06/05/2021       Allergies   Allergen Reactions    Penicillins Swelling     Other reaction(s): Other  Reaction Type: Allergy; Reaction(s): Swelling or edema       Current Outpatient Medications on File Prior to Visit   Medication Sig Dispense Refill    hydrALAZINE (APRESOLINE) 10 mg tablet Take 1 Tablet by mouth daily. 90 Tablet 1    midodrine (PROAMATINE) 5 mg tablet Take 1 Tablet by mouth three (3) times daily (with meals). 90 Tablet 5    QUEtiapine (SEROquel) 25 mg tablet Take 1 tablet by mouth nightly 90 Tablet 0    pantoprazole (PROTONIX) 40 mg tablet Take 1 tablet by mouth once daily 90 Tablet 0    sertraline (ZOLOFT) 100 mg tablet Take 1 tablet by mouth once daily 90 Tablet 1    lidocaine (LIDODERM) 5 % Apply patch to the affected area/L posterior rib area for 12 hours a day and remove for 12 hours a day. Dx: R07.81 10 Each 5    diclofenac (Voltaren) 1 % gel Apply  to affected area four (4) times daily.  100 g 5    busPIRone (BUSPAR) 15 mg tablet Take 1 Tablet by mouth three (3) times daily. 90 Tablet 5    traZODone (DESYREL) 50 mg tablet Take 1 Tablet by mouth nightly. 90 Tablet 1    sodium chloride 1,000 mg soluble tablet Take 1 Tablet by mouth two (2) times a day. 60 Tablet 5    carbidopa-levodopa (SINEMET)  mg per tablet 1.5 tablets every 4 hours during the day, total of 5 times daily 675 Tablet 1    potassium chloride (K-DUR, KLOR-CON M20) 20 mEq tablet Take 1 Tablet by mouth two (2) times a day. 180 Tablet 1    acetaminophen (Tylenol Extra Strength) 500 mg tablet Take  by mouth every six (6) hours as needed for Pain. (Patient taking differently: Take  by mouth every six (6) hours as needed for Pain.) 90 Tablet 2    polyethylene glycol (MIRALAX) 17 gram/dose powder Take 17 g by mouth two (2) times a day. 595 g 1    glucosamine sulfate 1,000 mg cap Take 500 mg by mouth. cholecalciferol (VITAMIN D3) (1000 Units /25 mcg) tablet Take 2,000 Units by mouth daily. No current facility-administered medications on file prior to visit. Visit Vitals  Blood Pressure 128/80 (BP 1 Location: Left upper arm, BP Patient Position: Sitting, BP Cuff Size: Adult)   Pulse 87   Temperature 98 °F (36.7 °C) (Oral)   Respiration 16   Height 5' 5\" (1.651 m)   Weight 122 lb (55.3 kg)   Oxygen Saturation 99%   Body Mass Index 20.30 kg/m²             HPI  Review of Systems   Constitutional: Negative. HENT: Negative. Eyes:  Negative for blurred vision. Respiratory:  Negative for shortness of breath. Cardiovascular:  Negative for chest pain and leg swelling. Gastrointestinal:  Negative for abdominal pain. Genitourinary:  Negative for dysuria and frequency. Musculoskeletal:  Positive for back pain, falls and joint pain. Skin: Negative. Neurological:  Positive for dizziness, focal weakness (R leg) and weakness. Negative for sensory change and headaches. Psychiatric/Behavioral:  Positive for depression and memory loss.  Negative for hallucinations, substance abuse and suicidal ideas. The patient is nervous/anxious. The patient does not have insomnia. All other systems reviewed and are negative. Objective  Physical Exam  Vitals and nursing note reviewed. Constitutional:       General: She is not in acute distress. Appearance: She is well-developed. Comments: Pleasant lady  Using a walker   HENT:      Head: Normocephalic and atraumatic. Mouth/Throat:      Mouth: Mucous membranes are moist.   Eyes:      General: No scleral icterus. Conjunctiva/sclera: Conjunctivae normal.   Neck:      Thyroid: No thyromegaly. Vascular: No carotid bruit or JVD. Cardiovascular:      Rate and Rhythm: Normal rate and regular rhythm. Pulses: Normal pulses. Heart sounds: Normal heart sounds. No murmur heard. Pulmonary:      Effort: Pulmonary effort is normal. No respiratory distress. Breath sounds: Normal breath sounds. No wheezing or rales. Abdominal:      General: Bowel sounds are normal. There is no distension. Palpations: Abdomen is soft. Tenderness: There is no abdominal tenderness. There is no right CVA tenderness or left CVA tenderness. Musculoskeletal:         General: Tenderness (Mid and lower back) present. No deformity or signs of injury. Cervical back: Normal range of motion and neck supple. No rigidity. Right lower leg: No edema. Left lower leg: No edema. Comments: djd  Weak legs with muscle atrophy   Lymphadenopathy:      Cervical: No cervical adenopathy. Skin:     General: Skin is warm. Findings: No rash. Neurological:      Mental Status: She is alert and oriented to person, place, and time. Mental status is at baseline. Cranial Nerves: No cranial nerve deficit. Coordination: Coordination abnormal.      Gait: Gait abnormal.   Psychiatric:         Behavior: Behavior normal.      Comments: Chronically anxious and depressed        Assessment & Plan    ICD-10-CM ICD-9-CM    1. Frequent falls  R29.6 V15.88 MRI LUMB SPINE WO CONT      2. Acute left-sided low back pain without sciatica  M54.50 724.2 MRI LUMB SPINE WO CONT      3. Compression fracture of lumbar vertebra, unspecified lumbar vertebral level, initial encounter ([facility] [address]  S32.000A 805.4 MRI LUMB SPINE WO CONT      4. Parkinson's syndrome ([facility] [address]  G20 332.0 Continue Sinemet  Followed by neurologist       5. Gait instability  R26.81 781.2 MRI LUMB SPINE WO CONT      6. S/P kyphoplasty  Z98.890 V45.89 MRI LUMB SPINE WO CONT      7. Dysuria  R30.0 788.1 AMB POC URINALYSIS DIP STICK AUTO W/O MICRO      8. Chronic nausea  R11.0 787.02 Zofran as needed      9. Non-intractable vomiting with nausea  R11.2 787.01 ondansetron (ZOFRAN ODT) 4 mg disintegrating tablet        Orders Placed This Encounter    MRI LUMB SPINE WO CONT     Standing Status:   Future     Number of Occurrences:   1     Standing Expiration Date:   4/8/2024    AMB POC URINALYSIS DIP STICK AUTO W/O MICRO    ondansetron (ZOFRAN ODT) 4 mg disintegrating tablet     Sig: Take 1 Tablet by mouth every eight (8) hours as needed for Nausea or Vomiting. Dispense:  30 Tablet     Refill:  5     Follow-up and Dispositions    Return in about 4 weeks (around 4/5/2023). Continue with current medical management and plan  lab results and schedule of future lab studies reviewed with patient  reviewed diet, exercise and weight control  reviewed medications and side effects in detail  F/u with other MD's/ providers as scheduled  COVID-19 precautions discussed with pt  An After Visit Summary was printed and given to the patient.     Connie Dominguez DO

## 2023-05-02 ENCOUNTER — OFFICE VISIT (OUTPATIENT)
Dept: INTERNAL MEDICINE CLINIC | Age: 73
End: 2023-05-02
Payer: MEDICARE

## 2023-05-02 VITALS
OXYGEN SATURATION: 98 % | RESPIRATION RATE: 16 BRPM | HEIGHT: 65 IN | DIASTOLIC BLOOD PRESSURE: 74 MMHG | SYSTOLIC BLOOD PRESSURE: 132 MMHG | BODY MASS INDEX: 19.83 KG/M2 | HEART RATE: 85 BPM | TEMPERATURE: 98 F | WEIGHT: 119 LBS

## 2023-05-02 DIAGNOSIS — M54.41 CHRONIC RIGHT-SIDED LOW BACK PAIN WITH RIGHT-SIDED SCIATICA: Primary | ICD-10-CM

## 2023-05-02 DIAGNOSIS — Z87.81 HISTORY OF FRACTURE OF RIGHT HIP: ICD-10-CM

## 2023-05-02 DIAGNOSIS — R29.6 FREQUENT FALLS: ICD-10-CM

## 2023-05-02 DIAGNOSIS — R26.81 GAIT INSTABILITY: ICD-10-CM

## 2023-05-02 DIAGNOSIS — G89.29 CHRONIC RIGHT-SIDED LOW BACK PAIN WITH RIGHT-SIDED SCIATICA: Primary | ICD-10-CM

## 2023-05-02 DIAGNOSIS — F51.04 CHRONIC INSOMNIA: ICD-10-CM

## 2023-05-02 DIAGNOSIS — R41.3 MEMORY IMPAIRMENT: ICD-10-CM

## 2023-05-02 DIAGNOSIS — I10 ESSENTIAL HYPERTENSION: ICD-10-CM

## 2023-05-02 DIAGNOSIS — F32.A CHRONIC DEPRESSION: ICD-10-CM

## 2023-05-02 DIAGNOSIS — M79.604 RIGHT LEG PAIN: ICD-10-CM

## 2023-05-02 DIAGNOSIS — R11.0 CHRONIC NAUSEA: ICD-10-CM

## 2023-05-02 PROCEDURE — G8400 PT W/DXA NO RESULTS DOC: HCPCS | Performed by: INTERNAL MEDICINE

## 2023-05-02 PROCEDURE — G8536 NO DOC ELDER MAL SCRN: HCPCS | Performed by: INTERNAL MEDICINE

## 2023-05-02 PROCEDURE — 1101F PT FALLS ASSESS-DOCD LE1/YR: CPT | Performed by: INTERNAL MEDICINE

## 2023-05-02 PROCEDURE — G0463 HOSPITAL OUTPT CLINIC VISIT: HCPCS | Performed by: INTERNAL MEDICINE

## 2023-05-02 PROCEDURE — 99214 OFFICE O/P EST MOD 30 MIN: CPT | Performed by: INTERNAL MEDICINE

## 2023-05-02 PROCEDURE — 1090F PRES/ABSN URINE INCON ASSESS: CPT | Performed by: INTERNAL MEDICINE

## 2023-05-02 PROCEDURE — G8427 DOCREV CUR MEDS BY ELIG CLIN: HCPCS | Performed by: INTERNAL MEDICINE

## 2023-05-02 PROCEDURE — 3017F COLORECTAL CA SCREEN DOC REV: CPT | Performed by: INTERNAL MEDICINE

## 2023-05-02 PROCEDURE — G9717 DOC PT DX DEP/BP F/U NT REQ: HCPCS | Performed by: INTERNAL MEDICINE

## 2023-05-02 PROCEDURE — G8420 CALC BMI NORM PARAMETERS: HCPCS | Performed by: INTERNAL MEDICINE

## 2023-05-02 RX ORDER — ACETAMINOPHEN AND CODEINE PHOSPHATE 300; 30 MG/1; MG/1
1 TABLET ORAL EVERY 12 HOURS
Qty: 60 TABLET | Refills: 2 | Status: SHIPPED | OUTPATIENT
Start: 2023-05-02 | End: 2023-06-01

## 2023-05-02 RX ORDER — ACETAMINOPHEN 500 MG
TABLET ORAL
Qty: 60 TABLET | Status: SHIPPED | OUTPATIENT
Start: 2023-05-02

## 2023-05-05 ENCOUNTER — TELEPHONE (OUTPATIENT)
Dept: INTERNAL MEDICINE CLINIC | Age: 73
End: 2023-05-05

## 2023-05-12 ENCOUNTER — TELEPHONE (OUTPATIENT)
Age: 73
End: 2023-05-12

## 2023-05-12 DIAGNOSIS — K21.9 GASTROESOPHAGEAL REFLUX DISEASE WITHOUT ESOPHAGITIS: Primary | ICD-10-CM

## 2023-05-12 RX ORDER — PANTOPRAZOLE SODIUM 40 MG/1
40 TABLET, DELAYED RELEASE ORAL DAILY
Qty: 90 TABLET | Refills: 1 | Status: SHIPPED | OUTPATIENT
Start: 2023-05-12

## 2023-05-16 ENCOUNTER — TELEPHONE (OUTPATIENT)
Age: 73
End: 2023-05-16

## 2023-05-16 NOTE — TELEPHONE ENCOUNTER
----- Message from Chua Amari sent at 5/16/2023  3:45 PM EDT -----  Subject: Referral Request    Reason for referral request? Najma from [facility] has done   an occupational physical therapy evaluation on pt. Najma is asking for 5   therapy visits. She is asking for a verbal okay. Please call Najma at   [phone]  Provider patient wants to be referred to(if known):     Provider Phone Number(if known):     Additional Information for Provider?   ---------------------------------------------------------------------------  --------------  [address]    [phone]; OK to leave message on voicemail  ---------------------------------------------------------------------------  --------------

## 2023-05-18 ENCOUNTER — TRANSCRIBE ORDERS (OUTPATIENT)
Facility: HOSPITAL | Age: 73
End: 2023-05-18

## 2023-05-18 DIAGNOSIS — M51.36 DEGENERATION OF LUMBAR INTERVERTEBRAL DISC: ICD-10-CM

## 2023-05-18 DIAGNOSIS — M54.16 LUMBAR RADICULOPATHY: Primary | ICD-10-CM

## 2023-05-25 ENCOUNTER — HOSPITAL ENCOUNTER (OUTPATIENT)
Facility: HOSPITAL | Age: 73
Discharge: HOME | End: 2023-05-25
Payer: MEDICARE

## 2023-05-25 DIAGNOSIS — M54.16 LUMBAR RADICULOPATHY: ICD-10-CM

## 2023-05-25 DIAGNOSIS — M51.36 DEGENERATION OF LUMBAR INTERVERTEBRAL DISC: ICD-10-CM

## 2023-05-25 PROCEDURE — 72148 MRI LUMBAR SPINE W/O DYE: CPT

## 2023-06-23 ENCOUNTER — TELEPHONE (OUTPATIENT)
Age: 73
End: 2023-06-23

## 2023-06-23 NOTE — TELEPHONE ENCOUNTER
Evaluation for Occupational Therapy  done today. Calling to get orders for 8 more visits. 3 falls this month:   06/18/2023 06/19/2023 06/20/2023    [facility] was called by  yesterday. Patient was placed on antibiotics.

## 2023-06-27 DIAGNOSIS — F32.A DEPRESSION, UNSPECIFIED DEPRESSION TYPE: Primary | ICD-10-CM

## 2023-06-27 RX ORDER — POTASSIUM CHLORIDE 20 MEQ/1
TABLET, EXTENDED RELEASE ORAL
Qty: 180 TABLET | Refills: 1 | Status: SHIPPED | OUTPATIENT
Start: 2023-06-27

## 2023-06-27 RX ORDER — BUSPIRONE HYDROCHLORIDE 15 MG/1
TABLET ORAL
Qty: 90 TABLET | Refills: 0 | Status: SHIPPED | OUTPATIENT
Start: 2023-06-27

## 2023-06-27 NOTE — TELEPHONE ENCOUNTER
Requested Prescriptions     Pending Prescriptions Disp Refills    potassium chloride (KLOR-CON M) 20 MEQ extended release tablet [Pharmacy Med Name: Potassium Chloride Blanquita ER 20 MEQ Oral Tablet Extended Release] 180 tablet 0     Sig: Take 1 tablet by mouth twice daily         [address] [place] [address],  [address] [phone] Eri Jennifer [phone]  [address] [place] [address]  Phone: [phone] Fax: [phone]       Last appt 5/2/2023      Future Appointments   Date Time Provider [address] [place] [address]   8/11/2023  9:15 AM DO RAFY Hirsch BS AMB

## 2023-06-29 DIAGNOSIS — F51.04 PSYCHOPHYSIOLOGIC INSOMNIA: Primary | ICD-10-CM

## 2023-06-29 DIAGNOSIS — F32.A DEPRESSION, UNSPECIFIED DEPRESSION TYPE: ICD-10-CM

## 2023-07-01 RX ORDER — QUETIAPINE FUMARATE 25 MG/1
25 TABLET, FILM COATED ORAL 2 TIMES DAILY
Qty: 60 TABLET | Refills: 2 | Status: SHIPPED | OUTPATIENT
Start: 2023-07-01

## 2023-07-01 RX ORDER — TRAZODONE HYDROCHLORIDE 50 MG/1
50 TABLET ORAL NIGHTLY
Qty: 30 TABLET | Refills: 2 | Status: SHIPPED | OUTPATIENT
Start: 2023-07-01

## 2023-07-05 DIAGNOSIS — K21.9 GASTROESOPHAGEAL REFLUX DISEASE WITHOUT ESOPHAGITIS: ICD-10-CM

## 2023-07-05 DIAGNOSIS — F32.A DEPRESSION, UNSPECIFIED DEPRESSION TYPE: ICD-10-CM

## 2023-07-05 RX ORDER — SODIUM CHLORIDE 1 G/1
1 TABLET ORAL 2 TIMES DAILY
Qty: 180 TABLET | Refills: 1 | Status: SHIPPED | OUTPATIENT
Start: 2023-07-05

## 2023-07-05 RX ORDER — BUSPIRONE HYDROCHLORIDE 15 MG/1
15 TABLET ORAL 3 TIMES DAILY
Qty: 90 TABLET | Refills: 0 | Status: SHIPPED | OUTPATIENT
Start: 2023-07-05

## 2023-07-05 RX ORDER — PANTOPRAZOLE SODIUM 40 MG/1
40 TABLET, DELAYED RELEASE ORAL DAILY
Qty: 90 TABLET | Refills: 1 | Status: SHIPPED | OUTPATIENT
Start: 2023-07-05

## 2023-07-05 NOTE — TELEPHONE ENCOUNTER
Requested Prescriptions     Pending Prescriptions Disp Refills    sodium chloride 1 g tablet 180 tablet 1     Sig: Take 1 tablet by mouth 2 times daily         [address] [place] [address],  [address] [phone] Dominga Edward [phone]  [address] [place] [address]  Phone: [phone] Fax: [phone]       Last appt 5/2/2023      Future Appointments   Date Time Provider [address] [place] [address]   8/11/2023  9:15 AM DO RAFY Reed BS AMB

## 2023-07-07 DIAGNOSIS — G20 PARKINSON'S DISEASE (HCC): Primary | ICD-10-CM

## 2023-07-12 DIAGNOSIS — F32.A DEPRESSION, UNSPECIFIED DEPRESSION TYPE: Primary | ICD-10-CM

## 2023-07-12 NOTE — TELEPHONE ENCOUNTER
Requested Prescriptions     Pending Prescriptions Disp Refills    sertraline (ZOLOFT) 100 MG tablet 90 tablet 1     Sig: Take 1 tablet by mouth daily         [facility], [address] [phone] Kenna Spurling [phone]  [address] [place] [address]  Phone: [phone] Fax: [phone]       Last appt 5/2/2023      Future Appointments   Date Time Provider [address]   8/11/2023  9:15 AM DO RAFY Hines BS AMB

## 2023-07-13 RX ORDER — SERTRALINE HYDROCHLORIDE 100 MG/1
100 TABLET, FILM COATED ORAL DAILY
Qty: 90 TABLET | Refills: 1 | Status: SHIPPED | OUTPATIENT
Start: 2023-07-13

## 2023-07-23 DIAGNOSIS — F32.A DEPRESSION, UNSPECIFIED DEPRESSION TYPE: ICD-10-CM

## 2023-07-23 DIAGNOSIS — K21.9 GASTROESOPHAGEAL REFLUX DISEASE WITHOUT ESOPHAGITIS: ICD-10-CM

## 2023-07-24 DIAGNOSIS — F32.A DEPRESSION, UNSPECIFIED DEPRESSION TYPE: ICD-10-CM

## 2023-07-24 RX ORDER — QUETIAPINE FUMARATE 25 MG/1
25 TABLET, FILM COATED ORAL 2 TIMES DAILY
Qty: 60 TABLET | Refills: 2 | Status: SHIPPED | OUTPATIENT
Start: 2023-07-24

## 2023-07-24 RX ORDER — BUSPIRONE HYDROCHLORIDE 15 MG/1
15 TABLET ORAL 3 TIMES DAILY
Qty: 90 TABLET | Refills: 0 | Status: SHIPPED | OUTPATIENT
Start: 2023-07-24

## 2023-07-24 RX ORDER — PANTOPRAZOLE SODIUM 40 MG/1
40 TABLET, DELAYED RELEASE ORAL DAILY
Qty: 90 TABLET | Refills: 1 | Status: SHIPPED | OUTPATIENT
Start: 2023-07-24

## 2023-07-24 NOTE — TELEPHONE ENCOUNTER
From: Luis Fernando Perales  To:  Office of Violet Flynn  Sent: 7/23/2023 9:46 AM EDT  Subject: Medication Renewal Request    Refills have been requested for the following medications:     QUEtiapine (SEROQUEL) 25 MG tablet HUAN Blank NP]    Preferred pharmacy: [address] [place] [address], [address] [place] [address] [phone] - F [phone]      Medication renewals requested in this message routed separately:     pantoprazole (PROTONIX) 40 MG tablet [Dr. Elda Nino, DO]     busPIRone (BUSPAR) 15 MG tablet [Dr. Elda Nino, DO]

## 2023-08-08 ENCOUNTER — TELEPHONE (OUTPATIENT)
Age: 73
End: 2023-08-08

## 2023-08-08 NOTE — TELEPHONE ENCOUNTER
[address] [facility] called needing verbal orders to discharge patient from occupational therapy. They said that she has reached the maximum rehab potential with occupational therapy.  The call back number is [phone]

## 2023-08-11 ENCOUNTER — OFFICE VISIT (OUTPATIENT)
Age: 73
End: 2023-08-11
Payer: MEDICARE

## 2023-08-11 VITALS
HEIGHT: 65 IN | RESPIRATION RATE: 16 BRPM | HEART RATE: 97 BPM | BODY MASS INDEX: 20.16 KG/M2 | WEIGHT: 121 LBS | TEMPERATURE: 98 F | DIASTOLIC BLOOD PRESSURE: 80 MMHG | SYSTOLIC BLOOD PRESSURE: 122 MMHG | OXYGEN SATURATION: 98 %

## 2023-08-11 DIAGNOSIS — G89.29 CHRONIC PAIN OF RIGHT LOWER EXTREMITY: ICD-10-CM

## 2023-08-11 DIAGNOSIS — I10 ESSENTIAL HYPERTENSION: Primary | ICD-10-CM

## 2023-08-11 DIAGNOSIS — E44.0 MODERATE PROTEIN-CALORIE MALNUTRITION (HCC): ICD-10-CM

## 2023-08-11 DIAGNOSIS — M79.604 CHRONIC PAIN OF RIGHT LOWER EXTREMITY: ICD-10-CM

## 2023-08-11 DIAGNOSIS — G20 PARKINSON'S SYNDROME (HCC): ICD-10-CM

## 2023-08-11 DIAGNOSIS — R53.1 WEAKNESS GENERALIZED: ICD-10-CM

## 2023-08-11 DIAGNOSIS — F41.9 ANXIETY: ICD-10-CM

## 2023-08-11 DIAGNOSIS — K21.9 GASTROESOPHAGEAL REFLUX DISEASE WITHOUT ESOPHAGITIS: ICD-10-CM

## 2023-08-11 PROBLEM — E46 PROTEIN CALORIE MALNUTRITION (HCC): Status: ACTIVE | Noted: 2023-08-11

## 2023-08-11 PROCEDURE — 99214 OFFICE O/P EST MOD 30 MIN: CPT | Performed by: INTERNAL MEDICINE

## 2023-08-11 PROCEDURE — G8400 PT W/DXA NO RESULTS DOC: HCPCS | Performed by: INTERNAL MEDICINE

## 2023-08-11 PROCEDURE — 3074F SYST BP LT 130 MM HG: CPT | Performed by: INTERNAL MEDICINE

## 2023-08-11 PROCEDURE — 1123F ACP DISCUSS/DSCN MKR DOCD: CPT | Performed by: INTERNAL MEDICINE

## 2023-08-11 PROCEDURE — G8420 CALC BMI NORM PARAMETERS: HCPCS | Performed by: INTERNAL MEDICINE

## 2023-08-11 PROCEDURE — G8427 DOCREV CUR MEDS BY ELIG CLIN: HCPCS | Performed by: INTERNAL MEDICINE

## 2023-08-11 PROCEDURE — 3017F COLORECTAL CA SCREEN DOC REV: CPT | Performed by: INTERNAL MEDICINE

## 2023-08-11 PROCEDURE — 1036F TOBACCO NON-USER: CPT | Performed by: INTERNAL MEDICINE

## 2023-08-11 PROCEDURE — 3079F DIAST BP 80-89 MM HG: CPT | Performed by: INTERNAL MEDICINE

## 2023-08-11 PROCEDURE — 1090F PRES/ABSN URINE INCON ASSESS: CPT | Performed by: INTERNAL MEDICINE

## 2023-08-11 SDOH — ECONOMIC STABILITY: FOOD INSECURITY: WITHIN THE PAST 12 MONTHS, YOU WORRIED THAT YOUR FOOD WOULD RUN OUT BEFORE YOU GOT MONEY TO BUY MORE.: NEVER TRUE

## 2023-08-11 SDOH — ECONOMIC STABILITY: FOOD INSECURITY: WITHIN THE PAST 12 MONTHS, THE FOOD YOU BOUGHT JUST DIDN'T LAST AND YOU DIDN'T HAVE MONEY TO GET MORE.: NEVER TRUE

## 2023-08-11 SDOH — ECONOMIC STABILITY: INCOME INSECURITY: HOW HARD IS IT FOR YOU TO PAY FOR THE VERY BASICS LIKE FOOD, HOUSING, MEDICAL CARE, AND HEATING?: NOT VERY HARD

## 2023-08-11 SDOH — ECONOMIC STABILITY: HOUSING INSECURITY
IN THE LAST 12 MONTHS, WAS THERE A TIME WHEN YOU DID NOT HAVE A STEADY PLACE TO SLEEP OR SLEPT IN A SHELTER (INCLUDING NOW)?: NO

## 2023-08-11 ASSESSMENT — PATIENT HEALTH QUESTIONNAIRE - PHQ9
6. FEELING BAD ABOUT YOURSELF - OR THAT YOU ARE A FAILURE OR HAVE LET YOURSELF OR YOUR FAMILY DOWN: 0
SUM OF ALL RESPONSES TO PHQ QUESTIONS 1-9: 0
2. FEELING DOWN, DEPRESSED OR HOPELESS: 0
3. TROUBLE FALLING OR STAYING ASLEEP: 0
8. MOVING OR SPEAKING SO SLOWLY THAT OTHER PEOPLE COULD HAVE NOTICED. OR THE OPPOSITE, BEING SO FIGETY OR RESTLESS THAT YOU HAVE BEEN MOVING AROUND A LOT MORE THAN USUAL: 0
1. LITTLE INTEREST OR PLEASURE IN DOING THINGS: 0
SUM OF ALL RESPONSES TO PHQ QUESTIONS 1-9: 0
SUM OF ALL RESPONSES TO PHQ QUESTIONS 1-9: 0
10. IF YOU CHECKED OFF ANY PROBLEMS, HOW DIFFICULT HAVE THESE PROBLEMS MADE IT FOR YOU TO DO YOUR WORK, TAKE CARE OF THINGS AT HOME, OR GET ALONG WITH OTHER PEOPLE: 0
4. FEELING TIRED OR HAVING LITTLE ENERGY: 0
SUM OF ALL RESPONSES TO PHQ9 QUESTIONS 1 & 2: 0
SUM OF ALL RESPONSES TO PHQ QUESTIONS 1-9: 0
5. POOR APPETITE OR OVEREATING: 0
7. TROUBLE CONCENTRATING ON THINGS, SUCH AS READING THE NEWSPAPER OR WATCHING TELEVISION: 0
9. THOUGHTS THAT YOU WOULD BE BETTER OFF DEAD, OR OF HURTING YOURSELF: 0

## 2023-08-11 ASSESSMENT — ENCOUNTER SYMPTOMS
EYES NEGATIVE: 1
ALLERGIC/IMMUNOLOGIC NEGATIVE: 1
ABDOMINAL PAIN: 0
RESPIRATORY NEGATIVE: 1
GASTROINTESTINAL NEGATIVE: 1
SHORTNESS OF BREATH: 0
BACK PAIN: 1

## 2023-08-11 ASSESSMENT — ANXIETY QUESTIONNAIRES
4. TROUBLE RELAXING: 0
GAD7 TOTAL SCORE: 0
1. FEELING NERVOUS, ANXIOUS, OR ON EDGE: 0
5. BEING SO RESTLESS THAT IT IS HARD TO SIT STILL: 0
IF YOU CHECKED OFF ANY PROBLEMS ON THIS QUESTIONNAIRE, HOW DIFFICULT HAVE THESE PROBLEMS MADE IT FOR YOU TO DO YOUR WORK, TAKE CARE OF THINGS AT HOME, OR GET ALONG WITH OTHER PEOPLE: NOT DIFFICULT AT ALL
7. FEELING AFRAID AS IF SOMETHING AWFUL MIGHT HAPPEN: 0
3. WORRYING TOO MUCH ABOUT DIFFERENT THINGS: 0
2. NOT BEING ABLE TO STOP OR CONTROL WORRYING: 0
6. BECOMING EASILY ANNOYED OR IRRITABLE: 0

## 2023-08-24 ENCOUNTER — TELEPHONE (OUTPATIENT)
Age: 73
End: 2023-08-24

## 2023-08-24 NOTE — TELEPHONE ENCOUNTER
Incoming call from Dorcas at [facility], wanted to verify patient's prescriptions. Went over sig for buspirone.  Clarifications given

## 2023-09-09 DIAGNOSIS — G20 PARKINSON'S DISEASE (HCC): ICD-10-CM

## 2023-09-11 LAB — HBA1C MFR BLD HPLC: 5.4 %

## 2023-09-12 ENCOUNTER — TELEPHONE (OUTPATIENT)
Age: 73
End: 2023-09-12

## 2023-09-12 ENCOUNTER — CLINICAL DOCUMENTATION (OUTPATIENT)
Age: 73
End: 2023-09-12

## 2023-09-12 NOTE — PROGRESS NOTES
Pt had a heart attack and is at [facility]. They are trying to decide if they are going to put a pacemaker on her or a stent.

## 2023-09-12 NOTE — TELEPHONE ENCOUNTER
Pt spouse calling to discuss pt having 100% blockage and being hospitalized for heart attack at [facility]. Doctor thinks she had the heart attack on Sunday.

## 2023-09-14 NOTE — TELEPHONE ENCOUNTER
Call placed to [facility], no answer.  Left VM

## 2023-10-05 ENCOUNTER — OFFICE VISIT (OUTPATIENT)
Age: 73
End: 2023-10-05
Payer: MEDICARE

## 2023-10-05 VITALS
BODY MASS INDEX: 20.99 KG/M2 | WEIGHT: 126 LBS | RESPIRATION RATE: 18 BRPM | HEART RATE: 77 BPM | DIASTOLIC BLOOD PRESSURE: 60 MMHG | HEIGHT: 65 IN | TEMPERATURE: 98 F | SYSTOLIC BLOOD PRESSURE: 117 MMHG | OXYGEN SATURATION: 98 %

## 2023-10-05 DIAGNOSIS — G20.A2 PARKINSON'S DISEASE WITH FLUCTUATING MANIFESTATIONS, UNSPECIFIED WHETHER DYSKINESIA PRESENT: ICD-10-CM

## 2023-10-05 DIAGNOSIS — R29.6 REPEATED FALLS: ICD-10-CM

## 2023-10-05 DIAGNOSIS — I25.10 CORONARY ARTERY DISEASE INVOLVING NATIVE CORONARY ARTERY OF NATIVE HEART WITHOUT ANGINA PECTORIS: ICD-10-CM

## 2023-10-05 DIAGNOSIS — I10 ESSENTIAL (PRIMARY) HYPERTENSION: ICD-10-CM

## 2023-10-05 DIAGNOSIS — Z09 HOSPITAL DISCHARGE FOLLOW-UP: Primary | ICD-10-CM

## 2023-10-05 DIAGNOSIS — Z95.828 HISTORY OF INTRAVASCULAR STENT PLACEMENT: ICD-10-CM

## 2023-10-05 PROBLEM — G20.A1 PARKINSON'S DISEASE: Status: ACTIVE | Noted: 2023-10-05

## 2023-10-05 PROCEDURE — 1090F PRES/ABSN URINE INCON ASSESS: CPT | Performed by: INTERNAL MEDICINE

## 2023-10-05 PROCEDURE — 3074F SYST BP LT 130 MM HG: CPT | Performed by: INTERNAL MEDICINE

## 2023-10-05 PROCEDURE — 3017F COLORECTAL CA SCREEN DOC REV: CPT | Performed by: INTERNAL MEDICINE

## 2023-10-05 PROCEDURE — G8427 DOCREV CUR MEDS BY ELIG CLIN: HCPCS | Performed by: INTERNAL MEDICINE

## 2023-10-05 PROCEDURE — 3078F DIAST BP <80 MM HG: CPT | Performed by: INTERNAL MEDICINE

## 2023-10-05 PROCEDURE — 1036F TOBACCO NON-USER: CPT | Performed by: INTERNAL MEDICINE

## 2023-10-05 PROCEDURE — G8420 CALC BMI NORM PARAMETERS: HCPCS | Performed by: INTERNAL MEDICINE

## 2023-10-05 PROCEDURE — G8400 PT W/DXA NO RESULTS DOC: HCPCS | Performed by: INTERNAL MEDICINE

## 2023-10-05 PROCEDURE — 1123F ACP DISCUSS/DSCN MKR DOCD: CPT | Performed by: INTERNAL MEDICINE

## 2023-10-05 PROCEDURE — 99214 OFFICE O/P EST MOD 30 MIN: CPT | Performed by: INTERNAL MEDICINE

## 2023-10-05 PROCEDURE — G8484 FLU IMMUNIZE NO ADMIN: HCPCS | Performed by: INTERNAL MEDICINE

## 2023-10-05 RX ORDER — HYDROGEN PEROXIDE 2.65 ML/100ML
81 LIQUID ORAL; TOPICAL DAILY
COMMUNITY
Start: 2023-09-30

## 2023-10-05 RX ORDER — ATORVASTATIN CALCIUM 40 MG/1
40 TABLET, FILM COATED ORAL
COMMUNITY
Start: 2023-09-20

## 2023-10-05 RX ORDER — CLOPIDOGREL BISULFATE 75 MG/1
75 TABLET ORAL DAILY
COMMUNITY
Start: 2023-09-20

## 2023-10-05 ASSESSMENT — ENCOUNTER SYMPTOMS
COUGH: 0
SHORTNESS OF BREATH: 0
CHEST TIGHTNESS: 0
GASTROINTESTINAL NEGATIVE: 1

## 2023-10-05 NOTE — PROGRESS NOTES
1. Have you been to the ER, urgent care clinic since your last visit? Hospitalized since your last visit? [facility]    2. Have you seen or consulted any other health care providers outside of the [address] [place] [address] Avenue since your last visit? Include any pap smears or colon screening.    no
about 2 months (around 12/5/2023) for follow up if symptoms persist, follow up for routine visit or before if needed.      HUAN Alvarez - NP

## 2023-10-27 ENCOUNTER — TELEPHONE (OUTPATIENT)
Age: 73
End: 2023-10-27

## 2023-10-27 NOTE — TELEPHONE ENCOUNTER
Called in regards to mutual Pt they see 5 times a week for a fax that sent on 10/12 from [facility]

## 2023-10-27 NOTE — TELEPHONE ENCOUNTER
Call placed to [facility], informed Christiana Guest forms were never received. She will refax them to office today.  Confirmed office fax #

## 2023-11-06 DIAGNOSIS — F32.A DEPRESSION, UNSPECIFIED DEPRESSION TYPE: ICD-10-CM

## 2023-11-06 NOTE — TELEPHONE ENCOUNTER
From: Sisi Bryant  To:  Office of [facility]  Sent: 11/4/2023 6:47 AM EDT  Subject: Medication Renewal Request    Refills have been requested for the following medications:     busPIRone (BUSPAR) 15 MG tablet HUAN Gordillo - NP]    Preferred pharmacy: [facility],  [address] [phone] - F [phone]

## 2023-11-07 RX ORDER — BUSPIRONE HYDROCHLORIDE 15 MG/1
15 TABLET ORAL 3 TIMES DAILY
Qty: 90 TABLET | Refills: 1 | Status: SHIPPED | OUTPATIENT
Start: 2023-11-07

## 2023-11-10 ENCOUNTER — OFFICE VISIT (OUTPATIENT)
Age: 73
End: 2023-11-10
Payer: MEDICARE

## 2023-11-10 VITALS
SYSTOLIC BLOOD PRESSURE: 118 MMHG | BODY MASS INDEX: 19.83 KG/M2 | RESPIRATION RATE: 16 BRPM | OXYGEN SATURATION: 99 % | DIASTOLIC BLOOD PRESSURE: 68 MMHG | HEIGHT: 65 IN | TEMPERATURE: 98 F | WEIGHT: 119 LBS | HEART RATE: 77 BPM

## 2023-11-10 DIAGNOSIS — I10 ESSENTIAL (PRIMARY) HYPERTENSION: ICD-10-CM

## 2023-11-10 DIAGNOSIS — R26.81 GAIT INSTABILITY: ICD-10-CM

## 2023-11-10 DIAGNOSIS — G20.A2 PARKINSON'S DISEASE WITH FLUCTUATING MANIFESTATIONS, UNSPECIFIED WHETHER DYSKINESIA PRESENT: ICD-10-CM

## 2023-11-10 DIAGNOSIS — F41.9 ANXIETY: ICD-10-CM

## 2023-11-10 DIAGNOSIS — S22.31XA CLOSED FRACTURE OF ONE RIB OF RIGHT SIDE, INITIAL ENCOUNTER: Primary | ICD-10-CM

## 2023-11-10 DIAGNOSIS — F51.04 PSYCHOPHYSIOLOGIC INSOMNIA: ICD-10-CM

## 2023-11-10 PROCEDURE — 3017F COLORECTAL CA SCREEN DOC REV: CPT | Performed by: INTERNAL MEDICINE

## 2023-11-10 PROCEDURE — 99214 OFFICE O/P EST MOD 30 MIN: CPT | Performed by: INTERNAL MEDICINE

## 2023-11-10 PROCEDURE — G8420 CALC BMI NORM PARAMETERS: HCPCS | Performed by: INTERNAL MEDICINE

## 2023-11-10 PROCEDURE — G8400 PT W/DXA NO RESULTS DOC: HCPCS | Performed by: INTERNAL MEDICINE

## 2023-11-10 PROCEDURE — 1123F ACP DISCUSS/DSCN MKR DOCD: CPT | Performed by: INTERNAL MEDICINE

## 2023-11-10 PROCEDURE — 3074F SYST BP LT 130 MM HG: CPT | Performed by: INTERNAL MEDICINE

## 2023-11-10 PROCEDURE — 1090F PRES/ABSN URINE INCON ASSESS: CPT | Performed by: INTERNAL MEDICINE

## 2023-11-10 PROCEDURE — G8484 FLU IMMUNIZE NO ADMIN: HCPCS | Performed by: INTERNAL MEDICINE

## 2023-11-10 PROCEDURE — 1036F TOBACCO NON-USER: CPT | Performed by: INTERNAL MEDICINE

## 2023-11-10 PROCEDURE — 3078F DIAST BP <80 MM HG: CPT | Performed by: INTERNAL MEDICINE

## 2023-11-10 PROCEDURE — G8427 DOCREV CUR MEDS BY ELIG CLIN: HCPCS | Performed by: INTERNAL MEDICINE

## 2023-11-10 RX ORDER — POTASSIUM CHLORIDE 20 MEQ/1
20 TABLET, EXTENDED RELEASE ORAL 2 TIMES DAILY
Qty: 180 TABLET | Refills: 1 | Status: SHIPPED | OUTPATIENT
Start: 2023-11-10

## 2023-11-10 RX ORDER — METOPROLOL SUCCINATE 25 MG/1
25 TABLET, EXTENDED RELEASE ORAL DAILY
Qty: 90 TABLET | Refills: 1 | Status: SHIPPED | OUTPATIENT
Start: 2023-11-10

## 2023-11-10 RX ORDER — ATORVASTATIN CALCIUM 40 MG/1
40 TABLET, FILM COATED ORAL DAILY
Qty: 90 TABLET | Refills: 1 | Status: SHIPPED | OUTPATIENT
Start: 2023-11-10

## 2023-11-10 RX ORDER — CLOPIDOGREL BISULFATE 75 MG/1
75 TABLET ORAL DAILY
Qty: 90 TABLET | Refills: 1 | Status: SHIPPED | OUTPATIENT
Start: 2023-11-10

## 2023-11-10 RX ORDER — ONDANSETRON 4 MG/1
4 TABLET, ORALLY DISINTEGRATING ORAL EVERY 8 HOURS PRN
Qty: 30 TABLET | Refills: 5 | Status: SHIPPED | OUTPATIENT
Start: 2023-11-10

## 2023-11-10 RX ORDER — METOPROLOL SUCCINATE 25 MG/1
25 TABLET, EXTENDED RELEASE ORAL DAILY
COMMUNITY
End: 2023-11-10 | Stop reason: SDUPTHER

## 2023-11-10 SDOH — ECONOMIC STABILITY: FOOD INSECURITY: WITHIN THE PAST 12 MONTHS, THE FOOD YOU BOUGHT JUST DIDN'T LAST AND YOU DIDN'T HAVE MONEY TO GET MORE.: NEVER TRUE

## 2023-11-10 SDOH — ECONOMIC STABILITY: FOOD INSECURITY: WITHIN THE PAST 12 MONTHS, YOU WORRIED THAT YOUR FOOD WOULD RUN OUT BEFORE YOU GOT MONEY TO BUY MORE.: NEVER TRUE

## 2023-11-10 SDOH — ECONOMIC STABILITY: INCOME INSECURITY: HOW HARD IS IT FOR YOU TO PAY FOR THE VERY BASICS LIKE FOOD, HOUSING, MEDICAL CARE, AND HEATING?: NOT VERY HARD

## 2023-11-10 ASSESSMENT — ENCOUNTER SYMPTOMS
ABDOMINAL PAIN: 0
NAUSEA: 1
SHORTNESS OF BREATH: 0
ALLERGIC/IMMUNOLOGIC NEGATIVE: 1
RESPIRATORY NEGATIVE: 1
EYES NEGATIVE: 1

## 2023-11-10 ASSESSMENT — COLUMBIA-SUICIDE SEVERITY RATING SCALE - C-SSRS
3. HAVE YOU BEEN THINKING ABOUT HOW YOU MIGHT KILL YOURSELF?: NO
5. HAVE YOU STARTED TO WORK OUT OR WORKED OUT THE DETAILS OF HOW TO KILL YOURSELF? DO YOU INTEND TO CARRY OUT THIS PLAN?: NO
4. HAVE YOU HAD THESE THOUGHTS AND HAD SOME INTENTION OF ACTING ON THEM?: NO
7. DID THIS OCCUR IN THE LAST THREE MONTHS: NO

## 2023-11-10 ASSESSMENT — PATIENT HEALTH QUESTIONNAIRE - PHQ9
10. IF YOU CHECKED OFF ANY PROBLEMS, HOW DIFFICULT HAVE THESE PROBLEMS MADE IT FOR YOU TO DO YOUR WORK, TAKE CARE OF THINGS AT HOME, OR GET ALONG WITH OTHER PEOPLE: 0
4. FEELING TIRED OR HAVING LITTLE ENERGY: 0
SUM OF ALL RESPONSES TO PHQ QUESTIONS 1-9: 0
5. POOR APPETITE OR OVEREATING: 0
SUM OF ALL RESPONSES TO PHQ QUESTIONS 1-9: 0
SUM OF ALL RESPONSES TO PHQ QUESTIONS 1-9: 0
2. FEELING DOWN, DEPRESSED OR HOPELESS: 0
SUM OF ALL RESPONSES TO PHQ QUESTIONS 1-9: 0
6. FEELING BAD ABOUT YOURSELF - OR THAT YOU ARE A FAILURE OR HAVE LET YOURSELF OR YOUR FAMILY DOWN: 0
7. TROUBLE CONCENTRATING ON THINGS, SUCH AS READING THE NEWSPAPER OR WATCHING TELEVISION: 0
3. TROUBLE FALLING OR STAYING ASLEEP: 0
9. THOUGHTS THAT YOU WOULD BE BETTER OFF DEAD, OR OF HURTING YOURSELF: 0
8. MOVING OR SPEAKING SO SLOWLY THAT OTHER PEOPLE COULD HAVE NOTICED. OR THE OPPOSITE, BEING SO FIGETY OR RESTLESS THAT YOU HAVE BEEN MOVING AROUND A LOT MORE THAN USUAL: 0

## 2023-11-10 ASSESSMENT — ANXIETY QUESTIONNAIRES
3. WORRYING TOO MUCH ABOUT DIFFERENT THINGS: 0
5. BEING SO RESTLESS THAT IT IS HARD TO SIT STILL: 0
1. FEELING NERVOUS, ANXIOUS, OR ON EDGE: 0
6. BECOMING EASILY ANNOYED OR IRRITABLE: 0
4. TROUBLE RELAXING: 0
IF YOU CHECKED OFF ANY PROBLEMS ON THIS QUESTIONNAIRE, HOW DIFFICULT HAVE THESE PROBLEMS MADE IT FOR YOU TO DO YOUR WORK, TAKE CARE OF THINGS AT HOME, OR GET ALONG WITH OTHER PEOPLE: NOT DIFFICULT AT ALL
2. NOT BEING ABLE TO STOP OR CONTROL WORRYING: 0
GAD7 TOTAL SCORE: 0
7. FEELING AFRAID AS IF SOMETHING AWFUL MIGHT HAPPEN: 0

## 2023-11-10 NOTE — PROGRESS NOTES
1. \"Have you been to the ER, urgent care clinic since your last visit? Hospitalized since your last visit? \" No    2. \"Have you seen or consulted any other health care providers outside of the [address] [place] [address] since your last visit? \" No    3. For patients aged 43-73: Has the patient had a colonoscopy / FIT/ Cologuard? Recommendation: Colonoscopy every 10y or annual FIT test from 50-75 or every 3 year stool DNA based test with consideration of ongoing screening from 76-85. If the patient is female:    4. For patients aged 43-66: Has the patient had a mammogram within the past 2 years? No    5. For patients aged 21-65: Has the patient had a pap smear?  No
(KLOR-CON M) 20 MEQ extended release tablet     Sig: Take 1 tablet by mouth 2 times daily     Dispense:  180 tablet     Refill:  1    ondansetron (ZOFRAN-ODT) 4 MG disintegrating tablet     Sig: Take 1 tablet by mouth every 8 hours as needed for Nausea     Dispense:  30 tablet     Refill:  5        Return in about 6 months (around 5/10/2024). All chronic medical problems are stable  Continue with current medical management and plan  lab results and schedule of future lab studies reviewed with patient  reviewed diet, exercise and weight control  reviewed medications and side effects in detail  F/u with other MD's/ providers as scheduled  COVID-19 precautions discussed with pt  An After Visit Summary was printed and given to the patient.       Lenin Suresh, DO

## 2023-11-13 RX ORDER — SODIUM CHLORIDE 1 G/1
1 TABLET ORAL 2 TIMES DAILY
Qty: 180 TABLET | Refills: 1 | Status: SHIPPED | OUTPATIENT
Start: 2023-11-13

## 2023-11-16 ENCOUNTER — CLINICAL DOCUMENTATION (OUTPATIENT)
Age: 73
End: 2023-11-16

## 2023-11-16 NOTE — PROGRESS NOTES
Patient was recently seen at [facility] and given a RX for Tramadol. Modesto Palomo RN at [facility] called and informed me. She stated that the patient came to them today with a bottle of Tramadol 50 mg to take up to TID as needed for pain and Modesto Palomo RN stated she needs a verbal order for them to continue this. PCP out of the office this week, so I went to the covering provider, and she stated as long as the patient is prescribed this, and documentation was provided to prove such, she said it was ok, I called Amparo Rodriguez back and gave her the verbal ok, she said she would place the verbal order under Char since Jose Benítez is out. ED discharge report was faxed to us from Amparo Rodriguez, and on the report it is documented that patient was prescribed Tramadol HCL 50 mg orally three times a day as needed for pain dispense 15 tablets with 0 refills.

## 2023-11-20 DIAGNOSIS — F41.9 ANXIETY: ICD-10-CM

## 2023-11-20 DIAGNOSIS — I25.10 CORONARY ARTERY DISEASE INVOLVING NATIVE CORONARY ARTERY OF NATIVE HEART WITHOUT ANGINA PECTORIS: Primary | ICD-10-CM

## 2023-11-20 DIAGNOSIS — F32.A DEPRESSION, UNSPECIFIED DEPRESSION TYPE: ICD-10-CM

## 2023-11-21 DIAGNOSIS — F51.04 PSYCHOPHYSIOLOGIC INSOMNIA: ICD-10-CM

## 2023-11-21 DIAGNOSIS — G89.29 CHRONIC PAIN OF RIGHT LOWER EXTREMITY: Primary | ICD-10-CM

## 2023-11-21 DIAGNOSIS — M79.604 CHRONIC PAIN OF RIGHT LOWER EXTREMITY: Primary | ICD-10-CM

## 2023-11-21 RX ORDER — CLOPIDOGREL BISULFATE 75 MG/1
75 TABLET ORAL DAILY
Qty: 90 TABLET | Refills: 1 | Status: SHIPPED | OUTPATIENT
Start: 2023-11-21

## 2023-11-21 RX ORDER — BUSPIRONE HYDROCHLORIDE 15 MG/1
15 TABLET ORAL 3 TIMES DAILY
Qty: 90 TABLET | Refills: 1 | Status: SHIPPED | OUTPATIENT
Start: 2023-11-21

## 2023-11-21 RX ORDER — ATORVASTATIN CALCIUM 40 MG/1
40 TABLET, FILM COATED ORAL DAILY
Qty: 90 TABLET | Refills: 1 | Status: SHIPPED | OUTPATIENT
Start: 2023-11-21

## 2023-11-21 RX ORDER — TRAZODONE HYDROCHLORIDE 50 MG/1
50 TABLET ORAL NIGHTLY
Qty: 30 TABLET | Refills: 5 | Status: SHIPPED | OUTPATIENT
Start: 2023-11-21

## 2023-11-21 NOTE — TELEPHONE ENCOUNTER
From: Mac Monahan  To:  Office of Catherine Flynn  Sent: 11/20/2023 9:47 PM EST  Subject: Medication Renewal Request    Refills have been requested for the following medications:     traZODone (DESYREL) 50 MG tablet HUAN De La Paz - NP]    Preferred pharmacy: [facility], [facility] [phone] - F [phone]      Medication renewals requested in this message routed separately:     busPIRone (BUSPAR) 15 MG tablet [Dr. Rakesh Panchal, DO]     atorvastatin (LIPITOR) 40 MG tablet [Dr. Rakesh Panchal, DO]     clopidogrel (PLAVIX) 75 MG tablet [Dr. Rakesh Panchal, DO]

## 2023-11-21 NOTE — TELEPHONE ENCOUNTER
tramadol 50mg Tab     Last OV: 11/10/2023  Next OV: 02/14/2024    [facility], [address] [phone] - F [phone]

## 2023-11-21 NOTE — TELEPHONE ENCOUNTER
Last appt 11/10/2023      Next Apt:     Future Appointments   Date Time Provider [address]   2/14/2024  9:00 AM DO RAFY Stevenson BS AMB

## 2023-11-22 DIAGNOSIS — M79.18 RIGHT BUTTOCK PAIN: Primary | ICD-10-CM

## 2023-11-22 RX ORDER — ACETAMINOPHEN AND CODEINE PHOSPHATE 300; 30 MG/1; MG/1
TABLET ORAL
Qty: 60 TABLET | Refills: 0 | Status: SHIPPED | OUTPATIENT
Start: 2023-11-22 | End: 2023-12-22

## 2023-11-22 RX ORDER — TRAMADOL HYDROCHLORIDE 50 MG/1
50 TABLET ORAL EVERY 6 HOURS PRN
Qty: 12 TABLET | Refills: 0 | Status: SHIPPED | OUTPATIENT
Start: 2023-11-22 | End: 2023-11-25

## 2023-11-22 NOTE — TELEPHONE ENCOUNTER
Last appt 11/10/2023      Next Apt:     Future Appointments   Date Time Provider [address]   2/14/2024  9:00 AM DO RAFY Rosario BS AMB

## 2023-11-22 NOTE — TELEPHONE ENCOUNTER
Tramadol HCL    Last OV: 11/10/2023  Next OV: 02/14/2024    [facility], [address] [phone] - F [phone]

## 2024-01-06 ENCOUNTER — PATIENT MESSAGE (OUTPATIENT)
Age: 74
End: 2024-01-06

## 2024-01-08 ENCOUNTER — TELEPHONE (OUTPATIENT)
Age: 74
End: 2024-01-08

## 2024-01-08 NOTE — TELEPHONE ENCOUNTER
Forms must be completed within 30 days of examining patient, awaiting patient's follow up.   Message sent to León via [employer].

## 2024-01-08 NOTE — TELEPHONE ENCOUNTER
Call placed to patient, scheduled patient sooner appointment. Patient's  requesting a letter to state patient needs 24/7 care due to ongoing worsening conditions.   León voiced understanding and knows forms will be completed next Tuesday for PPOC forms.     From: Frannie Law  Sent: 1/8/2024  2:08 PM EST  To: [facility]  Subject: Frannie Law     Can she be seen any sooner? I would like to have her placed within this week.

## 2024-01-08 NOTE — TELEPHONE ENCOUNTER
Pt was seen for evaluation this morning.  has found an assistant living place for pt to go to as soon as next week. Please follow up on paperwork that was dropped off.     Please return call if you have any questions at [phone]

## 2024-01-09 ENCOUNTER — TELEPHONE (OUTPATIENT)
Age: 74
End: 2024-01-09

## 2024-01-09 DIAGNOSIS — G20.A1 PARKINSON'S DISEASE: ICD-10-CM

## 2024-01-09 NOTE — TELEPHONE ENCOUNTER
PRESCRIPTION NEEDS UPDATED DIRECTIONS    Pt is out of medication and won't give them an emergency refill; Please change Directions to 1 tablet every 3 hours per Pt's Emergency Contact        LOV: 11/10/2023  NOV: 01/16/2024     Medication: carbidopa-levodopa (SINEMET)  MG per tablet   Quantity: 90 Day Supply     Pharmacy: [facility] [address] [place] [address] [phone] -  [phone] [[id number]]

## 2024-01-09 NOTE — TELEPHONE ENCOUNTER
Pt is out of medication and won't give them an emergency refill; Please change Directions to 1 tablet every 3 hours per Pt's Emergency Contact      LOV: 11/10/2023  NOV: 01/16/2024    Medication: carbidopa-levodopa (SINEMET)  MG per tablet   Quantity: 90 Day Supply    Pharmacy: [facility] [address] [place] [address] [phone] -  [phone] [[id number]]

## 2024-03-28 DIAGNOSIS — F32.A DEPRESSION, UNSPECIFIED DEPRESSION TYPE: ICD-10-CM

## 2024-03-28 RX ORDER — BUSPIRONE HYDROCHLORIDE 15 MG/1
15 TABLET ORAL 3 TIMES DAILY
Qty: 90 TABLET | Refills: 0 | Status: SHIPPED | OUTPATIENT
Start: 2024-03-28

## 2024-04-02 NOTE — ED NOTES
Medication:   Requested Prescriptions     Pending Prescriptions Disp Refills    triamterene-hydroCHLOROthiazide (DYAZIDE) 37.5-25 MG per capsule [Pharmacy Med Name: Triamterene-HCTZ Oral Capsule 37.5-25 MG] 90 capsule 0     Sig: TAKE 1 CAPSULE BY MOUTH EVERY DAY        Last Filled:  7/10/23    Patient Phone Number: [phone] (home)     Last appt: 3/27/2024   Next appt: 9/10/2024    Last OARRS:       10/9/2017     5:34 PM   RX Monitoring   Attestation The Prescription Monitoring Report for this patient was reviewed today.   Periodic Controlled Substance Monitoring No signs of potential drug abuse or diversion identified.          Pt given discharge instructions. Questions answered and pt states understanding, no distress noted, pt ambulated out of unit.

## 2024-08-21 ENCOUNTER — OFFICE VISIT (OUTPATIENT)
Age: 74
End: 2024-08-21
Payer: MEDICARE

## 2024-08-21 VITALS
WEIGHT: 92 LBS | DIASTOLIC BLOOD PRESSURE: 62 MMHG | HEART RATE: 96 BPM | BODY MASS INDEX: 15.33 KG/M2 | SYSTOLIC BLOOD PRESSURE: 100 MMHG | TEMPERATURE: 97.8 F | OXYGEN SATURATION: 92 % | HEIGHT: 65 IN

## 2024-08-21 DIAGNOSIS — G20.B1 PARKINSON'S DISEASE WITH DYSKINESIA, UNSPECIFIED WHETHER MANIFESTATIONS FLUCTUATE (HCC): Primary | ICD-10-CM

## 2024-08-21 DIAGNOSIS — I10 ESSENTIAL (PRIMARY) HYPERTENSION: ICD-10-CM

## 2024-08-21 DIAGNOSIS — E44.0 MODERATE PROTEIN-CALORIE MALNUTRITION (HCC): ICD-10-CM

## 2024-08-21 DIAGNOSIS — F41.9 ANXIETY: ICD-10-CM

## 2024-08-21 DIAGNOSIS — G31.9 DEGENERATIVE DISEASE OF NERVOUS SYSTEM, UNSPECIFIED (HCC): ICD-10-CM

## 2024-08-21 DIAGNOSIS — F51.04 PSYCHOPHYSIOLOGIC INSOMNIA: ICD-10-CM

## 2024-08-21 DIAGNOSIS — R26.81 GAIT INSTABILITY: ICD-10-CM

## 2024-08-21 DIAGNOSIS — I25.10 CORONARY ARTERY DISEASE INVOLVING NATIVE CORONARY ARTERY OF NATIVE HEART WITHOUT ANGINA PECTORIS: ICD-10-CM

## 2024-08-21 DIAGNOSIS — Z00.00 MEDICARE ANNUAL WELLNESS VISIT, SUBSEQUENT: ICD-10-CM

## 2024-08-21 DIAGNOSIS — G31.84 MCI (MILD COGNITIVE IMPAIRMENT) WITH MEMORY LOSS: ICD-10-CM

## 2024-08-21 PROBLEM — F11.99 OPIOID USE, UNSPECIFIED WITH UNSPECIFIED OPIOID-INDUCED DISORDER (HCC): Status: RESOLVED | Noted: 2021-12-29 | Resolved: 2024-08-21

## 2024-08-21 PROCEDURE — 99214 OFFICE O/P EST MOD 30 MIN: CPT | Performed by: INTERNAL MEDICINE

## 2024-08-21 RX ORDER — MEMANTINE HYDROCHLORIDE 5 MG/1
TABLET ORAL
COMMUNITY
Start: 2024-08-16

## 2024-08-21 ASSESSMENT — LIFESTYLE VARIABLES
HOW MANY STANDARD DRINKS CONTAINING ALCOHOL DO YOU HAVE ON A TYPICAL DAY: PATIENT UNABLE TO ANSWER
HOW OFTEN DO YOU HAVE A DRINK CONTAINING ALCOHOL: PATIENT UNABLE TO ANSWER

## 2024-08-21 ASSESSMENT — PATIENT HEALTH QUESTIONNAIRE - PHQ9
6. FEELING BAD ABOUT YOURSELF - OR THAT YOU ARE A FAILURE OR HAVE LET YOURSELF OR YOUR FAMILY DOWN: NOT AT ALL
5. POOR APPETITE OR OVEREATING: NOT AT ALL
9. THOUGHTS THAT YOU WOULD BE BETTER OFF DEAD, OR OF HURTING YOURSELF: NOT AT ALL
4. FEELING TIRED OR HAVING LITTLE ENERGY: NOT AT ALL
2. FEELING DOWN, DEPRESSED OR HOPELESS: NOT AT ALL
SUM OF ALL RESPONSES TO PHQ QUESTIONS 1-9: 0
7. TROUBLE CONCENTRATING ON THINGS, SUCH AS READING THE NEWSPAPER OR WATCHING TELEVISION: NOT AT ALL
3. TROUBLE FALLING OR STAYING ASLEEP: NOT AT ALL
1. LITTLE INTEREST OR PLEASURE IN DOING THINGS: NOT AT ALL
SUM OF ALL RESPONSES TO PHQ QUESTIONS 1-9: 0
10. IF YOU CHECKED OFF ANY PROBLEMS, HOW DIFFICULT HAVE THESE PROBLEMS MADE IT FOR YOU TO DO YOUR WORK, TAKE CARE OF THINGS AT HOME, OR GET ALONG WITH OTHER PEOPLE: NOT DIFFICULT AT ALL
SUM OF ALL RESPONSES TO PHQ QUESTIONS 1-9: 0
SUM OF ALL RESPONSES TO PHQ9 QUESTIONS 1 & 2: 0
SUM OF ALL RESPONSES TO PHQ QUESTIONS 1-9: 0
8. MOVING OR SPEAKING SO SLOWLY THAT OTHER PEOPLE COULD HAVE NOTICED. OR THE OPPOSITE, BEING SO FIGETY OR RESTLESS THAT YOU HAVE BEEN MOVING AROUND A LOT MORE THAN USUAL: NOT AT ALL

## 2024-08-21 NOTE — PROGRESS NOTES
Chief Complaint   Patient presents with    Medicare AWV    Fall     \"Have you been to the ER, urgent care clinic since your last visit?  Hospitalized since your last visit?\"    YES - When: approximately 6  weeks ago.  Where and Why: [facility] - Fall 8.1.24.    “Have you seen or consulted any other health care providers outside of [facility] since your last visit?”    NO    Have you had a mammogram?”   NO    No breast cancer screening on file         “Have you had a colorectal cancer screening such as a colonoscopy/FIT/Cologuard?    NO    No colonoscopy on file  No cologuard on file  No FIT/FOBT on file   No flexible sigmoidoscopy on file         Click Here for Release of Records Request

## 2024-08-29 ASSESSMENT — ENCOUNTER SYMPTOMS
SHORTNESS OF BREATH: 0
NAUSEA: 1
ALLERGIC/IMMUNOLOGIC NEGATIVE: 1
RESPIRATORY NEGATIVE: 1
ABDOMINAL PAIN: 0
EYES NEGATIVE: 1

## 2024-08-29 NOTE — PROGRESS NOTES
Pain:  Continue current medications including Tylenol, Voltaren gel and lidocaine patch  Try to avoid narcotics and sedatives      Inactivity:  On average, how many days per week do you engage in moderate to strenuous exercise (like a brisk walk)?: 0 days (!) Abnormal  On average, how many minutes do you engage in exercise at this level?: 0 min    Interventions:  Advised patient to participate in assisted living social and physical activities     Abnormal BMI (underweight):  Body mass index is 15.31 kg/m². (!) Abnormal    Interventions:  See A/P for plan and any pertinent orders                       Objective   Vitals:    08/21/24 1311   BP: 100/62   Site: Left Upper Arm   Position: Sitting   Cuff Size: Medium Adult   Pulse: 96   Temp: 97.8 °F (36.6 °C)   SpO2: 92%   Weight: 41.7 kg (92 lb)   Height: 1.651 m (5' 5\")      Body mass index is 15.31 kg/m².                    Allergies   Allergen Reactions    Penicillins Swelling     Other reaction(s): Other  Reaction Type: Allergy; Reaction(s): Swelling or edema     Prior to Visit Medications    Medication Sig Taking? Authorizing Provider   memantine (NAMENDA) 5 MG tablet  Yes Crystal Gan MD   ACETAMINOPHEN CODEINE 300-30 MG/12.5 ML ORAL SOLN CMPD  Yes Crystal Gan MD   docusate sodium (COLACE) 100 MG capsule Take 1 capsule by mouth 2 times daily Yes Crystal Gan MD   Calcium Carb-Cholecalciferol (CALCIUM 500 + D3) 500-15 MG-MCG TABS Take 1 tablet by mouth in the morning and at bedtime Yes Crystal Gan MD   busPIRone (BUSPAR) 15 MG tablet TAKE 1 TABLET BY MOUTH THREE TIMES DAILY Yes Ernie Araujo DO   carbidopa-levodopa (SINEMET)  MG per tablet 1 tablet every 3 hours Yes Ernie Araujo DO   traZODone (DESYREL) 50 MG tablet Take 1 tablet by mouth nightly Yes Ernie Araujo DO   sodium chloride 1 g tablet Take 1 tablet by mouth 2 times daily Yes Char Fuller APRN - NP   metoprolol succinate (TOPROL XL) 25 MG extended  release tablet Take 1 tablet by mouth daily Yes Ernie Araujo DO   potassium chloride (KLOR-CON M) 20 MEQ extended release tablet Take 1 tablet by mouth 2 times daily Yes Ernie Araujo DO   ondansetron (ZOFRAN-ODT) 4 MG disintegrating tablet Take 1 tablet by mouth every 8 hours as needed for Nausea Yes Ernie Araujo DO   EQ ASPIRIN ADULT LOW DOSE 81 MG EC tablet Take 1 tablet by mouth daily Yes Provider, MD Crystal   sertraline (ZOLOFT) 100 MG tablet Take 1 tablet by mouth daily Yes Ernie Araujo DO   acetaminophen (TYLENOL) 500 MG tablet Take  by mouth every six (6) hours as needed for Pain. Yes Automatic Reconciliation, Ar   vitamin D (CHOLECALCIFEROL) 25 MCG (1000 UT) TABS tablet Take by mouth daily Yes Automatic Reconciliation, Ar   diclofenac sodium (VOLTAREN) 1 % GEL Apply topically 4 times daily Yes Automatic Reconciliation, Ar   Glucosamine Sulfate 1000 MG CAPS Take by mouth Yes Automatic Reconciliation, Ar   lidocaine (LIDODERM) 5 %  Yes Automatic Reconciliation, Ar   midodrine (PROAMATINE) 5 MG tablet Take by mouth 3 times daily (with meals) Yes Automatic Reconciliation, Ar       CareTeam (Including outside providers/suppliers regularly involved in providing care):   Patient Care Team:  Ernie Araujo DO as PCP - General  Ernie Araujo DO as PCP - Empaneled Provider  Madeline Carney RN as Post Acute Coordinator      Reviewed and updated this visit:  Allergies  Meds  Problems  Med Hx  Surg Hx  Soc Hx  Fam Hx

## 2024-08-29 NOTE — PROGRESS NOTES
Subjective    Frannie Law is a 74 y.o. female who presents today for the following:  Chief Complaint   Patient presents with    Medicare AWV    Fall       History of Present Illness  The patient presents for evaluation of multiple medical concerns. She is accompanied by her .    She experienced a fall on 08/01/2024, resulting in an orbital fracture. This incident occurred at her assisted living facility, [facility], where she has been residing since her discharge from the hospital.    She reports a significant weight loss, which she attributes to a decreased appetite and inability to finish her meals. Despite being provided with three meals a day and nutritional supplements like Boost, her food intake remains low.    She continues to take several medications, including memantine for memory issues. The nursing facility plans to discontinue her Seroquel, a decision supported by her neurologist. Her Plavix was previously discontinued due to frequent falls. She has a history of heart attack and stent placement. She also takes cholesterol medication and reports no current stomach issues.    She does not have back pain but experiences discomfort when trying to do a lot of activities. She is not walking very well. She can walk with a walker and does 3 rounds around the gym with it, but it is not always available. She cannot always get somebody to accompany her.    She reports no problems going to the bathroom. She has always slept better.    PMH/PSH/Allergies/Social History/medication list and most recent studies reviewed with patient.    Reports compliance with medications and diet. Trying to be active physically as tolerated with assistance. Reports no other new c/o.     Social History     Tobacco Use   Smoking Status Former    Current packs/day: 0.00    Average packs/day: 0.5 packs/day for 20.0 years (10.0 ttl pk-yrs)    Types: Cigarettes    Start date: 10/19/1991    Quit date: 10/19/2011    Years  since quittin.8   Smokeless Tobacco Never     Social History     Substance and Sexual Activity   Alcohol Use Not Currently         Past Medical History:   Diagnosis Date    CAD (coronary artery disease)     Hypertension     Sacrum and coccyx fracture (HCC) 2021       Allergies   Allergen Reactions    Penicillins Swelling     Other reaction(s): Other  Reaction Type: Allergy; Reaction(s): Swelling or edema        Current Outpatient Medications on File Prior to Visit   Medication Sig Dispense Refill    memantine (NAMENDA) 5 MG tablet       ACETAMINOPHEN CODEINE 300-30 MG/12.5 ML ORAL SOLN CMPD       docusate sodium (COLACE) 100 MG capsule Take 1 capsule by mouth 2 times daily      Calcium Carb-Cholecalciferol (CALCIUM 500 + D3) 500-15 MG-MCG TABS Take 1 tablet by mouth in the morning and at bedtime      busPIRone (BUSPAR) 15 MG tablet TAKE 1 TABLET BY MOUTH THREE TIMES DAILY 90 tablet 0    carbidopa-levodopa (SINEMET)  MG per tablet 1 tablet every 3 hours 270 tablet 1    traZODone (DESYREL) 50 MG tablet Take 1 tablet by mouth nightly 30 tablet 5    sodium chloride 1 g tablet Take 1 tablet by mouth 2 times daily 180 tablet 1    metoprolol succinate (TOPROL XL) 25 MG extended release tablet Take 1 tablet by mouth daily 90 tablet 1    potassium chloride (KLOR-CON M) 20 MEQ extended release tablet Take 1 tablet by mouth 2 times daily 180 tablet 1    ondansetron (ZOFRAN-ODT) 4 MG disintegrating tablet Take 1 tablet by mouth every 8 hours as needed for Nausea 30 tablet 5    EQ ASPIRIN ADULT LOW DOSE 81 MG EC tablet Take 1 tablet by mouth daily      sertraline (ZOLOFT) 100 MG tablet Take 1 tablet by mouth daily 90 tablet 1    acetaminophen (TYLENOL) 500 MG tablet Take  by mouth every six (6) hours as needed for Pain.      vitamin D (CHOLECALCIFEROL) 25 MCG (1000 UT) TABS tablet Take by mouth daily      diclofenac sodium (VOLTAREN) 1 % GEL Apply topically 4 times daily      Glucosamine Sulfate 1000 MG CAPS